# Patient Record
Sex: MALE | Race: WHITE | NOT HISPANIC OR LATINO | Employment: OTHER | URBAN - METROPOLITAN AREA
[De-identification: names, ages, dates, MRNs, and addresses within clinical notes are randomized per-mention and may not be internally consistent; named-entity substitution may affect disease eponyms.]

---

## 2017-01-06 DIAGNOSIS — E11.69 TYPE 2 DIABETES MELLITUS WITH OTHER SPECIFIED COMPLICATION, WITHOUT LONG-TERM CURRENT USE OF INSULIN (H): ICD-10-CM

## 2017-01-06 DIAGNOSIS — E11.69 TYPE 2 DIABETES MELLITUS WITH OTHER SPECIFIED COMPLICATION (H): ICD-10-CM

## 2017-01-06 LAB
ALBUMIN SERPL-MCNC: 4.1 G/DL (ref 3.4–5)
ALP SERPL-CCNC: 68 U/L (ref 40–150)
ALT SERPL W P-5'-P-CCNC: 46 U/L (ref 0–70)
ANION GAP SERPL CALCULATED.3IONS-SCNC: 7 MMOL/L (ref 3–14)
AST SERPL W P-5'-P-CCNC: 25 U/L (ref 0–45)
BILIRUB SERPL-MCNC: 1 MG/DL (ref 0.2–1.3)
BUN SERPL-MCNC: 18 MG/DL (ref 7–30)
CALCIUM SERPL-MCNC: 9.2 MG/DL (ref 8.5–10.1)
CHLORIDE SERPL-SCNC: 106 MMOL/L (ref 94–109)
CHOLEST SERPL-MCNC: 134 MG/DL
CO2 SERPL-SCNC: 28 MMOL/L (ref 20–32)
CREAT SERPL-MCNC: 0.7 MG/DL (ref 0.66–1.25)
CREAT UR-MCNC: 93 MG/DL
GFR SERPL CREATININE-BSD FRML MDRD: ABNORMAL ML/MIN/1.7M2
GLUCOSE SERPL-MCNC: 141 MG/DL (ref 70–99)
HBA1C MFR BLD: 7.3 % (ref 4.3–6)
HDLC SERPL-MCNC: 38 MG/DL
LDLC SERPL CALC-MCNC: 63 MG/DL
MICROALBUMIN UR-MCNC: 7 MG/L
MICROALBUMIN/CREAT UR: 7.58 MG/G CR (ref 0–17)
NONHDLC SERPL-MCNC: 96 MG/DL
POTASSIUM SERPL-SCNC: 4.9 MMOL/L (ref 3.4–5.3)
PROT SERPL-MCNC: 7.5 G/DL (ref 6.8–8.8)
SODIUM SERPL-SCNC: 141 MMOL/L (ref 133–144)
TRIGL SERPL-MCNC: 165 MG/DL

## 2017-01-06 PROCEDURE — 80061 LIPID PANEL: CPT | Performed by: FAMILY MEDICINE

## 2017-01-06 PROCEDURE — 83036 HEMOGLOBIN GLYCOSYLATED A1C: CPT | Performed by: FAMILY MEDICINE

## 2017-01-06 PROCEDURE — 99000 SPECIMEN HANDLING OFFICE-LAB: CPT | Performed by: FAMILY MEDICINE

## 2017-01-06 PROCEDURE — 36415 COLL VENOUS BLD VENIPUNCTURE: CPT | Performed by: FAMILY MEDICINE

## 2017-01-06 PROCEDURE — 82043 UR ALBUMIN QUANTITATIVE: CPT | Performed by: FAMILY MEDICINE

## 2017-01-06 PROCEDURE — 87522 HEPATITIS C REVRS TRNSCRPJ: CPT | Mod: 90 | Performed by: FAMILY MEDICINE

## 2017-01-06 PROCEDURE — 80053 COMPREHEN METABOLIC PANEL: CPT | Performed by: FAMILY MEDICINE

## 2017-01-09 LAB
HCV RNA SERPL NAA+PROBE-ACNC: NORMAL [IU]/ML
HCV RNA SERPL NAA+PROBE-LOG IU: NORMAL LOG IU/ML

## 2017-01-10 DIAGNOSIS — I10 HYPERTENSION, BENIGN ESSENTIAL, GOAL BELOW 140/90: Primary | ICD-10-CM

## 2017-01-10 NOTE — TELEPHONE ENCOUNTER
Lisinopril 10MG Tabs      Last Written Prescription Date: 10/13/2016  Last Fill Quantity: 90, # refills: 0  Last Office Visit with [facility], [facility] or [facility] prescribing provider: 12/27/2016       POTASSIUM   Date Value Ref Range Status   01/06/2017 4.9 3.4 - 5.3 mmol/L Final     CREATININE   Date Value Ref Range Status   01/06/2017 0.70 0.66 - 1.25 mg/dL Final     BP Readings from Last 3 Encounters:   12/27/16 109/70   09/19/16 90/60   11/18/15 130/84

## 2017-01-11 RX ORDER — LISINOPRIL 10 MG/1
10 TABLET ORAL DAILY
Qty: 30 TABLET | Refills: 0 | Status: SHIPPED | OUTPATIENT
Start: 2017-01-11 | End: 2017-01-11

## 2017-01-11 RX ORDER — LISINOPRIL 10 MG/1
10 TABLET ORAL DAILY
Qty: 90 TABLET | Refills: 0 | Status: SHIPPED | OUTPATIENT
Start: 2017-01-11 | End: 2017-04-11

## 2017-01-11 NOTE — TELEPHONE ENCOUNTER
Routing to provider Jared Lowe - please review and advise as appropriate  1. Patient was called and office visit appointment scheduled 1/20/2016  2. Per RN protocol can only provide 30 day refill for patient - however patient orders through mail order - financially beneficial for patient to receive 90 day supply  3. Are you will to provide 90 day supply at this time?    Thank you,  Que Silverman RN

## 2017-01-11 NOTE — PROGRESS NOTES
Quick Note:    Elvin Geller, your recent results are back and are at goal...good work! Please schedule an annual physical; see you then.   Nabeel  ______

## 2017-01-11 NOTE — TELEPHONE ENCOUNTER
Routing refill request to provider for review/approval because:  Patient needs to be seen because:  Due to annual HTN visit  Provider to approve 90 day supply - per patient has mail order pharmacy    Scheduled patient 1/20/2016 - HTN F/U    Thank you  Que Silverman RN

## 2017-02-21 ENCOUNTER — OFFICE VISIT (OUTPATIENT)
Dept: PSYCHOLOGY | Facility: CLINIC | Age: 62
End: 2017-02-21
Payer: COMMERCIAL

## 2017-02-21 DIAGNOSIS — F43.23 ADJUSTMENT DISORDER WITH MIXED ANXIETY AND DEPRESSED MOOD: Primary | ICD-10-CM

## 2017-02-21 PROCEDURE — 90847 FAMILY PSYTX W/PT 50 MIN: CPT | Performed by: MARRIAGE & FAMILY THERAPIST

## 2017-02-21 NOTE — MR AVS SNAPSHOT
MRN:[id number]                      After Visit Summary   2/21/2017    Yimi Mcqueen    MRN: [id number]           Visit Information        Provider Department      2/21/2017 9:30 AM Rhoda Cao [facility] Generic      MyChart Information     MyChart gives you secure access to your electronic health record. If you see a primary care provider, you can also send messages to your care team and make appointments. If you have questions, please call your primary care clinic.  If you do not have a primary care provider, please call [phone] and they will assist you.        Care EveryWhere ID     This is your Care EveryWhere ID. This could be used by other organizations to access your [facility] medical records  [id number]

## 2017-02-21 NOTE — PROGRESS NOTES
Progress Note    Client Name: Yimi Mcqueen  Date: 2/21/2017         Service Type: Family with client present      Session Start Time: 12pm  Session End Time: 12:45pm      Session Length: 45     Session #: 16     Attendees: Client and Spouse / Significant Other    Treatment Plan Last Reviewed: 2/21/2017  PHQ-9 / HENRI-7 : Each session     DATA      Progress Since Last Session (Related to Symptoms / Goals / Homework):   Symptoms: Stable    Homework: Partially completed      Episode of Care Goals: Minimal progress - ACTION (Actively working towards change); Intervened by reinforcing change plan / affirming steps taken     Current / Ongoing Stressors and Concerns:  Agustin was in the hospital for 1 week and still gaining back her strength. She is on meds that make her tired. Nabil has been in and out of hospital. Yimi has been very discouraged by the state of the [employer] with politics. Agustin says she isn't paying as much attention but both are troubled. Talked balance of session about Laisha who has now lost her job (and has since gotten another one) and stole from Agustin. They have given her June 1 as move out date. Couple cannot get on same page re: Laisha. Yimi gets angry and Agustin defends her. Talked about Mommy syndrome. Urged Agustin to work on validation so Yimi doesn't have to increase his anger and stress to convince her there is a serious problem. Yimi to also try validating Agustin more due to her genetic Mommy mode.      Treatment Objective(s) Addressed in This Session:   Identify ways to help his wife manage her stress.  Boundaries with family members  ID ways for Yimi to work on his stress     Intervention:   Solution Focused: encouraged couple to have expectations about the things that matter most: Moving out June 1, holding job, payment contract, family dinner once a week and keeping her room clean.    Relationship coaching around anger management  and communication.        ASSESSMENT: Current Emotional / Mental Status (status of significant symptoms):   Risk status (Self / Other harm or suicidal ideation)   Client denies current fears or concerns for personal safety.   Client denies current or recent suicidal ideation or behaviors.   Client denies current or recent homicidal ideation or behaviors.   Client denies current or recent self injurious behavior or ideation.   Client denies other safety concerns.   A safety and risk management plan has not been developed at this time, however client was given the after-hours number / 911 should there be a change in any of these risk factors.     Appearance:   Appropriate    Eye Contact:   Good    Psychomotor Behavior: Normal    Attitude:   Cooperative    Orientation:   All   Speech    Rate / Production: Normal     Volume:  Normal    Mood:    Normal   Affect:    Appropriate    Thought Content:  Clear    Thought Form:  Coherent  Logical    Insight:    Good      Medication Review:   No current psychiatric medications prescribed     Medication Compliance:   NA     Changes in Health Issues:   None reported     Chemical Use Review:   Substance Use: Chemical use reviewed, no active concerns identified      Tobacco Use: No current tobacco use.       Collateral Reports Completed:   Not Applicable    PLAN: (Client Tasks / Therapist Tasks / Other)  Manage tension with Laisha using clear expectations that she will continue working, move out June 1. Agustin to validate Yimi more often. Reduce defensiveness on both parts.    Rhoda Cao                                                         ________________________________________________________________________                                                   Treatment Plan    Client's Name: Yimi Mcqueen  YOB: 1955    Date: 2/21/2017    DSM-V Diagnoses: 309.28 - Adjustment Disorder with Mixed Anxiety and Depressed Mood By History; V71.09 - No  Diagnosis  Psychosocial / Contextual Factors: Parenting and wife with MS  WHODAS: 12    Referral / Collaboration:  Referral to another professional/service is not indicated at this time..    Anticipated number of session or this episode of care: 10      MeasurableTreatment Goal(s) related to diagnosis / functional impairment(s)  Goal 1: Client will lower HENRI 7 and PHQ 9 scores to 3 or below.    I will know I've met my goal when I'm less anxious and depressed due to our circumstances.      Objective #A (Client Action)    Client will learn healthy boundaries with adult children..  Status: Continued - Date(s):2/21/2017     Intervention(s)  Therapist will teach about healthy boundaries. with adult children.    Objective #B  Client will work to be on the same page with wife..  Status: Continued - Date(s):2/21/2017    Intervention(s)  Therapist will teach strategies to align goals..    Objective #C  Client will give daughter Laisha expectations with bottom lines..  Status: Restarted - Date: 2/21/2017     Intervention(s)  Therapist will help determine bottom line..        Client has reviewed and agreed to the above plan.      Rhoda Cao  February 21, 2017

## 2017-02-22 ASSESSMENT — PATIENT HEALTH QUESTIONNAIRE - PHQ9: SUM OF ALL RESPONSES TO PHQ QUESTIONS 1-9: 1

## 2017-04-11 DIAGNOSIS — I10 HYPERTENSION, BENIGN ESSENTIAL, GOAL BELOW 140/90: ICD-10-CM

## 2017-04-11 RX ORDER — LISINOPRIL 10 MG/1
TABLET ORAL
Qty: 90 TABLET | Refills: 2 | Status: SHIPPED | OUTPATIENT
Start: 2017-04-11 | End: 2018-01-02

## 2017-04-11 NOTE — TELEPHONE ENCOUNTER
Prescription approved per [facility] [place] Refill Protocol.    Pamella Cavazos RN

## 2017-04-11 NOTE — TELEPHONE ENCOUNTER
LISINOPRIL 10MG TABS        Last Written Prescription Date: 1/11/17  Last Fill Quantity: 90, # refills: 0  Last Office Visit with [facility] or [facility] prescribing provider: 12/27/16  Next 5 appointments (look out 90 days)     Apr 12, 2017  1:00 PM CDT   Return Visit with Rhoda Neumann [facility] ([facility])    [address] [facility] [address]  [facility] [address]   [phone]                   Potassium   Date Value Ref Range Status   01/06/2017 4.9 3.4 - 5.3 mmol/L Final     Creatinine   Date Value Ref Range Status   01/06/2017 0.70 0.66 - 1.25 mg/dL Final     BP Readings from Last 3 Encounters:   12/27/16 109/70   09/19/16 90/60   11/18/15 130/84

## 2017-04-12 ENCOUNTER — OFFICE VISIT (OUTPATIENT)
Dept: PSYCHOLOGY | Facility: CLINIC | Age: 62
End: 2017-04-12
Payer: COMMERCIAL

## 2017-04-12 DIAGNOSIS — F41.1 GAD (GENERALIZED ANXIETY DISORDER): Primary | ICD-10-CM

## 2017-04-12 PROCEDURE — 90847 FAMILY PSYTX W/PT 50 MIN: CPT | Performed by: MARRIAGE & FAMILY THERAPIST

## 2017-04-12 NOTE — PROGRESS NOTES
Progress Note    Client Name: Yimi Mcqueen  Date: 4/12/2017         Service Type: Family with client present      Session Start Time: 12pm  Session End Time: 12:45pm      Session Length: 45     Session #: 17     Attendees: Client and Spouse / Significant Other    Treatment Plan Last Reviewed: 2/21/2017  PHQ-9 / HENRI-7 : Each session     DATA      Progress Since Last Session (Related to Symptoms / Goals / Homework):   Symptoms: Stable    Homework: Partially completed      Episode of Care Goals: Minimal progress - ACTION (Actively working towards change); Intervened by reinforcing change plan / affirming steps taken     Current / Ongoing Stressors and Concerns:  Agustin ended up in a coma in a [place] hospital and is still recovering from her seizures and health complications. She had trauma from these events as well as the dreams she had while in a coma. Since coming home, they see that Laisha has violated their contract for living with them. They have given her 7 days to move out. Both are on the same page but Yimi thinks Agustin is going to be manipulated by Laisha and will back down. He blames her for the situation Laisha is in. Processed this and encouraged him to not be so black and white in his opinion. He back downed some. He struggles with significant unresolved anger and hurt from past and current treatment by Laisha.      Treatment Objective(s) Addressed in This Session:   Identify ways to help his wife manage her stress.  Boundaries with family members  ID ways for Yimi to work on his stress     Intervention:   Solution Focused: encouraged couple to have expectations about the things that matter most:    Relationship coaching around anger management and communication.        ASSESSMENT: Current Emotional / Mental Status (status of significant symptoms):   Risk status (Self / Other harm or suicidal ideation)   Client denies current fears or concerns for  personal safety.   Client denies current or recent suicidal ideation or behaviors.   Client denies current or recent homicidal ideation or behaviors.   Client denies current or recent self injurious behavior or ideation.   Client denies other safety concerns.   A safety and risk management plan has not been developed at this time, however client was given the after-hours number / 911 should there be a change in any of these risk factors.     Appearance:   Appropriate    Eye Contact:   Good    Psychomotor Behavior: Normal    Attitude:   Cooperative    Orientation:   All   Speech    Rate / Production: Normal     Volume:  Normal    Mood:    Angry  Elevated  Irritable    Affect:    Appropriate    Thought Content:  Clear    Thought Form:  Coherent  Logical    Insight:    Good      Medication Review:   No current psychiatric medications prescribed     Medication Compliance:   NA     Changes in Health Issues:   None reported     Chemical Use Review:   Substance Use: Chemical use reviewed, no active concerns identified      Tobacco Use: No current tobacco use.       Collateral Reports Completed:   Not Applicable    PLAN: (Client Tasks / Therapist Tasks / Other)  Stay on the same page that Laisha must move out next week. Resist caving when she tries to manipulate. Yimi to have compassion for Agustin who has been and still is in the middle of Yimi and Laisha.     Rhoda Cao                                                         ________________________________________________________________________                                                   Treatment Plan    Client's Name: Yimi Mcqueen  YOB: 1955    Date: 2/21/2017    DSM-V Diagnoses: 309.28 - Adjustment Disorder with Mixed Anxiety and Depressed Mood By History; V71.09 - No Diagnosis  Psychosocial / Contextual Factors: Parenting and wife with MS  WHODAS: 12    Referral / Collaboration:  Referral to another professional/service is  not indicated at this time..    Anticipated number of session or this episode of care: 10      MeasurableTreatment Goal(s) related to diagnosis / functional impairment(s)  Goal 1: Client will lower HENRI 7 and PHQ 9 scores to 3 or below.    I will know I've met my goal when I'm less anxious and depressed due to our circumstances.      Objective #A (Client Action)    Client will learn healthy boundaries with adult children..  Status: Continued - Date(s):2/21/2017     Intervention(s)  Therapist will teach about healthy boundaries. with adult children.    Objective #B  Client will work to be on the same page with wife..  Status: Continued - Date(s):2/21/2017    Intervention(s)  Therapist will teach strategies to align goals..    Objective #C  Client will give daughter Laisha expectations with bottom lines..  Status: Restarted - Date: 2/21/2017     Intervention(s)  Therapist will help determine bottom line..        Client has reviewed and agreed to the above plan.      Rhoda Cao  February 21, 2017

## 2017-04-12 NOTE — MR AVS SNAPSHOT
MRN:[id number]                      After Visit Summary   4/12/2017    Yimi Mcqueen    MRN: [id number]           Visit Information        Provider Department      4/12/2017 1:00 PM Rhoda Cao [facility] Generic      Your next 10 appointments already scheduled     Apr 27, 2017 12:30 PM CDT   Return Visit with Rhoda Thien Mindihowie   [facility] ([facility])    [address]  [facility] [address]   [phone]              MyChart Information     [employer] gives you secure access to your electronic health record. If you see a primary care provider, you can also send messages to your care team and make appointments. If you have questions, please call your primary care clinic.  If you do not have a primary care provider, please call [phone] and they will assist you.        Care EveryWhere ID     This is your Care EveryWhere ID. This could be used by other organizations to access your [facility] medical records  [id number]

## 2017-04-13 ASSESSMENT — PATIENT HEALTH QUESTIONNAIRE - PHQ9: SUM OF ALL RESPONSES TO PHQ QUESTIONS 1-9: 3

## 2017-04-24 ENCOUNTER — ALLIED HEALTH/NURSE VISIT (OUTPATIENT)
Dept: PHARMACY | Facility: CLINIC | Age: 62
End: 2017-04-24
Payer: COMMERCIAL

## 2017-04-24 DIAGNOSIS — E78.5 HYPERLIPIDEMIA WITH TARGET LDL LESS THAN 100: ICD-10-CM

## 2017-04-24 DIAGNOSIS — E11.69 TYPE 2 DIABETES MELLITUS WITH OTHER SPECIFIED COMPLICATION (H): Primary | ICD-10-CM

## 2017-04-24 DIAGNOSIS — I10 HYPERTENSION, BENIGN ESSENTIAL, GOAL BELOW 140/90: ICD-10-CM

## 2017-04-24 DIAGNOSIS — I24.9 ACS (ACUTE CORONARY SYNDROME) (H): ICD-10-CM

## 2017-04-24 PROCEDURE — 99607 MTMS BY PHARM ADDL 15 MIN: CPT | Mod: U4 | Performed by: PHARMACIST

## 2017-04-24 PROCEDURE — 99605 MTMS BY PHARM NP 15 MIN: CPT | Mod: U4 | Performed by: PHARMACIST

## 2017-04-24 RX ORDER — NITROGLYCERIN 0.4 MG/1
0.4 TABLET SUBLINGUAL EVERY 5 MIN PRN
COMMUNITY
End: 2018-06-27

## 2017-04-24 NOTE — MR AVS SNAPSHOT
After Visit Summary   4/24/2017    Yimi Mcqueen    MRN: [id number]           Patient Information     Date Of Birth          1955        Visit Information        Provider Department      4/24/2017 12:30 PM [facility]        Today's Diagnoses     Type 2 diabetes mellitus with other specified complication (H)    -  1    Hyperlipidemia with target LDL less than 100        Hypertension, benign essential, goal below 140/90        ACS (acute coronary syndrome) (H)          Care Instructions    Dear Yimi,     It was a pleasure talking with you today regarding your medications and medical concerns. The following is a summary of what we discussed.     1. Please stay on all your current medications as is .  2. Keep working on diet and exercise changes , lose a few pounds if you can , have A1c lab recheck this summer.   3. Please call [facility] -let them know that your enrolled in their MTM (medication therapy management) program and have them explain what rx copay savings your receiving for being in this program .     Follow-up per phone on Monday October 23rd at 1pm.     Thank you for your time and please feel free to call ([phone] voicemail/pager or [phone] clinic main line) or e-mail ([email or web address]) with any questions.     Anand Jenkins Rph.  Medication Therapy Management Provider  [phone]            Follow-ups after your visit        Your next 10 appointments already scheduled     Apr 27, 2017 12:30 PM CDT   Return Visit with Rhoda Bush ([facility])    [address]  [address]  [facility] [address]   [phone]            Oct 23, 2017  1:00 PM CDT   TELEMEDICINE with Anand Jenkins RPH   [facility] ([facility])    [address]  [facility] [address] [phone]           Note: this is not an onsite visit; there is no need to  come to the facility.              Who to contact     If you have questions or need follow up information about today's clinic visit or your schedule please contact [facility] directly at [phone].  Normal or non-critical lab and imaging results will be communicated to you by MyChart, letter or phone within 4 business days after the clinic has received the results. If you do not hear from us within 7 days, please contact the clinic through MyChart or phone. If you have a critical or abnormal lab result, we will notify you by phone as soon as possible.  Submit refill requests through [employer] or call your pharmacy and they will forward the refill request to us. Please allow 3 business days for your refill to be completed.          Additional Information About Your Visit        Rewarding Returnhart Information     [employer] gives you secure access to your electronic health record. If you see a primary care provider, you can also send messages to your care team and make appointments. If you have questions, please call your primary care clinic.  If you do not have a primary care provider, please call [phone] and they will assist you.        Care EveryWhere ID     This is your Care EveryWhere ID. This could be used by other organizations to access your [facility] medical records  [id number]         Blood Pressure from Last 3 Encounters:   12/27/16 109/70   09/19/16 90/60   11/18/15 130/84    Weight from Last 3 Encounters:   12/27/16 257 lb (116.6 kg)   09/19/16 253 lb 1.6 oz (114.8 kg)   09/09/16 252 lb 9.6 oz (114.6 kg)              Today, you had the following     No orders found for display       Primary Care Provider Office Phone # Fax #    Nabeel Lowe [phone]       [facility] [address] [facility] [address]  [facility] [address]        Thank you!     Thank you for choosing [facility]  for your care. Our goal is always to provide you with  excellent care. Hearing back from our patients is one way we can continue to improve our services. Please take a few minutes to complete the written survey that you may receive in the mail after your visit with us. Thank you!             Your Updated Medication List - Protect others around you: Learn how to safely use, store and throw away your medicines at www.disposemymeds.org.          This list is accurate as of: 4/24/17  4:56 PM.  Always use your most recent med list.                   Brand Name Dispense Instructions for use    aspirin 81 MG tablet      Take  by mouth daily.       atorvastatin 80 MG tablet    LIPITOR     Take 80 mg by mouth daily       blood glucose monitoring test strip    ACCU-CHEK TIERNEY    100 strip    Use to test blood sugars 2 times daily or as directed.       BRILINTA 90 MG tablet   Generic drug:  ticagrelor      Take 90 mg by mouth 2 times daily       calcium-vitamin D 600-400 MG-UNIT per tablet    CALTRATE     Take 1 tablet by mouth daily       lisinopril 10 MG tablet    PRINIVIL/ZESTRIL    90 tablet    TAKE ONE TABLET BY MOUTH EVERY DAY       metFORMIN 500 MG 24 hr tablet    GLUCOPHAGE-XR     Take 500 mg by mouth 3 times daily (with meals)       metoprolol 50 MG 24 hr tablet    TOPROL-XL     Take 50 mg by mouth daily       MULTIVITAMINS PO      Take  by mouth daily.       NITROSTAT 0.4 MG sublingual tablet   Generic drug:  nitroglycerin      Place 0.4 mg under the tongue every 5 minutes as needed for chest pain For chest pain place 1 tablet under the tongue every 5 minutes for 3 doses. If symptoms persist 5 minutes after 1st dose call 911.       sildenafil 50 MG cap/tab    VIAGRA    6 tablet    Take 1 tablet (50 mg) by mouth daily as needed for erectile dysfunction

## 2017-04-24 NOTE — PATIENT INSTRUCTIONS
Dear Yimi,     It was a pleasure talking with you today regarding your medications and medical concerns. The following is a summary of what we discussed.     1. Please stay on all your current medications as is .  2. Keep working on diet and exercise changes , lose a few pounds if you can , have A1c lab recheck this summer.   3. Please call [facility] -let them know that your enrolled in their MTM (medication therapy management) program and have them explain what rx copay savings your receiving for being in this program .     Follow-up per phone on Monday October 23rd at 1pm.     Thank you for your time and please feel free to call ([phone] voicemail/pager or [phone] clinic main line) or e-mail ([email or web address]) with any questions.     Anand Jenkins Rph.  Medication Therapy Management Provider  [phone]

## 2017-04-24 NOTE — PROGRESS NOTES
SUBJECTIVE/OBJECTIVE:                                                    Yimi Mcqueen is a 61 year old male called for an initial visit for Medication Therapy Management.  He was referred to me from [facility]-MT[facility] program. Heart, diabetes and asthma meds have reduced copays on this MTM-[facility] program per patient.     Chief Complaint: He wants general med review.       Personal Healthcare Goals: lose another 10 lbs -20lbs.  Plan - cards wants him on meditarranean diet   He's a gourmet cook, organic garden , frustrated with nutrtion classes --waste of time --what other options do we have for him ?  He's retired now as well.     Allergies/ADRs: Reviewed in Epic  Tobacco: History of tobacco dependence - quit 16 years ago.  Alcohol: Less than 1 beverage / month  Caffeine: 1 cups/day of coffee  Activity: bike -100 miles /week x 3-4 years.   PMH: Reviewed in Epic; DM x 2 years now;  Recent MI sept. 2016, 2 stents 10-10-16    Medication Adherence: issues found and discussed below and sets up own med boxes    Type -2 DM: metformin- er 500mg tid now  - checking bs's -- prn - 110-135 fasting in am .  115-120 after a bike ride.   Last a1c is 7.3% --he 'd like to lose weight and work on a different plan yet he eats non processed foods and shops outer aisles at [employer] and grows organic veggies at home.  He has changed diet now last 6 months more healthy -now weighs at home 249lbs.     His cards md wants him to try meditearrean diet --he is on a veggies/ fruits diet , avoiding more red meat -just monthly now. Chicken qod, fish weekly .     He does see endo md Dr. Blank -patient states he was told a1c ok does not need any glp-1 med.       ACS:  Recent MI (2016)-- 2 coronary artery drug eleuting stents -80mg atorva + brilinta 90mg bid , also metoprolol er 50mg qday and sl-prn nitro --carries with him on his key chain.        Hyperlipidemia: Current therapy includes Atorvastatin 80mg. once daily and exercise.  Pt reports no  significant myalgias or other side effects.         Hypertension: Current medications include Lisinopril 10mg qam and metoprolol 50mg er tab hs .  Patient does self-monitor BP. Home BP monitoring in range of 120's systolic over 70's diastolic.pulse range 65-68.  Patient reports no current medication side effects.          Current labs include:  BP Readings from Last 3 Encounters:   12/27/16 109/70   09/19/16 90/60   11/18/15 130/84     Lab Results   Component Value Date    A1C 7.3 01/06/2017    A1C 7.1 09/19/2016    A1C 7.3 09/25/2015    A1C 6.5 09/26/2014    A1C 8.4 04/10/2014       Cholesterol   Date Value Ref Range Status   01/06/2017 134 <200 mg/dL Final   09/25/2015 237 (H) <200 mg/dL Final     Comment:     LDL Cholesterol is the primary guide to therapy.   The NCEP recommends further evaluation of: patients with cholesterol greater   than 200 mg/dL if additional risk factors are present, cholesterol greater   than   240 mg/dL, triglycerides greater than 150 mg/dL, or HDL less than 40 mg/dL.       HDL Cholesterol   Date Value Ref Range Status   01/06/2017 38 (L) >39 mg/dL Final   09/25/2015 39 (L) >40 mg/dL Final     LDL Cholesterol Calculated   Date Value Ref Range Status   01/06/2017 63 <100 mg/dL Final     Comment:     Desirable:       <100 mg/dl   09/25/2015 145 (H) 0 - 129 mg/dL Final     Comment:     LDL Cholesterol is the primary guide to therapy: LDL-cholesterol goal in high   risk patients is <100 mg/dL and in very high risk patients is <70 mg/dL.       Triglycerides   Date Value Ref Range Status   01/06/2017 165 (H) <150 mg/dL Final     Comment:     Borderline high:  150-199 mg/dl   High:             200-499 mg/dl   Very high:       >499 mg/dl   Fasting specimen     09/25/2015 265 (H) 0 - 150 mg/dL Final     Comment:     Fasting specimen     Cholesterol/HDL Ratio   Date Value Ref Range Status   09/25/2015 6.1 (H) 0.0 - 5.0 Final   09/26/2014 5.9 (H) 0.0 - 5.0 Final         Liver Function Studies -    Recent Labs   Lab Test  09/25/15   1118   PROTTOTAL  7.5   ALBUMIN  4.4   BILITOTAL  0.8   ALKPHOS  58   AST  25   ALT  40       MICROL       69   9/25/2015  MICROALBUMIN    38.93   9/25/2015    Last Basic Metabolic Panel:  Lab Results   Component Value Date     01/06/2017      Lab Results   Component Value Date    POTASSIUM 4.9 01/06/2017     Lab Results   Component Value Date    CHLORIDE 106 01/06/2017     Lab Results   Component Value Date    ALBARO 9.2 01/06/2017     Lab Results   Component Value Date    CO2 28 01/06/2017     Lab Results   Component Value Date    BUN 18 01/06/2017     Lab Results   Component Value Date    CR 0.70 01/06/2017     Lab Results   Component Value Date     01/06/2017       GFR Estimate   Date Value Ref Range Status   01/06/2017 >90  Non  GFR Calc   >60 mL/min/1.7m2 Final   09/25/2015 85 >60 mL/min/1.7m2 Final     Comment:     Non  GFR Calc   06/24/2015 88 >60 mL/min/1.7m2 Final     Comment:     Non  GFR Calc     GFR Estimate If Black   Date Value Ref Range Status   01/06/2017 >90   GFR Calc   >60 mL/min/1.7m2 Final   09/25/2015 >90   GFR Calc   >60 mL/min/1.7m2 Final   06/24/2015 >90   GFR Calc   >60 mL/min/1.7m2 Final     TSH   Date Value Ref Range Status   09/19/2016 0.50 0.40 - 4.00 mU/L Final   ]      Most Recent Immunizations   Administered Date(s) Administered     Influenza Vaccine IM 3yrs+ 4 Valent IIV4 09/19/2016     Pneumococcal (PCV 13) 09/25/2015     Pneumococcal 23 valent 09/19/2016     TDAP Vaccine (Adacel) 04/10/2014     ASSESSMENT:                                                       Current medications were reviewed with him today.     Medication Adherence: no  Issues now.       Diabetes: Needs Improvement. Patient is not meeting A1c goal of < 7%. Self monitoring of blood glucose is not at goal of fasting  mg/dL and post prandial < 150 mg/dL. Pt would  benefit from minimum SMBG: Check blood sugars fasting, and occasionally 2 hours after starting a meal.  Metformin :  stay on the same dose.  GLP-1 agonist (victoza/trulicity) :  Consider glp--1 med in future if a1c worsens?  Dr. Lowe previously ok'd mtm to add this drug in if patient wan ts it --he declines today but may in future?  Increased exercise--stay active .   Weight loss recommended--keep improving thru low carb diet , daily exercise and weight loss.       ACS: stable     Hyperlipidemia: stable. Pt is on high intensity statin which is indicated based on 2013 ACC/AHA guidelines for lipid management.      Hypertension: Stable. Patient is meeting BP goal of < 140/90mmHg.        PLAN:                                                      1. Please stay on all your current medications as is .  2. Keep working on diet and exercise changes , lose a few pounds if you can , have A1c lab recheck this summer.   3. Please call [facility] -let them know that your enrolled in their MTM (medication therapy management) program and have them explain what rx copay savings your receiving for being in this program .     Follow-up per phone on Monday October 23rd at 1pm.     I spent 40 minutes with this patient today.  All changes were made via collaborative practice agreement with Nabeel Lowe. A copy of the visit note was provided to the patient's primary care provider.        The patient was sent via [employer] a summary of these recommendations as an after visit summary.     Anand Jenkins Rph.  Medication Therapy Management Provider  [phone]

## 2017-04-24 NOTE — Clinical Note
Dr. Lowe--jovan-- I had a nice mtm phone visit with rochelle today --he declined to add a glp-1 drug to improved his a1c and reduce his weight -he wants 6 months to continue new diet then recheck a1c and go from their .  Thank you , Anand Jenkins, [facility]. Medication Therapy Management Provider [phone]

## 2017-04-27 ENCOUNTER — OFFICE VISIT (OUTPATIENT)
Dept: PSYCHOLOGY | Facility: CLINIC | Age: 62
End: 2017-04-27
Payer: COMMERCIAL

## 2017-04-27 DIAGNOSIS — F43.23 ADJUSTMENT DISORDER WITH MIXED ANXIETY AND DEPRESSED MOOD: Primary | ICD-10-CM

## 2017-04-27 PROCEDURE — 90847 FAMILY PSYTX W/PT 50 MIN: CPT | Performed by: MARRIAGE & FAMILY THERAPIST

## 2017-04-27 NOTE — MR AVS SNAPSHOT
MRN:[id number]                      After Visit Summary   4/27/2017    Yimi Mcqueen    MRN: [id number]           Visit Information        Provider Department      4/27/2017 12:30 PM Rhoda Cao [facility] Generic      Your next 10 appointments already scheduled     May 11, 2017  9:30 AM CDT   Return Visit with Rhoda Thien [place]   [facility] ([facility])    [address]  [facility] [address]   [phone]            Oct 23, 2017  1:00 PM CDT   TELEMEDICINE with Anand Jenkins B[place]   [facility] ([facility])    [address] [facility] [address] [phone]           Note: this is not an onsite visit; there is no need to come to the facility.              [employer] Information     [facility] gives you secure access to your electronic health record. If you see a primary care provider, you can also send messages to your care team and make appointments. If you have questions, please call your primary care clinic.  If you do not have a primary care provider, please call [phone] and they will assist you.        Care EveryWhere ID     This is your Care EveryWhere ID. This could be used by other organizations to access your [place] medical records  [id number]

## 2017-04-27 NOTE — PROGRESS NOTES
Progress Note    Client Name: Yimi Mcqueen  Date: 4/27/2017         Service Type: Family with client present      Session Start Time: 12pm  Session End Time: 12:45pm      Session Length: 45     Session #: 18     Attendees: Client and Spouse / Significant Other    Treatment Plan Last Reviewed: 2/21/2017  PHQ-9 / HENRI-7 : Each session     DATA      Progress Since Last Session (Related to Symptoms / Goals / Homework):   Symptoms: Stable    Homework: Partially completed      Episode of Care Goals: Satisfactory progress - ACTION (Actively working towards change); Intervened by reinforcing change plan / affirming steps taken     Current / Ongoing Stressors and Concerns:  Couple were able to move their daughter Laisha out of the house. Agustin is missing her somewhat but enjoying the peace and quiet. Yimi is very happy.      Treatment Objective(s) Addressed in This Session:   Identify ways to help his wife manage her stress.  Boundaries with family members  ID ways for Yimi to work on his stress  Cautioned couple to not triangulate with the girls.     Intervention:   Solution Focused: encouraged couple to have expectations about the things that matter most:    Relationship coaching around anger management and communication.        ASSESSMENT: Current Emotional / Mental Status (status of significant symptoms):   Risk status (Self / Other harm or suicidal ideation)   Client denies current fears or concerns for personal safety.   Client denies current or recent suicidal ideation or behaviors.   Client denies current or recent homicidal ideation or behaviors.   Client denies current or recent self injurious behavior or ideation.   Client denies other safety concerns.   A safety and risk management plan has not been developed at this time, however client was given the after-hours number / 911 should there be a change in any of these risk  factors.     Appearance:   Appropriate    Eye Contact:   Good    Psychomotor Behavior: Normal    Attitude:   Cooperative    Orientation:   All   Speech    Rate / Production: Normal     Volume:  Normal    Mood:    Angry  Elevated  Irritable    Affect:    Appropriate    Thought Content:  Clear    Thought Form:  Coherent  Logical    Insight:    Good      Medication Review:   No current psychiatric medications prescribed     Medication Compliance:   NA     Changes in Health Issues:   None reported     Chemical Use Review:   Substance Use: Chemical use reviewed, no active concerns identified      Tobacco Use: No current tobacco use.       Collateral Reports Completed:   Not Applicable    PLAN: (Client Tasks / Therapist Tasks / Other)  Stay on course with healthy boundaries with both girls. Do not triangulate now with Nabil in need. Focus on health and wellness.        Rhoda Neumann [facility]                                                         ________________________________________________________________________                                                   Treatment Plan    Client's Name: Yimi Mcqueen  YOB: 1955    Date: 2/21/2017    DSM-V Diagnoses: 309.28 - Adjustment Disorder with Mixed Anxiety and Depressed Mood By History; V71.09 - No Diagnosis  Psychosocial / Contextual Factors: Parenting and wife with MS  WHODAS: 12    Referral / Collaboration:  Referral to another professional/service is not indicated at this time..    Anticipated number of session or this episode of care: 10      MeasurableTreatment Goal(s) related to diagnosis / functional impairment(s)  Goal 1: Client will lower HENRI 7 and PHQ 9 scores to 3 or below.    I will know I've met my goal when I'm less anxious and depressed due to our circumstances.      Objective #A (Client Action)    Client will learn healthy boundaries with adult children..  Status: Continued - Date(s):2/21/2017     Intervention(s)  Therapist will teach  about healthy boundaries. with adult children.    Objective #B  Client will work to be on the same page with wife..  Status: Continued - Date(s):2/21/2017    Intervention(s)  Therapist will teach strategies to align goals..    Objective #C  Client will give daughter Laisha expectations with bottom lines..  Status: Restarted - Date: 2/21/2017     Intervention(s)  Therapist will help determine bottom line..        Client has reviewed and agreed to the above plan.      Rhoda Cao  February 21, 2017

## 2017-04-28 ASSESSMENT — PATIENT HEALTH QUESTIONNAIRE - PHQ9: SUM OF ALL RESPONSES TO PHQ QUESTIONS 1-9: 0

## 2017-05-11 ENCOUNTER — OFFICE VISIT (OUTPATIENT)
Dept: PSYCHOLOGY | Facility: CLINIC | Age: 62
End: 2017-05-11
Payer: COMMERCIAL

## 2017-05-11 DIAGNOSIS — F43.23 ADJUSTMENT DISORDER WITH MIXED ANXIETY AND DEPRESSED MOOD: Primary | ICD-10-CM

## 2017-05-11 PROCEDURE — 90847 FAMILY PSYTX W/PT 50 MIN: CPT | Performed by: MARRIAGE & FAMILY THERAPIST

## 2017-05-11 NOTE — MR AVS SNAPSHOT
MRN:[id number]                      After Visit Summary   5/11/2017    Yimi Mcqueen    MRN: [id number]           Visit Information        Provider Department      5/11/2017 9:30 AM Rhoda Cao [facility] [place] [facility] Generic      Your next 10 appointments already scheduled     Jun 08, 2017  9:00 AM CDT   Return Visit with Rhoda Cao   [facility] [place] ([facility] [place])    [address]  [facility] [address]   [phone]            Oct 23, 2017  1:00 PM CDT   TELEMEDICINE with Anand Jenkins [facility] ([facility])    [address] [address] [phone]           Note: this is not an onsite visit; there is no need to come to the facility.              MyChart Information     [facility] gives you secure access to your electronic health record. If you see a primary care provider, you can also send messages to your care team and make appointments. If you have questions, please call your primary care clinic.  If you do not have a primary care provider, please call [phone] and they will assist you.        Care EveryWhere ID     This is your Care EveryWhere ID. This could be used by other organizations to access your [place] medical records  [id number]

## 2017-05-11 NOTE — PROGRESS NOTES
Progress Note    Client Name: Yimi Mcqueen  Date: 5/11/2017         Service Type: Family with client present      Session Start Time: 9:30am  Session End Time: 10:15am      Session Length: 45     Session #: 20     Attendees: Client and Spouse / Significant Other    Treatment Plan Last Reviewed: 5/11/2017  PHQ-9 / HENRI-7 : Each session     DATA      Progress Since Last Session (Related to Symptoms / Goals / Homework):   Symptoms: Stable    Homework: Partially completed      Episode of Care Goals: Satisfactory progress - ACTION (Actively working towards change); Intervened by reinforcing change plan / affirming steps taken     Current / Ongoing Stressors and Concerns:  Couple are still adjusting to having Laisha out of the house. Agustin is worrying about her. trying to move forward. Wanting to set boundaries with cell phone. Set expectations.     Treatment Objective(s) Addressed in This Session:   Identify ways to help his wife manage her stress.  Boundaries with family members       Intervention:   Solution Focused: encouraged couple to have expectations about the things that matter most:    Relationship coaching around anger management and communication.        ASSESSMENT: Current Emotional / Mental Status (status of significant symptoms):   Risk status (Self / Other harm or suicidal ideation)   Client denies current fears or concerns for personal safety.   Client denies current or recent suicidal ideation or behaviors.   Client denies current or recent homicidal ideation or behaviors.   Client denies current or recent self injurious behavior or ideation.   Client denies other safety concerns.   A safety and risk management plan has not been developed at this time, however client was given the after-hours number / 911 should there be a change in any of these risk factors.     Appearance:   Appropriate    Eye Contact:   Good    Psychomotor Behavior: Normal     Attitude:   Cooperative    Orientation:   All   Speech    Rate / Production: Normal     Volume:  Normal    Mood:    Normal   Affect:    Appropriate    Thought Content:  Clear    Thought Form:  Coherent  Logical    Insight:    Good      Medication Review:   No current psychiatric medications prescribed     Medication Compliance:   NA     Changes in Health Issues:   None reported     Chemical Use Review:   Substance Use: Chemical use reviewed, no active concerns identified      Tobacco Use: No current tobacco use.       Collateral Reports Completed:   Not Applicable    PLAN: (Client Tasks / Therapist Tasks / Other)  Stay on course with healthy boundaries with both girls. Expectations regarding cell phone. Do not triangulate now with Nabil in need. Focus on health and wellness.        Rhodamarleen Neumann [facility]                                                         ________________________________________________________________________                                                   Treatment Plan    Client's Name: Yimi Mcqueen  YOB: 1955    Date: 5/11/2017    DSM-V Diagnoses: 309.28 - Adjustment Disorder with Mixed Anxiety and Depressed Mood By History; V71.09 - No Diagnosis  Psychosocial / Contextual Factors: Parenting with wife their adult daughter with addiction and felony  WHODAS: 12    Referral / Collaboration:  Referral to another professional/service is not indicated at this time..    Anticipated number of session or this episode of care: 10      MeasurableTreatment Goal(s) related to diagnosis / functional impairment(s)  Goal 1: Client will lower HENRI 7 and PHQ 9 scores to 3 or below.    I will know I've met my goal when I'm less anxious and depressed due to our circumstances.      Objective #A (Client Action)    Client will learn healthy boundaries with adult children..  Status: Continued - Date(s): 5/11/2017     Intervention(s)  Therapist will teach about healthy boundaries. with adult  children.    Objective #B  Client will work to be on the same page with wife..  Status: Continued - Date(s): 5/11/2017    Intervention(s)  Therapist will teach strategies to align goals..    Objective #C  Client will give daughter Laisha expectations with bottom lines..  Status: Completed - Date: 5/11/2017     Intervention(s)  Therapist will help determine bottom line..        Client has reviewed and agreed to the above plan.      Rhoda Cao  May 11, 2017

## 2017-05-12 ASSESSMENT — PATIENT HEALTH QUESTIONNAIRE - PHQ9: SUM OF ALL RESPONSES TO PHQ QUESTIONS 1-9: 1

## 2017-06-15 ENCOUNTER — OFFICE VISIT (OUTPATIENT)
Dept: PSYCHOLOGY | Facility: CLINIC | Age: 62
End: 2017-06-15
Payer: COMMERCIAL

## 2017-06-15 DIAGNOSIS — F43.23 ADJUSTMENT DISORDER WITH MIXED ANXIETY AND DEPRESSED MOOD: Primary | ICD-10-CM

## 2017-06-15 PROCEDURE — 90847 FAMILY PSYTX W/PT 50 MIN: CPT | Performed by: MARRIAGE & FAMILY THERAPIST

## 2017-06-15 NOTE — MR AVS SNAPSHOT
MRN:[id number]                      After Visit Summary   6/15/2017    Yimi Mcqueen    MRN: [id number]           Visit Information        Provider Department      6/15/2017 11:00 AM Nash Rhoda Thien [facility] Generic      Your next 10 appointments already scheduled     Jul 10, 2017  1:00 PM CDT   PHYSICAL with Nabeel Lowe MD   [facility] ([facility]    [address]  [facility] [address]   [phone]            Jul 13, 2017 10:00 AM CDT   Return Visit with Rhoda Cao   [facility] ([facility] [place])    [address]  [facility] [address] [phone]            Oct 23, 2017  1:00 PM CDT   TELEMEDICINE with Anand Jenkins RPH   [facility] ([facility])    [address] [facility] [id number] [phone]           Note: this is not an onsite visit; there is no need to come to the facility.              [employer] Information     [email or web address] gives you secure access to your electronic health record. If you see a primary care provider, you can also send messages to your care team and make appointments. If you have questions, please call your primary care clinic.  If you do not have a primary care provider, please call [phone] and they will assist you.        Care EveryWhere ID     This is your Care EveryWhere ID. This could be used by other organizations to access your [place] medical records  [id number]

## 2017-06-15 NOTE — PROGRESS NOTES
Progress Note    Client Name: Yimi Mcqueen  Date: 6/15/2017         Service Type: Family with client present      Session Start Time: 9:30am  Session End Time: 10:15am      Session Length: 45     Session #: 21     Attendees: Client and Spouse / Significant Other    Treatment Plan Last Reviewed: 5/11/2017  PHQ-9 / HENRI-7 : Each session     DATA      Progress Since Last Session (Related to Symptoms / Goals / Homework):   Symptoms: Stable    Homework: Partially completed      Episode of Care Goals: Satisfactory progress - ACTION (Actively working towards change); Intervened by reinforcing change plan / affirming steps taken     Current / Ongoing Stressors and Concerns:  Couple are struggling with Agustin's sleep issues. Encouraged her to see a sleep doctor. Also talked about setting limits with helping Nabil and getting her connected with the state resources.       Treatment Objective(s) Addressed in This Session:   Identify ways to help his wife manage her stress.  Boundaries with family members       Intervention:   Solution Focused: encouraged couple to have expectations about the things that matter most:    Relationship coaching around anger management and communication.        ASSESSMENT: Current Emotional / Mental Status (status of significant symptoms):   Risk status (Self / Other harm or suicidal ideation)   Client denies current fears or concerns for personal safety.   Client denies current or recent suicidal ideation or behaviors.   Client denies current or recent homicidal ideation or behaviors.   Client denies current or recent self injurious behavior or ideation.   Client denies other safety concerns.   A safety and risk management plan has not been developed at this time, however client was given the after-hours number / 911 should there be a change in any of these risk factors.     Appearance:   Appropriate    Eye Contact:   Good    Psychomotor  Behavior: Normal    Attitude:   Cooperative    Orientation:   All   Speech    Rate / Production: Normal     Volume:  Normal    Mood:    Normal   Affect:    Appropriate    Thought Content:  Clear    Thought Form:  Coherent  Logical    Insight:    Good      Medication Review:   No current psychiatric medications prescribed     Medication Compliance:   NA     Changes in Health Issues:   None reported     Chemical Use Review:   Substance Use: Chemical use reviewed, no active concerns identified      Tobacco Use: No current tobacco use.       Collateral Reports Completed:   Not Applicable    PLAN: (Client Tasks / Therapist Tasks / Other)  Focus on health and wellness.        Rhoda Cao                                                         ________________________________________________________________________                                                   Treatment Plan    Client's Name: Yimi Mcqueen  YOB: 1955    Date: 5/11/2017    DSM-V Diagnoses: 309.28 - Adjustment Disorder with Mixed Anxiety and Depressed Mood By History; V71.09 - No Diagnosis  Psychosocial / Contextual Factors: Parenting with wife their adult daughter with addiction and felony  WHODAS: 12    Referral / Collaboration:  Referral to another professional/service is not indicated at this time..    Anticipated number of session or this episode of care: 10      MeasurableTreatment Goal(s) related to diagnosis / functional impairment(s)  Goal 1: Client will lower HENRI 7 and PHQ 9 scores to 3 or below.    I will know I've met my goal when I'm less anxious and depressed due to our circumstances.      Objective #A (Client Action)    Client will learn healthy boundaries with adult children..  Status: Continued - Date(s): 5/11/2017     Intervention(s)  Therapist will teach about healthy boundaries. with adult children.    Objective #B  Client will work to be on the same page with wife..  Status: Continued - Date(s):  5/11/2017    Intervention(s)  Therapist will teach strategies to align goals..    Objective #C  Client will give daughter Laisha expectations with bottom lines..  Status: Completed - Date: 5/11/2017     Intervention(s)  Therapist will help determine bottom line..        Client has reviewed and agreed to the above plan.      Rhoda Cao  May 11, 2017

## 2017-07-10 ENCOUNTER — OFFICE VISIT (OUTPATIENT)
Dept: FAMILY MEDICINE | Facility: CLINIC | Age: 62
End: 2017-07-10
Payer: COMMERCIAL

## 2017-07-10 VITALS
RESPIRATION RATE: 12 BRPM | TEMPERATURE: 97.4 F | BODY MASS INDEX: 36.09 KG/M2 | DIASTOLIC BLOOD PRESSURE: 69 MMHG | HEIGHT: 71 IN | SYSTOLIC BLOOD PRESSURE: 112 MMHG | WEIGHT: 257.8 LBS | HEART RATE: 67 BPM | OXYGEN SATURATION: 97 %

## 2017-07-10 DIAGNOSIS — E11.9 TYPE 2 DIABETES MELLITUS WITHOUT COMPLICATION, WITHOUT LONG-TERM CURRENT USE OF INSULIN (H): ICD-10-CM

## 2017-07-10 DIAGNOSIS — L91.8 SKIN TAG: ICD-10-CM

## 2017-07-10 DIAGNOSIS — Z00.00 ENCOUNTER FOR ROUTINE ADULT HEALTH EXAMINATION WITHOUT ABNORMAL FINDINGS: Primary | ICD-10-CM

## 2017-07-10 DIAGNOSIS — I25.2 OLD MYOCARDIAL INFARCTION: ICD-10-CM

## 2017-07-10 PROCEDURE — 99396 PREV VISIT EST AGE 40-64: CPT | Mod: 25 | Performed by: FAMILY MEDICINE

## 2017-07-10 PROCEDURE — 17000 DESTRUCT PREMALG LESION: CPT | Performed by: FAMILY MEDICINE

## 2017-07-10 NOTE — NURSING NOTE
"Chief Complaint   Patient presents with     Physical       Initial /69 (BP Location: Left arm)  Pulse 67  Temp 97.4  F (36.3  C) (Oral)  Resp 12  Ht 5' 10.8\" (1.798 m)  Wt 257 lb 12.8 oz (116.9 kg)  SpO2 97%  BMI 36.16 kg/m2 Estimated body mass index is 36.16 kg/(m^2) as calculated from the following:    Height as of this encounter: 5' 10.8\" (1.798 m).    Weight as of this encounter: 257 lb 12.8 oz (116.9 kg).  Medication Reconciliation: complete     Kyung Lama CMA      "

## 2017-07-10 NOTE — PATIENT INSTRUCTIONS
-Please try to increase your fluid intake in the morning so that you may stay well hydrated. One possible plan could be by starting to drink iced coffee in the morning in place of your coffee.  -Try to avoid scratching the area of your skin tag so that you do not have any problems with bleeding.

## 2017-07-10 NOTE — PROGRESS NOTES
"SUBJECTIVE:   CC: Yimi Mcqueen is an 61 year old male who presents for preventative health visit.     Patient says that he has been exercising more regularly and has been taking frequent long bike rides. He is planning on going on a 500 mile bike tour soon and has been training for that. He believes that he is in good shape, but notes that he has gained 5 pounds in the last several months. His goal is to lose 20 pounds within the next several years. He is frustrated that he has gained weight despite exercising more frequently, and he thinks that he has been eating more than normal. Patient has a history of a myocardial infarction in September of 2016 with 2 blocked arteries, and he says that he has recovered well. He has been taking brilinta and seeing cardiology for management. While on the medication he has slight dizziness when he stands up, and he has been trying to stay well hydrated by drinking a lot of fluids. He has had some problems with chest pain and slight shortness of breath when he bikes up very steep hills, but says that he thinks his heart is in improved health. Patient says that he has been gardening lately and has been eating more fruits and vegetables than normal. He enjoys gardening and believes it has helped him to be healthier.    Calf Cramps:  Patient reports that he has been having problems with frequent cramping while he is biking. He says that he has been trying to increase his fluid intake, and says that he does feel better when he is hydrated. He has also tried using pickle juice for cramps, which seems to provide some relief. Patient reports that he is a \"Salty sweater\" and has had problems with losing sodium from exercising, though he worries about increasing his salt intake due to his history of hypertension and heart disease. He has been trying to decrease his meat intake and eating more vegetables.    Skin Tag      Onset: unsure    Description (location/number): 1 " back    Accompanying signs and symptoms: Painful: no     History: prior skin tags: YES- located on neck, removed several years ago    Therapies tried and outcome: None    Patient says that he has a skin tag on his back that he would like checked. He has a history of a similar spot on his neck several years ago.     Physical   Annual:     Getting at least 3 servings of Calcium per day::  Yes    Bi-annual eye exam::  Yes    Dental care twice a year::  Yes    Sleep apnea or symptoms of sleep apnea::  None    Diet::  Low fat/cholesterol, Diabetic and Carbohydrate counting    Frequency of exercise::  2-3 days/week    Taking medications regularly::  Yes    Medication side effects::  Lightheadedness    Today's PHQ-2 Score:   PHQ-2 ( 1999 [employer]) 7/8/2017   Q1: Little interest or pleasure in doing things 0   Q2: Feeling down, depressed or hopeless 0   PHQ-2 Score 0   Q1: Little interest or pleasure in doing things Not at all   Q2: Feeling down, depressed or hopeless Not at all   PHQ-2 Score 0       Abuse: Current or Past(Physical, Sexual or Emotional)- No  Do you feel safe in your environment - Yes    Social History   Substance Use Topics     Smoking status: Former Smoker     Packs/day: 1.50     Years: 20.00     Quit date: 1/1/2006     Smokeless tobacco: Never Used     Alcohol use Yes      Comment: Occ.      The patient does not drink >3 drinks per day nor >7 drinks per week.    Last PSA:   PSA   Date Value Ref Range Status   08/03/2009 0.7 ng/mL        Reviewed orders with patient. Reviewed health maintenance and updated orders accordingly - Yes  BP Readings from Last 3 Encounters:   07/10/17 112/69   12/27/16 109/70   09/19/16 90/60    Wt Readings from Last 3 Encounters:   07/10/17 116.9 kg (257 lb 12.8 oz)   12/27/16 116.6 kg (257 lb)   09/19/16 114.8 kg (253 lb 1.6 oz)        Patient Active Problem List   Diagnosis     Advance Care Planning     Numerous moles     BMI 38.0-38.9,adult     Impacted cerumen      "Hyperlipidemia with target LDL less than 100     Venous (peripheral) insufficiency     ED (erectile dysfunction)     CKD (chronic kidney disease) stage 2, GFR 60-89 ml/min     Hypertension, benign essential, goal below 140/90     Old myocardial infarction     Type 2 diabetes mellitus without complication, without long-term current use of insulin (H)     Past Surgical History:   Procedure Laterality Date     C MUSCLE-SKIN FLAP,LEG  \"       \"     C OPEN RX ANKLE DISLOCATN+FIXATN  ~'05    infected     COLONOSCOPY  05/12    repeat 5 yrs       Social History   Substance Use Topics     Smoking status: Former Smoker     Packs/day: 1.50     Years: 20.00     Quit date: 1/1/2006     Smokeless tobacco: Never Used     Alcohol use Yes      Comment: Occ.      Family History   Problem Relation Age of Onset     Cancer - colorectal Mother      Colon Cancer Mother      HEART DISEASE Father          Current Outpatient Prescriptions   Medication Sig Dispense Refill     nitroglycerin (NITROSTAT) 0.4 MG sublingual tablet Place 0.4 mg under the tongue every 5 minutes as needed for chest pain For chest pain place 1 tablet under the tongue every 5 minutes for 3 doses. If symptoms persist 5 minutes after 1st dose call 911.       lisinopril (PRINIVIL/ZESTRIL) 10 MG tablet TAKE ONE TABLET BY MOUTH EVERY DAY 90 tablet 2     ticagrelor (BRILINTA) 90 MG tablet Take 90 mg by mouth 2 times daily       atorvastatin (LIPITOR) 80 MG tablet Take 80 mg by mouth daily       metFORMIN (GLUCOPHAGE-XR) 500 MG 24 hr tablet Take 500 mg by mouth 3 times daily (with meals)       metoprolol (TOPROL-XL) 50 MG 24 hr tablet Take 50 mg by mouth daily       calcium-vitamin D (CALTRATE) 600-400 MG-UNIT per tablet Take 1 tablet by mouth daily        sildenafil (VIAGRA) 50 MG tablet Take 1 tablet (50 mg) by mouth daily as needed for erectile dysfunction 6 tablet 2     glucose blood VI test strips (ACCU-CHEK TIERNEY) strip Use to test blood sugars 2 times daily or as " "directed. 100 strip 12     Multiple Vitamin (MULTIVITAMINS PO) Take  by mouth daily.       aspirin 81 MG tablet Take  by mouth daily.       No Known Allergies  Recent Labs   Lab Test  01/06/17   1007  09/19/16   1445  09/25/15   1118   09/26/14   1145  04/10/14   1453   A1C  7.3*  7.1*  7.3*   --   6.5*  8.4*   LDL  63   --   145*   --   138*  161*   HDL  38*   --   39*   --   34*  32*   TRIG  165*   --   265*   --   149  161*   ALT  46   --   40   --    --   48   CR  0.70   --   0.92   < >   --   0.86   GFRESTIMATED  >90  Non  GFR Calc     --   85   < >   --   >90   GFRESTBLACK  >90   GFR Calc     --   >90   GFR Calc     < >   --   >90   POTASSIUM  4.9   --   4.8   < >   --   4.1   TSH   --   0.50   --    --    --   0.52    < > = values in this interval not displayed.        Reviewed and updated as needed this visit by clinical staff  Tobacco  Allergies  Meds         Reviewed and updated as needed this visit by Provider            ROS:  Positive for some dizziness on current medication. Positive for skin tag on back. Positive for calf cramps.    Denies headache, insomnia, chest pain, shortness of breath, cough, heartburn, bowel issues, bladder issues, neck pain, back pain, hip pain, knee pain, ankle pain, or foot pain. Remainder of ROS is negative unless otherwise noted above or in HPI.    This document serves as a record of the services and decisions personally performed and made by Nabeel Lowe MD. It was created on his behalf by Wild Forbes, a trained medical scribe. The creation of this document is based the provider's statements to the medical scribe.  Wild Forbes 1:14 PM July 10, 2017    OBJECTIVE:   /69 (BP Location: Left arm)  Pulse 67  Temp 97.4  F (36.3  C) (Oral)  Resp 12  Ht 1.798 m (5' 10.8\")  Wt 116.9 kg (257 lb 12.8 oz)  SpO2 97%  BMI 36.16 kg/m2    EXAM:  GENERAL: healthy, alert and no distress  EYES: Eyes grossly " normal to inspection, PERRL and conjunctivae and sclerae normal. EOMI.  HENT: ear canals and TM's normal, nose and mouth without ulcers or lesions  NECK: no adenopathy, no asymmetry, masses, or scars and thyroid normal to palpation. TMJ normal. No bruits.  RESP: lungs clear to auscultation - no rales, rhonchi or wheezes  CV: regular rate and rhythm, normal S1 S2, no S3 or S4, no murmur, click or rub, no peripheral edema and peripheral pulses strong  ABDOMEN: soft, nontender, no hepatosplenomegaly, no masses and bowel sounds normal   (male): normal male genitalia without lesions or urethral discharge, no hernia  RECTAL: normal sphincter tone, no rectal masses, prostate normal size, smooth, nontender without nodules or masses  MS: no gross musculoskeletal defects noted, no edema. Calves nontender.  SKIN: 4 mm broad based pedunculated unpigmented mole with a small scab at the top, slightly inflamed with no bleeding and nontender.  NEURO: Normal strength and tone, mentation intact and speech normal. Knee reflexes normal. No tremors.  PSYCH: mentation appears normal, affect normal/bright  LYMPH: no cervical, supraclavicular, axillary, or inguinal adenopathy    Procedure: Cryotherapy on Skin Tag of back  The affected area was frozen with liquid nitrogen for 10 seconds x3 without any complications.  Patient was instructed to wash daily with soap and water, cover with bandaid if blistered or weeping, and contact if any increase in temperature, bowel drainage, or redness.  If wart persists longer than 3 weeks, return for re-treatment.     ASSESSMENT/PLAN:   (Z00.00) Encounter for routine adult health examination without abnormal findings  (primary encounter diagnosis)  Comment: Routine physical completed today.  Plan: Follow up in 1 year for annual exam. Will recheck prostate at that time.    (E11.9) Type 2 diabetes mellitus without complication, without long-term current use of insulin (H)  Comment: Stable and unchanged  "since last visit. Controlled on metformin.  Plan: Continue on current medication. Follow up as needed.    (I25.2) Old myocardial infarction  Comment: Stable and unchanged since last visit. Controlled on ticagrelor. Patient continues to see cardiology for management.  Plan: Continue on current medication. Continue to see cardiology. Follow up as needed.    (L91.8) Skin tag  Comment: Cryotherapy completed today.  Plan: DESTRUCT PREMALIGNANT LESION, FIRST        Follow up as needed.    Patient Instructions   -Please try to increase your fluid intake in the morning so that you may stay well hydrated. One possible plan could be by starting to drink iced coffee in the morning in place of your coffee.  -Try to avoid scratching the area of your skin tag so that you do not have any problems with bleeding.      COUNSELING:   Reviewed preventive health counseling, as reflected in patient instructions     reports that he quit smoking about 11 years ago. He has a 30.00 pack-year smoking history. He has never used smokeless tobacco.  Estimated body mass index is 36.16 kg/(m^2) as calculated from the following:    Height as of this encounter: 1.798 m (5' 10.8\").    Weight as of this encounter: 116.9 kg (257 lb 12.8 oz).   Weight management plan: Encouraged regular exercise and eating a healthy diet    The information in this document, created by the medical scribe for me, accurately reflects the services I personally performed and the decisions made by me. I have reviewed and approved this document for accuracy prior to leaving the patient care area.   Nabeel Lowe MD 1:14 PM July 10, 2017  [facility] for HPI/ROS submitted by the patient on 7/8/2017   PHQ-2 Score: 0    "

## 2017-07-10 NOTE — MR AVS SNAPSHOT
After Visit Summary   7/10/2017    Yimi Mcqueen    MRN: [id number]           Patient Information     Date Of Birth          1955        Visit Information        Provider Department      7/10/2017 1:00 PM Nabeel Lowe MD [facility]        Today's Diagnoses     Encounter for routine adult health examination without abnormal findings    -  1    Type 2 diabetes mellitus without complication, without long-term current use of insulin (H)        Old myocardial infarction        Skin tag          Care Instructions    -Please try to increase your fluid intake in the morning so that you may stay well hydrated. One possible plan could be by starting to drink iced coffee in the morning in place of your coffee.  -Try to avoid scratching the area of your skin tag so that you do not have any problems with bleeding.          Follow-ups after your visit        Your next 10 appointments already scheduled     Jul 13, 2017 10:00 AM CDT   Return Visit with Rhoda Cao   [facility] ([facility]    [address]  [address]  [place] [address] [phone]            Oct 23, 2017  1:00 PM CDT   TELEMEDICINE with Anand Jenkins RPH   [facility] ([facility])    [address] [facility] [address] [phone]           Note: this is not an onsite visit; there is no need to come to the facility.              Who to contact     If you have questions or need follow up information about today's clinic visit or your schedule please contact [facility] directly at [phone].  Normal or non-critical lab and imaging results will be communicated to you by MyChart, letter or phone within 4 business days after the clinic has received the results. If you do not hear from us within 7 days, please contact the clinic through MyChart or phone. If you have a critical or abnormal lab result, we will notify  "you by phone as soon as possible.  Submit refill requests through [employer] or call your pharmacy and they will forward the refill request to us. Please allow 3 business days for your refill to be completed.          Additional Information About Your Visit        elenihart Information     [employer] gives you secure access to your electronic health record. If you see a primary care provider, you can also send messages to your care team and make appointments. If you have questions, please call your primary care clinic.  If you do not have a primary care provider, please call [phone] and they will assist you.        Care EveryWhere ID     This is your Care EveryWhere ID. This could be used by other organizations to access your [place] medical records  [id number]        Your Vitals Were     Pulse Temperature Respirations Height Pulse Oximetry BMI (Body Mass Index)    67 97.4  F (36.3  C) (Oral) 12 5' 10.8\" (1.798 m) 97% 36.16 kg/m2       Blood Pressure from Last 3 Encounters:   07/10/17 112/69   12/27/16 109/70   09/19/16 90/60    Weight from Last 3 Encounters:   07/10/17 257 lb 12.8 oz (116.9 kg)   12/27/16 257 lb (116.6 kg)   09/19/16 253 lb 1.6 oz (114.8 kg)              We Performed the Following     DESTRUCT PREMALIGNANT LESION, FIRST        Primary Care Provider Office Phone # Fax #    Nabeel Lowe [phone]       [facility] [address] [address]        Equal Access to Services     KAYLIN ENRIQUEZ : Hadii ruben ku hadasho Soanaali, waaxda luqadaha, qaybta kaalmada adeegyakaley, avtar mcintyre. So [facility] [phone].    ATENCIÓN: Si habla español, tiene a layne disposición servicios gratuitos de asistencia lingüística. Llame al [phone].    We comply with applicable federal civil rights laws and [place] laws. We do not discriminate on the basis of race, color, national origin, age, disability sex, sexual orientation or gender " identity.            Thank you!     Thank you for choosing [facility]  for your care. Our goal is always to provide you with excellent care. Hearing back from our patients is one way we can continue to improve our services. Please take a few minutes to complete the written survey that you may receive in the mail after your visit with us. Thank you!             Your Updated Medication List - Protect others around you: Learn how to safely use, store and throw away your medicines at www.disposemymeds.org.          This list is accurate as of: 7/10/17 11:59 PM.  Always use your most recent med list.                   Brand Name Dispense Instructions for use Diagnosis    aspirin 81 MG tablet      Take  by mouth daily.        atorvastatin 80 MG tablet    LIPITOR     Take 80 mg by mouth daily        blood glucose monitoring test strip    ACCU-CHEK TIERNEY    100 strip    Use to test blood sugars 2 times daily or as directed.    Type 2 diabetes, HbA1C goal < 8% (H)       BRILINTA 90 MG tablet   Generic drug:  ticagrelor      Take 90 mg by mouth 2 times daily        calcium-vitamin D 600-400 MG-UNIT per tablet    CALTRATE     Take 1 tablet by mouth daily        lisinopril 10 MG tablet    PRINIVIL/ZESTRIL    90 tablet    TAKE ONE TABLET BY MOUTH EVERY DAY    Hypertension, benign essential, goal below 140/90       metFORMIN 500 MG 24 hr tablet    GLUCOPHAGE-XR     Take 500 mg by mouth 3 times daily (with meals)        metoprolol 50 MG 24 hr tablet    TOPROL-XL     Take 50 mg by mouth daily        MULTIVITAMINS PO      Take  by mouth daily.        NITROSTAT 0.4 MG sublingual tablet   Generic drug:  nitroGLYcerin      Place 0.4 mg under the tongue every 5 minutes as needed for chest pain For chest pain place 1 tablet under the tongue every 5 minutes for 3 doses. If symptoms persist 5 minutes after 1st dose call 911.        sildenafil 50 MG cap/tab    VIAGRA    6 tablet    Take 1 tablet (50 mg) by mouth daily as  needed for erectile dysfunction    ED (erectile dysfunction)

## 2017-07-13 ENCOUNTER — OFFICE VISIT (OUTPATIENT)
Dept: PSYCHOLOGY | Facility: CLINIC | Age: 62
End: 2017-07-13
Payer: COMMERCIAL

## 2017-07-13 DIAGNOSIS — F43.23 ADJUSTMENT DISORDER WITH MIXED ANXIETY AND DEPRESSED MOOD: Primary | ICD-10-CM

## 2017-07-13 PROCEDURE — 90847 FAMILY PSYTX W/PT 50 MIN: CPT | Performed by: MARRIAGE & FAMILY THERAPIST

## 2017-07-13 NOTE — MR AVS SNAPSHOT
MRN:[id number]                      After Visit Summary   7/13/2017    Yimi Mcqueen    MRN: [id number]           Visit Information        Provider Department      7/13/2017 10:00 AM Rachellemariana Rhoda Neumann [facility] Generic      Your next 10 appointments already scheduled     Aug 03, 2017 10:00 AM CDT   Return Visit with Rhoda Cao   [facility] ([facility])    [address]  [facility] [address]   [phone]            Oct 23, 2017  1:00 PM CDT   TELEMEDICINE with Anand Jenkins [facility] ([facility])    [address] [facility] [address] [phone]           Note: this is not an onsite visit; there is no need to come to the facility.              MyChart Information     [employer] gives you secure access to your electronic health record. If you see a primary care provider, you can also send messages to your care team and make appointments. If you have questions, please call your primary care clinic.  If you do not have a primary care provider, please call [phone] and they will assist you.        Care EveryWhere ID     This is your Care EveryWhere ID. This could be used by other organizations to access your [facility] medical records  [id number]        Equal Access to Services     KAYLIN ENRIQUEZ : Hadii ruben rochao Socarline, waaxda luqadaha, qaybta kaalmada adeneela, avtar mcintyre. So [facility] [phone].    ATENCIÓN: Si habla español, tiene a layne disposición servicios gratuitos de asistencia lingüística. Llame al [phone].    We comply with applicable federal civil rights laws and [place] laws. We do not discriminate on the basis of race, color, national origin, age, disability sex, sexual orientation or gender identity.

## 2017-07-13 NOTE — PROGRESS NOTES
Progress Note    Client Name: Yimi Mcqueen  Date: 7/13/2017         Service Type: Family with client present      Session Start Time: 9:30am  Session End Time: 10:15am      Session Length: 45     Session #: 22     Attendees: Client and Spouse / Significant Other    Treatment Plan Last Reviewed: 5/11/2017  PHQ-9 / HENRI-7 : Each session     DATA      Progress Since Last Session (Related to Symptoms / Goals / Homework):   Symptoms: Worsening    Homework: Partially completed      Episode of Care Goals: No improvement - PREPARATION (Decided to change - considering how); Intervened by negotiating a change plan and determining options / strategies for behavior change, identifying triggers, exploring social supports, and working towards setting a date to begin behavior change     Current / Ongoing Stressors and Concerns:  Couple are struggling with Agustin's moods. Agustin has been declining emotionally since being home from the hospital. She continues to have nightmares that are causing PTSD. Yimi is frustrated and angry and doesn't want her on more medication. Therapist encouraged Agustin to talk to her GP about starting back on lexapro, get on Adsen's schedule and meet with sleep MD.  Also to consider meeting again with Agustin for individual therapy. Yimi to understand Agustin's behavior is a response to PTSD and when she talks about divorce, she is in flight mode. She wants his approval and love. Feels his judgment and anger which scares her and she wants to flee from it.     Treatment Objective(s) Addressed in This Session:   Identify ways to help his wife manage her stress.  Boundaries with family members  Help manage his wife's MI       Intervention:   Solution Focused: encouraged couple to have expectations about the things that matter most:    Relationship coaching around anger management and communication.        ASSESSMENT: Current Emotional / Mental Status (status of  significant symptoms):   Risk status (Self / Other harm or suicidal ideation)   Client denies current fears or concerns for personal safety.   Client denies current or recent suicidal ideation or behaviors.   Client denies current or recent homicidal ideation or behaviors.   Client denies current or recent self injurious behavior or ideation.   Client denies other safety concerns.   A safety and risk management plan has not been developed at this time, however client was given the after-hours number / 911 should there be a change in any of these risk factors.     Appearance:   Appropriate    Eye Contact:   Good    Psychomotor Behavior: Normal    Attitude:   Cooperative    Orientation:   All   Speech    Rate / Production: Normal     Volume:  Normal    Mood:    Angry  Anxious  Irritable    Affect:    Appropriate    Thought Content:  Clear    Thought Form:  Coherent  Logical    Insight:    Good      Medication Review:   No current psychiatric medications prescribed     Medication Compliance:   NA     Changes in Health Issues:   None reported     Chemical Use Review:   Substance Use: Chemical use reviewed, no active concerns identified      Tobacco Use: No current tobacco use.       Collateral Reports Completed:   Not Applicable    PLAN: (Client Tasks / Therapist Tasks / Other)  Focus on helping wife getting back on medication and in to see sleep MD and Psychiatrist.      Rhoda Cao                                                         ________________________________________________________________________                                                   Treatment Plan    Client's Name: Yimi Mcqueen  YOB: 1955    Date: 5/11/2017    DSM-V Diagnoses: 309.28 - Adjustment Disorder with Mixed Anxiety and Depressed Mood By History; V71.09 - No Diagnosis  Psychosocial / Contextual Factors: Parenting with wife their adult daughter with addiction and felony  WHODAS: 12    Referral /  Collaboration:  Referral to another professional/service is not indicated at this time..    Anticipated number of session or this episode of care: 10      MeasurableTreatment Goal(s) related to diagnosis / functional impairment(s)  Goal 1: Client will lower HENRI 7 and PHQ 9 scores to 3 or below.    I will know I've met my goal when I'm less anxious and depressed due to our circumstances.      Objective #A (Client Action)    Client will learn healthy boundaries with adult children..  Status: Continued - Date(s): 5/11/2017     Intervention(s)  Therapist will teach about healthy boundaries. with adult children.    Objective #B  Client will work to be on the same page with wife..  Status: Continued - Date(s): 5/11/2017    Intervention(s)  Therapist will teach strategies to align goals..    Objective #C  Client will give daughter Laisha expectations with bottom lines..  Status: Completed - Date: 5/11/2017     Intervention(s)  Therapist will help determine bottom line..        Client has reviewed and agreed to the above plan.      Rhoda Cao  May 11, 2017

## 2017-07-14 ASSESSMENT — PATIENT HEALTH QUESTIONNAIRE - PHQ9: SUM OF ALL RESPONSES TO PHQ QUESTIONS 1-9: 3

## 2017-09-19 ENCOUNTER — TRANSFERRED RECORDS (OUTPATIENT)
Dept: HEALTH INFORMATION MANAGEMENT | Facility: CLINIC | Age: 62
End: 2017-09-19

## 2017-09-20 ENCOUNTER — OFFICE VISIT (OUTPATIENT)
Dept: FAMILY MEDICINE | Facility: CLINIC | Age: 62
End: 2017-09-20
Payer: COMMERCIAL

## 2017-09-20 VITALS
RESPIRATION RATE: 14 BRPM | TEMPERATURE: 98.6 F | OXYGEN SATURATION: 98 % | HEIGHT: 71 IN | SYSTOLIC BLOOD PRESSURE: 112 MMHG | DIASTOLIC BLOOD PRESSURE: 67 MMHG | HEART RATE: 76 BPM | BODY MASS INDEX: 36.12 KG/M2 | WEIGHT: 258 LBS

## 2017-09-20 DIAGNOSIS — A69.20 ERYTHEMA MIGRANS (LYME DISEASE): Primary | ICD-10-CM

## 2017-09-20 PROCEDURE — 99213 OFFICE O/P EST LOW 20 MIN: CPT | Performed by: PHYSICIAN ASSISTANT

## 2017-09-20 RX ORDER — DOXYCYCLINE 100 MG/1
100 CAPSULE ORAL 2 TIMES DAILY
Qty: 20 CAPSULE | Refills: 0 | Status: SHIPPED | OUTPATIENT
Start: 2017-09-20 | End: 2018-02-19

## 2017-09-20 NOTE — PROGRESS NOTES
"  SUBJECTIVE:   Yimi Mcqueen is a 62 year old male who presents to clinic today for the following health issues:    Tick bite  Onset: 2 weeks ago    Description:   Location: rash right bicep, possible tick bite  Character: round, red  Itching (Pruritis): YES- early on     Progression of Symptoms:  Redness has subsided a little    Accompanying Signs & Symptoms:  Fever: no, slight headache  Body aches or joint pain: no   Sore throat symptoms: no   Recent cold symptoms: no     History:   Previous similar rash: no     Precipitating factors:   Exposure to similar rash: no   New exposures: insect/tick    Recent travel: no     Alleviating factors:  Just gradually going away    Therapies Tried and outcome: none    Social History   Substance Use Topics     Smoking status: Former Smoker     Packs/day: 1.50     Years: 20.00     Quit date: 1/1/2006     Smokeless tobacco: Never Used     Alcohol use Yes      Comment: Occ.           Problem list and histories reviewed & adjusted, as indicated.  Additional history: as documented    Patient Active Problem List   Diagnosis     Advance Care Planning     Numerous moles     BMI 38.0-38.9,adult     Impacted cerumen     Hyperlipidemia with target LDL less than 100     Venous (peripheral) insufficiency     ED (erectile dysfunction)     CKD (chronic kidney disease) stage 2, GFR 60-89 ml/min     Hypertension, benign essential, goal below 140/90     Old myocardial infarction     Type 2 diabetes mellitus without complication, without long-term current use of insulin (H)     Past Surgical History:   Procedure Laterality Date     C MUSCLE-SKIN FLAP,LEG  \"       \"     C OPEN RX ANKLE DISLOCATN+FIXATN  ~'05    infected     COLONOSCOPY  05/12    repeat 5 yrs       Social History   Substance Use Topics     Smoking status: Former Smoker     Packs/day: 1.50     Years: 20.00     Quit date: 1/1/2006     Smokeless tobacco: Never Used     Alcohol use Yes      Comment: Occ.      Family History " "  Problem Relation Age of Onset     Cancer - colorectal Mother      Colon Cancer Mother      HEART DISEASE Father          Current Outpatient Prescriptions   Medication Sig Dispense Refill     Clopidogrel Bisulfate (PLAVIX PO)        doxycycline (VIBRAMYCIN) 100 MG capsule Take 1 capsule (100 mg) by mouth 2 times daily 20 capsule 0     nitroglycerin (NITROSTAT) 0.4 MG sublingual tablet Place 0.4 mg under the tongue every 5 minutes as needed for chest pain For chest pain place 1 tablet under the tongue every 5 minutes for 3 doses. If symptoms persist 5 minutes after 1st dose call 911.       lisinopril (PRINIVIL/ZESTRIL) 10 MG tablet TAKE ONE TABLET BY MOUTH EVERY DAY 90 tablet 2     ticagrelor (BRILINTA) 90 MG tablet Take 90 mg by mouth 2 times daily       atorvastatin (LIPITOR) 80 MG tablet Take 80 mg by mouth daily       metFORMIN (GLUCOPHAGE-XR) 500 MG 24 hr tablet Take 500 mg by mouth 3 times daily (with meals)       metoprolol (TOPROL-XL) 50 MG 24 hr tablet Take 50 mg by mouth daily       calcium-vitamin D (CALTRATE) 600-400 MG-UNIT per tablet Take 1 tablet by mouth daily        sildenafil (VIAGRA) 50 MG tablet Take 1 tablet (50 mg) by mouth daily as needed for erectile dysfunction 6 tablet 2     glucose blood VI test strips (ACCU-CHEK TIERNEY) strip Use to test blood sugars 2 times daily or as directed. 100 strip 12     Multiple Vitamin (MULTIVITAMINS PO) Take  by mouth daily.       aspirin 81 MG tablet Take  by mouth daily.       No Known Allergies      Reviewed and updated as needed this visit by clinical staff     Reviewed and updated as needed this visit by Provider       ROS:  Constitutional, HEENT, cardiovascular, pulmonary, GI, , musculoskeletal, neuro, skin, endocrine and psych systems are negative, except as otherwise noted.      OBJECTIVE:   /67 (BP Location: Left arm)  Pulse 76  Temp 98.6  F (37  C) (Oral)  Resp 14  Ht 5' 10.8\" (1.798 m)  Wt 258 lb (117 kg)  SpO2 98%  BMI 36.19 " kg/m2  Body mass index is 36.19 kg/(m^2).  GENERAL: healthy, alert and no distress  NECK: no adenopathy, no asymmetry, masses, or scars and thyroid normal to palpation  RESP: lungs clear to auscultation - no rales, rhonchi or wheezes  CV: regular rate and rhythm, normal S1 S2, no S3 or S4, no murmur, click or rub, no peripheral edema and peripheral pulses strong  MS: no gross musculoskeletal defects noted, no edema  SKIN: Right bicep shows erythematous red circular rash c/w erythema migrans. No secondary sign of infection.     Diagnostic Test Results:  none     ASSESSMENT/PLAN:   1. Erythema migrans (Lyme disease)  Too early to test for Lymes, rash consistent with EM. Start medication today. Follow up in clinic if systemic symptoms come up. Patient agrees with treatment plan.   - doxycycline (VIBRAMYCIN) 100 MG capsule; Take 1 capsule (100 mg) by mouth 2 times daily  Dispense: 20 capsule; Refill: 0    Jaleesa Youssef PA-C  [facility]

## 2017-09-20 NOTE — NURSING NOTE
"Chief Complaint   Patient presents with     Insect Bites     tick bite       Initial /67 (BP Location: Left arm)  Pulse 76  Temp 98.6  F (37  C) (Oral)  Resp 14  Ht 5' 10.8\" (1.798 m)  Wt 258 lb (117 kg)  SpO2 98%  BMI 36.19 kg/m2 Estimated body mass index is 36.19 kg/(m^2) as calculated from the following:    Height as of this encounter: 5' 10.8\" (1.798 m).    Weight as of this encounter: 258 lb (117 kg).  Medication Reconciliation: complete     Kyung Lama, RAUL      "

## 2017-09-20 NOTE — MR AVS SNAPSHOT
After Visit Summary   9/20/2017    Yimi Mcqueen    MRN: [id number]           Patient Information     Date Of Birth          1955        Visit Information        Provider Department      9/20/2017 9:20 AM Jaleesa Youssef PA-C [facility]        Today's Diagnoses     Erythema migrans (Lyme disease)    -  1       Follow-ups after your visit        Your next 10 appointments already scheduled     Oct 23, 2017  1:00 PM CDT   TELEMEDICINE with Anand Jenkins RPH   [facility] [place] ([facility])    [address] [facility] [id number] [phone]           Note: this is not an onsite visit; there is no need to come to the facility.              Who to contact     If you have questions or need follow up information about today's clinic visit or your schedule please contact [facility] directly at [phone].  Normal or non-critical lab and imaging results will be communicated to you by MyChart, letter or phone within 4 business days after the clinic has received the results. If you do not hear from us within 7 days, please contact the clinic through [employer] or phone. If you have a critical or abnormal lab result, we will notify you by phone as soon as possible.  Submit refill requests through [employer] or call your pharmacy and they will forward the refill request to us. Please allow 3 business days for your refill to be completed.          Additional Information About Your Visit        MyChart Information     [employer] gives you secure access to your electronic health record. If you see a primary care provider, you can also send messages to your care team and make appointments. If you have questions, please call your primary care clinic.  If you do not have a primary care provider, please call [phone] and they will assist you.        Care EveryWhere ID     This is your Care EveryWhere ID. This could be used by other  "organizations to access your [facility] medical records  [id number]        Your Vitals Were     Pulse Temperature Respirations Height Pulse Oximetry BMI (Body Mass Index)    76 98.6  F (37  C) (Oral) 14 5' 10.8\" (1.798 m) 98% 36.19 kg/m2       Blood Pressure from Last 3 Encounters:   09/20/17 112/67   07/10/17 112/69   12/27/16 109/70    Weight from Last 3 Encounters:   09/20/17 258 lb (117 kg)   07/10/17 257 lb 12.8 oz (116.9 kg)   12/27/16 257 lb (116.6 kg)              Today, you had the following     No orders found for display         Today's Medication Changes          These changes are accurate as of: 9/20/17 12:04 PM.  If you have any questions, ask your nurse or doctor.               Start taking these medicines.        Dose/Directions    doxycycline 100 MG capsule   Commonly known as:  VIBRAMYCIN   Used for:  Erythema migrans (Lyme disease)   Started by:  Jaleesa Youssef PA-C        Dose:  100 mg   Take 1 capsule (100 mg) by mouth 2 times daily   Quantity:  20 capsule   Refills:  0            Where to get your medicines      These medications were sent to [facility] - [place], [place] - [address] [place] [address]  [address] [place] [address], [facility] [address]     Phone:  [phone]     doxycycline 100 MG capsule                Primary Care Provider Office Phone # Fax #    Nabeel Lowe [phone]       [address] [address]  [facility] [address]        Equal Access to Services     [facility]: Hadii ruben joya hadasho Socarline, waaxda luqadaha, qaybta kaalmasarina gonzalezay idiin hayaan adeeg kharash la'aan . So [facility] [phone].    ATENCIÓN: Si habla español, tiene a layne disposición servicios gratuitos de asistencia lingüística. Llame al [phone].    We comply with applicable federal civil rights laws and [place] laws. We do not discriminate on the basis of race, color, national origin, age, disability sex, sexual orientation or gender " identity.            Thank you!     Thank you for choosing [facility]  for your care. Our goal is always to provide you with excellent care. Hearing back from our patients is one way we can continue to improve our services. Please take a few minutes to complete the written survey that you may receive in the mail after your visit with us. Thank you!             Your Updated Medication List - Protect others around you: Learn how to safely use, store and throw away your medicines at www.disposemymeds.org.          This list is accurate as of: 9/20/17 12:04 PM.  Always use your most recent med list.                   Brand Name Dispense Instructions for use Diagnosis    aspirin 81 MG tablet      Take  by mouth daily.        atorvastatin 80 MG tablet    LIPITOR     Take 80 mg by mouth daily        blood glucose monitoring test strip    ACCU-CHEK TIERNEY    100 strip    Use to test blood sugars 2 times daily or as directed.    Type 2 diabetes, HbA1C goal < 8% (H)       BRILINTA 90 MG tablet   Generic drug:  ticagrelor      Take 90 mg by mouth 2 times daily        calcium-vitamin D 600-400 MG-UNIT per tablet    CALTRATE     Take 1 tablet by mouth daily        doxycycline 100 MG capsule    VIBRAMYCIN    20 capsule    Take 1 capsule (100 mg) by mouth 2 times daily    Erythema migrans (Lyme disease)       lisinopril 10 MG tablet    PRINIVIL/ZESTRIL    90 tablet    TAKE ONE TABLET BY MOUTH EVERY DAY    Hypertension, benign essential, goal below 140/90       metFORMIN 500 MG 24 hr tablet    GLUCOPHAGE-XR     Take 500 mg by mouth 3 times daily (with meals)        metoprolol 50 MG 24 hr tablet    TOPROL-XL     Take 50 mg by mouth daily        MULTIVITAMINS PO      Take  by mouth daily.        NITROSTAT 0.4 MG sublingual tablet   Generic drug:  nitroGLYcerin      Place 0.4 mg under the tongue every 5 minutes as needed for chest pain For chest pain place 1 tablet under the tongue every 5 minutes for 3 doses. If  symptoms persist 5 minutes after 1st dose call 911.        PLAVIX PO           sildenafil 50 MG tablet    VIAGRA    6 tablet    Take 1 tablet (50 mg) by mouth daily as needed for erectile dysfunction    ED (erectile dysfunction)

## 2017-09-22 ENCOUNTER — TELEPHONE (OUTPATIENT)
Dept: FAMILY MEDICINE | Facility: CLINIC | Age: 62
End: 2017-09-22

## 2017-10-05 ENCOUNTER — TELEPHONE (OUTPATIENT)
Dept: FAMILY MEDICINE | Facility: CLINIC | Age: 62
End: 2017-10-05

## 2017-10-05 DIAGNOSIS — Z91.89 RISK OF EXPOSURE TO LYME DISEASE: Primary | ICD-10-CM

## 2017-10-05 DIAGNOSIS — Z91.89 RISK OF EXPOSURE TO LYME DISEASE: ICD-10-CM

## 2017-10-05 PROCEDURE — 36415 COLL VENOUS BLD VENIPUNCTURE: CPT | Performed by: FAMILY MEDICINE

## 2017-10-05 PROCEDURE — 86618 LYME DISEASE ANTIBODY: CPT | Performed by: FAMILY MEDICINE

## 2017-10-05 NOTE — TELEPHONE ENCOUNTER
The rash that the provider described is diagnostic for Lymes.  There is no need or recommendation to delay antibiotics.  He should take this for Lyme infection.  ASA Jimenez

## 2017-10-05 NOTE — TELEPHONE ENCOUNTER
Huddle with Ya - encouraged patient to come in and be tested for Lyme's now that time has passed - verbal order given with read back -    Return call to patient and wife - writer spent significant amount of time explaining that reasoning for testing - they were frustrated with the change to recommendations - explained if patient can get an objective answer or not be treated with antibiotics this would be best practice option - patient verbalized understanding and scheduled lab only 10/5/2017    He verbalized understanding and agrees with plan    Closing encounter - no further actions needed at this time    Que Silverman RN

## 2017-10-05 NOTE — TELEPHONE ENCOUNTER
I do not understand what jersonour question is   they were given the doxy due to possible lymes exposure as it was too soon to testr for lymes  If they have not take it then they could now do the lymes test to see if they need it  Come talk to me

## 2017-10-05 NOTE — TELEPHONE ENCOUNTER
Routing to provider - Chrissy - please review and advise as appropriate  1. Consent to communicate 2012 for Jacqueline - return call - patient has not started antibiotic at this time - states he reviewed this with provider and agreed to start now when back from vacation - medication is at [facility] mail order - may be another week until he gets rx - States in last week has had URI symptoms - headache, cough, sore throat - but otherwise denies other symptoms - discussed OK to start if this was plan from provider - reviewed to complete antibiotic and return call from any new symptoms of Lymes after 48 hours of starting antibiotic  2. Last office visit 9/20/2017 - no notes regarding delaying treatment  3. Are you OK with this plan?    Thank you,  Que Silverman RN

## 2017-10-05 NOTE — TELEPHONE ENCOUNTER
Reason for call:  Other   Patient called regarding (reason for call): prescription  Additional comments: Pt's wife called with a question about a prescription given to her  by Katja on 9/20/17 for doxycycline (VIBRAMYCIN) 100 MG capsule.  She stated that they don't know what the medication course was supposed to be and would like to be informed of that.      Phone number to reach patient:  Home number on file [phone] (home)    Best Time:  Any    Can we leave a detailed message on this number?  YES

## 2017-10-06 LAB — B BURGDOR IGG+IGM SER QL: 0.02 (ref 0–0.89)

## 2017-10-16 ENCOUNTER — ALLIED HEALTH/NURSE VISIT (OUTPATIENT)
Dept: NURSING | Facility: CLINIC | Age: 62
End: 2017-10-16
Payer: COMMERCIAL

## 2017-10-16 DIAGNOSIS — Z23 NEED FOR PROPHYLACTIC VACCINATION AND INOCULATION AGAINST INFLUENZA: Primary | ICD-10-CM

## 2017-10-16 PROCEDURE — 90471 IMMUNIZATION ADMIN: CPT

## 2017-10-16 PROCEDURE — 90686 IIV4 VACC NO PRSV 0.5 ML IM: CPT

## 2017-10-16 PROCEDURE — 99207 ZZC NO CHARGE NURSE ONLY: CPT

## 2017-10-16 NOTE — PROGRESS NOTES
Injectable Influenza Immunization Documentation    1.  Is the person to be vaccinated sick today?   No    2. Does the person to be vaccinated have an allergy to a component   of the vaccine?   No    3. Has the person to be vaccinated ever had a serious reaction   to influenza vaccine in the past?   No    4. Has the person to be vaccinated ever had Guillain-Barré syndrome?   No    Form completed by Darryl Crespo MA

## 2017-10-16 NOTE — MR AVS SNAPSHOT
After Visit Summary   10/16/2017    Yimi Mcqueen    MRN: [id number]           Patient Information     Date Of Birth          1955        Visit Information        Provider Department      10/16/2017 1:45 PM RD FP MA/LPN [facility]        Today's Diagnoses     Need for prophylactic vaccination and inoculation against influenza    -  1       Follow-ups after your visit        Your next 10 appointments already scheduled     Oct 16, 2017  1:45 PM CDT   Nurse Only with KEAGAN SMITH MA/LPN   [facility] ([facility])    [address] [place] [address] [phone]            Oct 23, 2017  1:00 PM CDT   TELEMEDICINE with Anand Jenkins RPH   [facility] ([facility])    [address] [facility] [address] [phone]           Note: this is not an onsite visit; there is no need to come to the facility.              Who to contact     If you have questions or need follow up information about today's clinic visit or your schedule please contact [facility] directly at [phone].  Normal or non-critical lab and imaging results will be communicated to you by PlexPresshart, letter or phone within 4 business days after the clinic has received the results. If you do not hear from us within 7 days, please contact the clinic through PlexPresshart or phone. If you have a critical or abnormal lab result, we will notify you by phone as soon as possible.  Submit refill requests through Prolify or call your pharmacy and they will forward the refill request to us. Please allow 3 business days for your refill to be completed.          Additional Information About Your Visit        MyChart Information     Prolify gives you secure access to your electronic health record. If you see a primary care provider, you can also send messages to your care team and make appointments. If you have  questions, please call your primary care clinic.  If you do not have a primary care provider, please call [phone] and they will assist you.        Care EveryWhere ID     This is your Care EveryWhere ID. This could be used by other organizations to access your [place] medical records  [id number]         Blood Pressure from Last 3 Encounters:   09/20/17 112/67   07/10/17 112/69   12/27/16 109/70    Weight from Last 3 Encounters:   09/20/17 258 lb (117 kg)   07/10/17 257 lb 12.8 oz (116.9 kg)   12/27/16 257 lb (116.6 kg)              We Performed the Following     FLU VAC, SPLIT VIRUS IM > 3 YO (QUADRIVALENT) [92488]     Vaccine Administration, Initial [97240]        Primary Care Provider Office Phone # Fax #    Nabeel Lowe [phone]       [address] [facility] [address]  [facility] [address]        Equal Access to Services     KAYLIN ENRIQUEZ : Hadii aad ku hadasho Soomaali, waaxda luqadaha, qaybta kaalmada adeegyada, waxay idiin hayaan adeeg kharash abelardo . So [facility] [phone].    ATENCIÓN: Si habla español, tiene a layne disposición servicios gratuitos de asistencia lingüística. Les al [phone].    We comply with applicable federal civil rights laws and [place] laws. We do not discriminate on the basis of race, color, national origin, age, disability, sex, sexual orientation, or gender identity.            Thank you!     Thank you for choosing [facility]  for your care. Our goal is always to provide you with excellent care. Hearing back from our patients is one way we can continue to improve our services. Please take a few minutes to complete the written survey that you may receive in the mail after your visit with us. Thank you!             Your Updated Medication List - Protect others around you: Learn how to safely use, store and throw away your medicines at www.disposemymeds.org.          This list is accurate as of: 10/16/17 12:06 PM.  Always use your most  recent med list.                   Brand Name Dispense Instructions for use Diagnosis    aspirin 81 MG tablet      Take  by mouth daily.        atorvastatin 80 MG tablet    LIPITOR     Take 80 mg by mouth daily        blood glucose monitoring test strip    ACCU-CHEK TIERNEY    100 strip    Use to test blood sugars 2 times daily or as directed.    Type 2 diabetes, HbA1C goal < 8% (H)       BRILINTA 90 MG tablet   Generic drug:  ticagrelor      Take 90 mg by mouth 2 times daily        calcium-vitamin D 600-400 MG-UNIT per tablet    CALTRATE     Take 1 tablet by mouth daily        doxycycline 100 MG capsule    VIBRAMYCIN    20 capsule    Take 1 capsule (100 mg) by mouth 2 times daily    Erythema migrans (Lyme disease)       lisinopril 10 MG tablet    PRINIVIL/ZESTRIL    90 tablet    TAKE ONE TABLET BY MOUTH EVERY DAY    Hypertension, benign essential, goal below 140/90       metFORMIN 500 MG 24 hr tablet    GLUCOPHAGE-XR     Take 500 mg by mouth 3 times daily (with meals)        metoprolol 50 MG 24 hr tablet    TOPROL-XL     Take 50 mg by mouth daily        MULTIVITAMINS PO      Take  by mouth daily.        NITROSTAT 0.4 MG sublingual tablet   Generic drug:  nitroGLYcerin      Place 0.4 mg under the tongue every 5 minutes as needed for chest pain For chest pain place 1 tablet under the tongue every 5 minutes for 3 doses. If symptoms persist 5 minutes after 1st dose call 911.        PLAVIX PO           sildenafil 50 MG tablet    VIAGRA    6 tablet    Take 1 tablet (50 mg) by mouth daily as needed for erectile dysfunction    ED (erectile dysfunction)

## 2017-12-07 ENCOUNTER — MYC MEDICAL ADVICE (OUTPATIENT)
Dept: FAMILY MEDICINE | Facility: CLINIC | Age: 62
End: 2017-12-07

## 2017-12-20 ENCOUNTER — TRANSFERRED RECORDS (OUTPATIENT)
Dept: HEALTH INFORMATION MANAGEMENT | Facility: CLINIC | Age: 62
End: 2017-12-20

## 2018-01-02 DIAGNOSIS — I10 HYPERTENSION, BENIGN ESSENTIAL, GOAL BELOW 140/90: ICD-10-CM

## 2018-01-02 RX ORDER — LISINOPRIL 10 MG/1
TABLET ORAL
Qty: 90 TABLET | Refills: 0 | Status: SHIPPED | OUTPATIENT
Start: 2018-01-02 | End: 2018-03-28

## 2018-01-02 NOTE — TELEPHONE ENCOUNTER
Requested Prescriptions   Pending Prescriptions Disp Refills     lisinopril (PRINIVIL/ZESTRIL) 10 MG tablet [Pharmacy Med Name: LISINOPRIL 10MG TABS] 90 tablet 2    Last Written Prescription Date: 4/11/17  Last Fill Quantity: 90,  # refills: 2   Last Office Visit with [facility], [facility] or [facility] prescribing provider:  9/20/17   Future Office Visit:        Sig: TAKE ONE TABLET BY MOUTH EVERY DAY    ACE Inhibitors (Including Combos) Protocol Passed    1/2/2018 12:58 PM       Passed - Blood pressure under 140/90    BP Readings from Last 3 Encounters:   09/20/17 112/67   07/10/17 112/69   12/27/16 109/70                Passed - Recent or future visit with authorizing provider's specialty    Patient had office visit in the last year or has a visit in the next 30 days with authorizing provider.  See chart review.              Passed - Patient is age 18 or older       Passed - Normal serum creatinine on file in past 12 months    Recent Labs   Lab Test  01/06/17   1007   CR  0.70            Passed - Normal serum potassium on file in past 12 months    Recent Labs   Lab Test  01/06/17   1007   POTASSIUM  4.9

## 2018-01-02 NOTE — TELEPHONE ENCOUNTER
Prescription approved per [facility] [place] Refill Protocol.    Pamella Cavazos RN   [facility]

## 2018-01-11 ENCOUNTER — TRANSFERRED RECORDS (OUTPATIENT)
Dept: HEALTH INFORMATION MANAGEMENT | Facility: CLINIC | Age: 63
End: 2018-01-11

## 2018-02-08 ENCOUNTER — TRANSFERRED RECORDS (OUTPATIENT)
Dept: HEALTH INFORMATION MANAGEMENT | Facility: CLINIC | Age: 63
End: 2018-02-08

## 2018-02-08 LAB
CREAT SERPL-MCNC: 0.79 MG/DL (ref 0.7–1.25)
GFR SERPL CREATININE-BSD FRML MDRD: 97 ML/MIN/1.73M2
GLUCOSE SERPL-MCNC: 153 MG/DL (ref 65–99)
HBA1C MFR BLD: 7.9 % (ref 4–6)
POTASSIUM SERPL-SCNC: 4.7 MMOL/L (ref 3.5–5.3)

## 2018-02-19 ENCOUNTER — ALLIED HEALTH/NURSE VISIT (OUTPATIENT)
Dept: PHARMACY | Facility: CLINIC | Age: 63
End: 2018-02-19
Payer: COMMERCIAL

## 2018-02-19 DIAGNOSIS — E78.5 HYPERLIPIDEMIA WITH TARGET LDL LESS THAN 100: ICD-10-CM

## 2018-02-19 DIAGNOSIS — I10 HYPERTENSION, BENIGN ESSENTIAL, GOAL BELOW 140/90: ICD-10-CM

## 2018-02-19 DIAGNOSIS — I25.2 OLD MYOCARDIAL INFARCTION: ICD-10-CM

## 2018-02-19 DIAGNOSIS — E11.9 TYPE 2 DIABETES MELLITUS WITHOUT COMPLICATION, WITHOUT LONG-TERM CURRENT USE OF INSULIN (H): Primary | ICD-10-CM

## 2018-02-19 PROCEDURE — 99607 MTMS BY PHARM ADDL 15 MIN: CPT | Mod: U4 | Performed by: PHARMACIST

## 2018-02-19 PROCEDURE — 99605 MTMS BY PHARM NP 15 MIN: CPT | Mod: U4 | Performed by: PHARMACIST

## 2018-02-19 RX ORDER — BLOOD-GLUCOSE METER
1 KIT MISCELLANEOUS
Qty: 1 KIT | Refills: 0 | Status: SHIPPED | OUTPATIENT
Start: 2018-02-19 | End: 2018-06-27

## 2018-02-19 RX ORDER — BISMUTH SUBSALICYLATE 262MG/15ML
SUSPENSION, ORAL (FINAL DOSE FORM) ORAL
Qty: 100 EACH | Refills: 11 | Status: SHIPPED | OUTPATIENT
Start: 2018-02-19 | End: 2018-06-27

## 2018-02-19 NOTE — PATIENT INSTRUCTIONS
Dear Yimi,     It was a pleasure talking with you today regarding your medications and medical concerns. The following is a summary of what we discussed.     1. FYI--please discuss with Dr. Blank your endocrinologist about adding another Diabetes drug called Trulicity . This is a once weekly injection to lower blood sugars and curb appetite. Please sign up for rx savings card at [employer].  Also-check [employer]- for any savings card -- you can switch meters and I will order you a FREE Glucocard-vital meter --100 test strips at [place] is about 16.99$. Much more affordable. I sent rx for meter , strips, lancets to the [facility] pharmacy today for you.       2.  FYI-- please avoid all nsaids --these are advil, aleve , ibuprofen, naproxen type products due to past heart attack. Use Arthritis strength tylenol and otc rubs/patches like icy-hot.       Follow-up via phone call -Monday July 16th  At 10:00 am .     Thank you for your time and please feel free to call ([phone] voicemail/pager or [phone] clinic main line) or e-mail ([email or web address]) with any questions.     Anand Jenkins Rph.  Medication Therapy Management Provider  [phone]

## 2018-02-19 NOTE — Clinical Note
jovan--I spoke with rochelle today --he is agreeable to start trulicity but wants dr. epstein to order it for him. Lets see how it goes with next a1c/weight.   Anand Jenkins [facility]. Medication Therapy Management Provider [phone]

## 2018-02-19 NOTE — MR AVS SNAPSHOT
After Visit Summary   2/19/2018    Yimi Mcqueen    MRN: [id number]           Patient Information     Date Of Birth          1955        Visit Information        Provider Department      2/19/2018 1:00 PM Anand Jenkins RPH [facility]        Today's Diagnoses     Type 2 diabetes mellitus without complication, without long-term current use of insulin (H)    -  1    Hyperlipidemia with target LDL less than 100        Hypertension, benign essential, goal below 140/90        Old myocardial infarction          Care Instructions    Dear Yimi,     It was a pleasure talking with you today regarding your medications and medical concerns. The following is a summary of what we discussed.     1. FYI--please discuss with Dr. Blank your endocrinologist about adding another Diabetes drug called Trulicity . This is a once weekly injection to lower blood sugars and curb appetite. Please sign up for rx savings card at [employer].  Also-check [employer]- for any savings card -- you can switch meters and I will order you a FREE Glucocard-vital meter --100 test strips at [place] is about 16.99$. Much more affordable. I sent rx for meter , strips, lancets to the [facility] pharmacy today for you.       2.  FYI-- please avoid all nsaids --these are advil, aleve , ibuprofen, naproxen type products due to past heart attack. Use Arthritis strength tylenol and otc rubs/patches like icy-hot.       Follow-up via phone call -Monday July 16th  At 10:00 am .     Thank you for your time and please feel free to call ([phone] voicemail/pager or [phone] clinic main line) or e-mail ([email or web address]) with any questions.     Anand Jenkins Rph.  Medication Therapy Management Provider  [phone]              Follow-ups after your visit        Your next 10 appointments already scheduled     Jul 16, 2018 10:00 AM CDT   TELEMEDICINE with Anand Jenkins RPH   [place]  [facility] ([facility])    [address]  [facility] [address] [phone]           Note: this is not an onsite visit; there is no need to come to the facility.              Who to contact     If you have questions or need follow up information about today's clinic visit or your schedule please contact [facility] directly at [phone].  Normal or non-critical lab and imaging results will be communicated to you by Clean Energy Systemshart, letter or phone within 4 business days after the clinic has received the results. If you do not hear from us within 7 days, please contact the clinic through [employer] or phone. If you have a critical or abnormal lab result, we will notify you by phone as soon as possible.  Submit refill requests through [employer] or call your pharmacy and they will forward the refill request to us. Please allow 3 business days for your refill to be completed.          Additional Information About Your Visit        [employer] Information     [employer] gives you secure access to your electronic health record. If you see a primary care provider, you can also send messages to your care team and make appointments. If you have questions, please call your primary care clinic.  If you do not have a primary care provider, please call [phone] and they will assist you.        Care EveryWhere ID     This is your Care EveryWhere ID. This could be used by other organizations to access your [place] medical records  [id number]         Blood Pressure from Last 3 Encounters:   09/20/17 112/67   07/10/17 112/69   12/27/16 109/70    Weight from Last 3 Encounters:   09/20/17 258 lb (117 kg)   07/10/17 257 lb 12.8 oz (116.9 kg)   12/27/16 257 lb (116.6 kg)              Today, you had the following     No orders found for display         Today's Medication Changes          These changes are accurate as of 2/19/18  4:50 PM.  If you have any questions, ask your nurse or  doctor.               Start taking these medicines.        Dose/Directions    blood glucose monitoring lancets   Used for:  Type 2 diabetes mellitus without complication, without long-term current use of insulin (H)   Started by:  Anand Jenkins RPH        Use to test blood sugar 2-3 times daily or as directed.   Quantity:  100 each   Refills:  11       GLUCOCARD VITAL MONITOR W/DEVICE Kit   Used for:  Type 2 diabetes mellitus without complication, without long-term current use of insulin (H)   Started by:  Anand Jenkins RPH        Dose:  1 Device   1 Device 3 times daily (before meals)   Quantity:  1 kit   Refills:  0         These medicines have changed or have updated prescriptions.        Dose/Directions    * blood glucose monitoring test strip   Commonly known as:  ACCU-CHEK TIERNEY   This may have changed:  Another medication with the same name was added. Make sure you understand how and when to take each.   Used for:  Type 2 diabetes, HbA1C goal < 8% (H)   Changed by:  Anand Jenkins RPH        Use to test blood sugars 2 times daily or as directed.   Quantity:  100 strip   Refills:  12       * blood glucose monitoring test strip   Commonly known as:  GLUCOCARD VITAL TEST   This may have changed:  You were already taking a medication with the same name, and this prescription was added. Make sure you understand how and when to take each.   Used for:  Type 2 diabetes mellitus without complication, without long-term current use of insulin (H)   Changed by:  Anand Jenkins RPH        Use to test blood sugar 2-3 times daily or as directed.   Quantity:  100 each   Refills:  11       * Notice:  This list has 2 medication(s) that are the same as other medications prescribed for you. Read the directions carefully, and ask your doctor or other care provider to review them with you.      Stop taking these medicines if you haven't already. Please contact your care team if you have questions.     BRILINTA 90 MG  tablet   Generic drug:  ticagrelor   Stopped by:  Anand Jenkins [facility]                Where to get your medicines      These medications were sent to [facility] - [place], [place] - [address]  [address], [place] [address]     Phone:  [phone]     blood glucose monitoring lancets    blood glucose monitoring test strip    GLUCOCARD VITAL MONITOR W/DEVICE Kit                Primary Care Provider Office Phone # Fax #    Nabeel Lowe [phone]       [address] [facility] [address]  [facility] [address]        Equal Access to Services     [facility]: Hadii aad ku hadasho Soomaali, waaxda luqadaha, qaybta kaalmada adeegyada, waxay idiin hayaan adeeg kharash lakarissa . So [facility] [phone].    ATENCIÓN: Si habla español, tiene a layne disposición servicios gratuitos de asistencia lingüística. Llame al [phone].    We comply with applicable federal civil rights laws and [place] laws. We do not discriminate on the basis of race, color, national origin, age, disability, sex, sexual orientation, or gender identity.            Thank you!     Thank you for choosing [facility]  for your care. Our goal is always to provide you with excellent care. Hearing back from our patients is one way we can continue to improve our services. Please take a few minutes to complete the written survey that you may receive in the mail after your visit with us. Thank you!             Your Updated Medication List - Protect others around you: Learn how to safely use, store and throw away your medicines at www.disposemymeds.org.          This list is accurate as of 2/19/18  4:50 PM.  Always use your most recent med list.                   Brand Name Dispense Instructions for use Diagnosis    aspirin 81 MG tablet      Take  by mouth daily.        atorvastatin 80 MG tablet    LIPITOR     Take 80 mg by mouth daily        blood glucose monitoring lancets     100 each    Use  to test blood sugar 2-3 times daily or as directed.    Type 2 diabetes mellitus without complication, without long-term current use of insulin (H)       * blood glucose monitoring test strip    ACCU-CHEK TIERNEY    100 strip    Use to test blood sugars 2 times daily or as directed.    Type 2 diabetes, HbA1C goal < 8% (H)       * blood glucose monitoring test strip    GLUCOCARD VITAL TEST    100 each    Use to test blood sugar 2-3 times daily or as directed.    Type 2 diabetes mellitus without complication, without long-term current use of insulin (H)       calcium-vitamin D 600-400 MG-UNIT per tablet    CALTRATE     Take 1 tablet by mouth daily        GLUCOCARD VITAL MONITOR W/DEVICE Kit     1 kit    1 Device 3 times daily (before meals)    Type 2 diabetes mellitus without complication, without long-term current use of insulin (H)       lisinopril 10 MG tablet    PRINIVIL/ZESTRIL    90 tablet    TAKE ONE TABLET BY MOUTH EVERY DAY    Hypertension, benign essential, goal below 140/90       metFORMIN 500 MG 24 hr tablet    GLUCOPHAGE-XR     Take 500 mg by mouth 3 times daily (with meals)        metoprolol succinate 50 MG 24 hr tablet    TOPROL-XL     Take 50 mg by mouth daily        MULTIVITAMINS PO      Take  by mouth daily.        NITROSTAT 0.4 MG sublingual tablet   Generic drug:  nitroGLYcerin      Place 0.4 mg under the tongue every 5 minutes as needed for chest pain For chest pain place 1 tablet under the tongue every 5 minutes for 3 doses. If symptoms persist 5 minutes after 1st dose call 911.        PLAVIX PO      Take 75 mg by mouth daily        sildenafil 50 MG tablet    VIAGRA    6 tablet    Take 1 tablet (50 mg) by mouth daily as needed for erectile dysfunction    ED (erectile dysfunction)       * Notice:  This list has 2 medication(s) that are the same as other medications prescribed for you. Read the directions carefully, and ask your doctor or other care provider to review them with you.

## 2018-02-19 NOTE — PROGRESS NOTES
SUBJECTIVE/OBJECTIVE:                                                    Yimi Mcqueen is a 62 year old male called for an initial visit for Medication Therapy Management.  He was referred to me from -MT program.     Chief Complaint:  Struggles to lose weight =wants a1c at goal. Thinks high 7's. Weight 249.     Cost of bs test strips too high --what to do ?      Personal Healthcare Goals: lose another 10 lbs -20lbs.(230 is his Goal.)   Plan - cards wants him on meditarranean diet   He's a gourmet cook, organic garden , frustrated with nutrtion classes --waste of time --what other options do we have for him ?  He's retired now as well.     Allergies/ADRs: Reviewed in Epic  Tobacco: History of tobacco dependence - quit 16 years ago.  Alcohol: Less than 1 beverage / month  Caffeine: 1 cups/day of coffee  Activity: bike -100 miles /week x 3-5 years. 4,000  Miles/year.  PMH: Reviewed in Epic; DM x 3 years now;  Recent MI sept. 2016, 2 stents 10-10-16    Medication Adherence: issues found and discussed below and sets up own med boxes    Type -2 DM: metformin- ER 500mg tid now(2 in am and 1 pm )  - checking bs's -- prn - 125-135 fasting in am if he behaves himself .rare spot check -2 hrs . Post - 135-145.  High is 200. That is rare.     Last a1c is 7.9%? --he 'd like to lose weight and work on a different plan yet he eats non processed foods and shops outer [place] at [employer] and grows organic veggies at home.  He has changed diet now last 6 months more healthy -now weighs at home 249lbs.     His cards md wants him to try meditearrean diet --he is on a veggies/ fruits diet , avoiding more red meat -just monthly now. Chicken qod, fish weekly .     He does see endo md Dr. Blank -patient states he was told a1c high again MTM previously had ok per pcp milton to start glp-1 med and pt. Thinks dr. blank also discussed that recently.          ACS:  MI (2016)-- 2 coronary artery drug eleuting stents -now takes  clopidogrel 75mg./day (off brillinta due to cost) , also metoprolol er 50mg qday and sl-prn nitro --carries with him on his key chain.        Hyperlipidemia: Current therapy includes Atorvastatin 80mg. once daily and exercise.  Pt reports no significant myalgias or other side effects.         Hypertension: Current medications include Lisinopril 10mg qam and metoprolol 50mg er tab hs .  Patient does self-monitor BP. Home BP monitoring in range of 120's systolic over 70's diastolic.pulse range 65-68.  Patient reports no current medication side effects.    OA:  He takes some ibuprofen , bikes a lot 800-1,600 mg /day --now 400mg ./day . He asks if its safe for him to take nsaids post mi ?       Current labs include:  BP Readings from Last 3 Encounters:   09/20/17 112/67   07/10/17 112/69   12/27/16 109/70     Today's Vitals:   Lab Results   Component Value Date    A1C 7.3 01/06/2017   .  Lab Results   Component Value Date    CHOL 134 01/06/2017     Lab Results   Component Value Date    TRIG 165 01/06/2017     Lab Results   Component Value Date    HDL 38 01/06/2017     Lab Results   Component Value Date    LDL 63 01/06/2017       Liver Function Studies -   Recent Labs   Lab Test  01/06/17   1007   PROTTOTAL  7.5   ALBUMIN  4.1   BILITOTAL  1.0   ALKPHOS  68   AST  25   ALT  46       Lab Results   Component Value Date    UCRR 93 01/06/2017    MICROL 7 01/06/2017    UMALCR 7.58 01/06/2017       Last Basic Metabolic Panel:  Lab Results   Component Value Date     01/06/2017      Lab Results   Component Value Date    POTASSIUM 4.9 01/06/2017     Lab Results   Component Value Date    CHLORIDE 106 01/06/2017     Lab Results   Component Value Date    BUN 18 01/06/2017     Lab Results   Component Value Date    CR 0.70 01/06/2017     GFR Estimate   Date Value Ref Range Status   01/06/2017 >90  Non  GFR Calc   >60 mL/min/1.7m2 Final   09/25/2015 85 >60 mL/min/1.7m2 Final     Comment:     Non   GFR Calc   06/24/2015 88 >60 mL/min/1.7m2 Final     Comment:     Non  GFR Calc     GFR Estimate If Black   Date Value Ref Range Status   01/06/2017 >90   GFR Calc   >60 mL/min/1.7m2 Final   09/25/2015 >90   GFR Calc   >60 mL/min/1.7m2 Final   06/24/2015 >90   GFR Calc   >60 mL/min/1.7m2 Final     TSH   Date Value Ref Range Status   09/19/2016 0.50 0.40 - 4.00 mU/L Final   ]    Most Recent Immunizations   Administered Date(s) Administered     Influenza Vaccine IM 3yrs+ 4 Valent IIV4 10/16/2017     Pneumo Conj 13-V (2010&after) 09/25/2015     Pneumococcal 23 valent 09/19/2016     TDAP Vaccine (Adacel) 04/10/2014       ASSESSMENT:                                                       Current medications were reviewed with him today.     Medication Adherence: no  Issues now.       Diabetes: Needs Improvement. Patient is not meeting A1c goal of < 7%. Self monitoring of blood glucose is not at goal of fasting  mg/dL and post prandial < 150 mg/dL. Pt would benefit from minimum SMBG: Check blood sugars fasting, and occasionally 2 hours after starting a meal. FYI-- pt.. Wants bs meter with less $$ test strips --mtm will order him a free glucocard vital + affordable supplies --see plan for details.   Metformin :  stay on the same dose.  GLP-1 agonist (Trulcity) :  Pt. To obtain rx form [facility] --we discussed start with low dose first month , then increase dose in month -2 if well tolerated . MTm educated patient about potential se's.   Increased exercise--keep riding bike !  Updated Hemoglobin A1c-high 7's --unsure of exact result.   Weight loss recommended--thru low carb diet and added glp-1 med.      ACS: stable ; fyi-- long discussion about nsaids (other than daily asa) and MI risk . He has high cv risk so mtm feels avoid , use tylenol /icy hot instead.     Hyperlipidemia: stable. Pt is on high intensity statin which is indicated based on 2013  ACC/AHA guidelines for lipid management.      Hypertension: Stable. Patient is meeting BP goal of < 140/90mmHg.        PLAN:                                                        1. FYI--please discuss with Dr. Blank your endocrinologist about adding another Diabetes drug called Trulicity . This is a once weekly injection to lower blood sugars and curb appetite. Please sign up for rx savings card at [employer].  Also-check [employer]- for any savings card -- you can switch meters and I will order you a FREE Glucocard-vital meter --100 test strips at [place] is about 16.99$. Much more affordable. I sent rx for meter , strips, lancets to the [facility] pharmacy today for you.       2.  FYI-- please avoid all nsaids --these are advil, aleve , ibuprofen, naproxen type products due to past heart attack. Use Arthritis strength tylenol and otc rubs/patches like icy-hot.       Follow-up via phone call -Monday July 16th  At 10:00 am .     I spent 50 minutes with this patient today.  All changes were made via collaborative practice agreement with Nabeel Loew. A copy of the visit note was provided to the patient's primary care provider.        The patient was sent via [employer] a summary of these recommendations as an after visit summary.     Anand Jenkins Rph.  Medication Therapy Management Provider  [phone]

## 2018-03-28 DIAGNOSIS — I10 HYPERTENSION, BENIGN ESSENTIAL, GOAL BELOW 140/90: ICD-10-CM

## 2018-03-29 RX ORDER — LISINOPRIL 10 MG/1
TABLET ORAL
Qty: 90 TABLET | Refills: 0 | Status: SHIPPED | OUTPATIENT
Start: 2018-03-29 | End: 2018-06-27

## 2018-03-29 NOTE — TELEPHONE ENCOUNTER
Prescription approved per [facility] Refill Protocol.     Pattie Ferraro RN  [facility]

## 2018-03-29 NOTE — TELEPHONE ENCOUNTER
"Requested Prescriptions   Pending Prescriptions Disp Refills     lisinopril (PRINIVIL/ZESTRIL) 10 MG tablet [Pharmacy Med Name: LISINOPRIL 10MG TABS] 90 tablet 0    Last Written Prescription Date:  1/2/18  Last Fill Quantity: 90,  # refills: 0   Last office visit: 9/20/2017 with prescribing provider:  9/20/17   Future Office Visit:     Sig: TAKE ONE TABLET BY MOUTH EVERY DAY    ACE Inhibitors (Including Combos) Protocol Passed    3/28/2018 10:10 AM       Passed - Blood pressure under 140/90 in past 12 months    BP Readings from Last 3 Encounters:   09/20/17 112/67   07/10/17 112/69   12/27/16 109/70                Passed - Recent (12 mo) or future (30 days) visit within the authorizing provider's specialty    Patient had office visit in the last 12 months or has a visit in the next 30 days with authorizing provider or within the authorizing provider's specialty.  See \"Patient Info\" tab in inbasket, or \"Choose Columns\" in Meds & Orders section of the refill encounter.           Passed - Patient is age 18 or older       Passed - Normal serum creatinine on file in past 12 months    Recent Labs   Lab Test 02/08/18   CR  0.79            Passed - Normal serum potassium on file in past 12 months    Recent Labs   Lab Test 02/08/18   POTASSIUM  4.7               "

## 2018-05-29 ENCOUNTER — TELEPHONE (OUTPATIENT)
Dept: PHARMACY | Facility: CLINIC | Age: 63
End: 2018-05-29

## 2018-05-29 NOTE — TELEPHONE ENCOUNTER
5/29/2018    Anand:   After my consultation with you February 19th I stopped taking any NSAIDS for pain and joint discomfort.   In the last 3 months I have had trigger finger surgery in my left hand, carpal tunnel surgery in my right wrist and constant struggle to resolve a lower hamstring strain in my left leg. I also have been experienced an overall decline in my upper body strength. As I told you during our consult I had been taking Advil on a daily basis to relieve minor pain and joint discomfort but have discontinued its use.   My symptoms dramatically increased after I stopped using and did not get any relief from Tylenol. During a single physical therapy session with a PT Therapist at [facility] she told me the using Advil can mask underlying issues. Her solution with my hamstring- was to do daily pt with stretching exercises to resolve and heal. I have tried to do these exercises 3 times per week - but have had little relief. In fact my overall joint pain and hamstring strain pain has prevented me from getting more than 3 hour of continuous deep rem sleep. Less than 5 hours total per night.   After speaking with many of my friends I came across numerous folks that were having major side effects after taking statins. I began to research statins effectiveness and side effects and have come to find out that statins may:   1. Cause significant joint and muscle pain and weakness  2. Increase blood sugar numbers   3. Cause leg cramping.   It appears that satins effectiveness has been overstated by the Pharma's. Preventing only 1-2 heart attacks per 100 in studies   https://articles.[employer].Lifesum/sites/articles/archive/2016/02/10/1-vzvvlzi-blk-you-should-not-take-statins.aspx  My question to you is:   How do I sort this all out? Can you do this? My GP or my cardiologist?   It seems that I'm at greater heart attack risk with not being able to sleep 7+ hours a night than having higher cholesterol numbers that are lowered  with Satins. My quality of life is reduced as I am very stiff and sore that makes it hard to get out of bed and continue my cycle training for RAGBRAI. At this point I'm a little worried that I should go on RAGBRAI this year due the above issues.   Please advise,   Yimi Mcqueen   [phone]      DARSHAN called patient at 6:25 PM today he was not available left message to have him call me back tomorrow Wednesday, May 30 so we can discuss potential options for his aches and pains.    Anand Jenkins [facility].  Medication Therapy Management Provider  [phone]

## 2018-05-30 ENCOUNTER — TELEPHONE (OUTPATIENT)
Dept: PHARMACY | Facility: CLINIC | Age: 63
End: 2018-05-30

## 2018-05-30 ENCOUNTER — FCC EXTENDED DOCUMENTATION (OUTPATIENT)
Dept: PSYCHOLOGY | Facility: CLINIC | Age: 63
End: 2018-05-30

## 2018-05-30 DIAGNOSIS — E11.9 TYPE 2 DIABETES MELLITUS WITHOUT COMPLICATION, WITHOUT LONG-TERM CURRENT USE OF INSULIN (H): Primary | ICD-10-CM

## 2018-05-30 DIAGNOSIS — E55.9 VITAMIN D DEFICIENCY: ICD-10-CM

## 2018-05-30 DIAGNOSIS — E78.5 HYPERLIPIDEMIA WITH TARGET LDL LESS THAN 100: ICD-10-CM

## 2018-05-30 RX ORDER — ROSUVASTATIN CALCIUM 40 MG/1
40 TABLET, COATED ORAL DAILY
Qty: 90 TABLET | Refills: 1 | Status: SHIPPED | OUTPATIENT
Start: 2018-05-30 | End: 2018-08-10 | Stop reason: DRUGHIGH

## 2018-05-30 NOTE — PROGRESS NOTES
Discharge Summary  Multiple Sessions    Client Name: Yimi Mcqueen MRN#: [id number] YOB: 1955      Intake / Discharge Date: 5/30/2018      DSM5 Diagnoses: (Sustained by DSM5 Criteria Listed Above)  Diagnoses: Adjustment Disorders  309.9 (F43.20) Unspecified  Psychosocial & Contextual Factors: Family concerns  WHODAS 2.0 (12 item) Score: 12          Presenting Concern:  Client and wife struggling to parent their adult daughter with addiction hx and criminal background living at their home.      Reason for Discharge:  Goals completed      Disposition at Time of Last Encounter:   Comments:   N/A     Risk Management:   Client denies a history of suicidal ideation, suicide attempts, self-injurious behavior, homicidal ideation, homicidal behavior and and other safety concerns  A safety and risk management plan has not been developed at this time, however client was given the after-hours number / 911 should there be a change in any of these risk factors.      Referred To:  N/A        Rhoda Cao   5/30/2018

## 2018-05-30 NOTE — TELEPHONE ENCOUNTER
5-30-18      5/29/2018     Anand:   After my consultation with you February 19th I stopped taking any NSAIDS for pain and joint discomfort.   In the last 3 months I have had trigger finger surgery in my left hand, carpal tunnel surgery in my right wrist and constant struggle to resolve a lower hamstring strain in my left leg. I also have been experienced an overall decline in my upper body strength. As I told you during our consult I had been taking Advil on a daily basis to relieve minor pain and joint discomfort but have discontinued its use.   My symptoms dramatically increased after I stopped using and did not get any relief from Tylenol. During a single physical therapy session with a PT Therapist at [facility] she told me the using Advil can mask underlying issues. Her solution with my hamstring- was to do daily pt with stretching exercises to resolve and heal. I have tried to do these exercises 3 times per week - but have had little relief. In fact my overall joint pain and hamstring strain pain has prevented me from getting more than 3 hour of continuous deep rem sleep. Less than 5 hours total per night.   After speaking with many of my friends I came across numerous folks that were having major side effects after taking statins. I began to research statins effectiveness and side effects and have come to find out that statins may:   1. Cause significant joint and muscle pain and weakness  2. Increase blood sugar numbers   3. Cause leg cramping.   It appears that satins effectiveness has been overstated by the Pharma's. Preventing only 1-2 heart attacks per 100 in studies   https://articles.M[employer].NextPotential/sites/articles/archive/2016/02/10/1-mgpcalx-mvo-you-should-not-take-statins.aspx  My question to you is:   How do I sort this all out? Can you do this? My GP or my cardiologist?   It seems that I'm at greater heart attack risk with not being able to sleep 7+ hours a night than having higher cholesterol numbers that  are lowered with Satins. My quality of life is reduced as I am very stiff and sore that makes it hard to get out of bed and continue my cycle training for RAGBRAI. At this point I'm a little worried that I should go on RAGBRAI this year due the above issues.   Please advise,   Yimi Mcqueen   [phone]      Plan:      MTM/PhaRM-d4 Called yimi --he feels the statin is causing him leg cramps, muscle aches/pains and was taking 2-3 advil /day to counteract that but he did have past MI 2 years ago and that is why mtm had him stop all nsaids 2-8-18.  He see's dr. Lu at [facility] is his cards md.    He is an avid bike rider -rode 25 miles the other day . He states not dehydrated -takes 1/3-1/2 pickle juice and several bananas /day --all help.     1. FYI--#1 choice for pain relief is tylenol arthritis 650mg - he has 500mg tabs at home--try [id number] tabs at b-fast , lunch , dinner and bedtime -try to get ahead of the pain . Reserve otc aleve 1 bid for severe pain only.     2. FYI--needs updated a1c,lipids, cnp, albumin  and baseline vitamin -D lab--will add those to epic.    3. FYI-- mtm will change his 80mg lipitor to 40mg crestor . Recheck labs in 12 weeks. Also add in daily otc co-q-10 -200mg./day to blunt statin SE's.     Also--patient will see me in 1 month - with his bs meter -- June- 27th at 10;00am .       Anand Jenkins, [facility].  Medication Therapy Management Provider  [phone]  Judy Escobar, Pharm-D4

## 2018-05-31 DIAGNOSIS — E11.9 TYPE 2 DIABETES MELLITUS WITHOUT COMPLICATION, WITHOUT LONG-TERM CURRENT USE OF INSULIN (H): ICD-10-CM

## 2018-05-31 DIAGNOSIS — E78.5 HYPERLIPIDEMIA WITH TARGET LDL LESS THAN 100: ICD-10-CM

## 2018-05-31 DIAGNOSIS — E55.9 VITAMIN D DEFICIENCY: ICD-10-CM

## 2018-05-31 LAB
ALBUMIN SERPL-MCNC: 4.2 G/DL (ref 3.4–5)
ALP SERPL-CCNC: 68 U/L (ref 40–150)
ALT SERPL W P-5'-P-CCNC: 31 U/L (ref 0–70)
ANION GAP SERPL CALCULATED.3IONS-SCNC: 7 MMOL/L (ref 3–14)
AST SERPL W P-5'-P-CCNC: 22 U/L (ref 0–45)
BILIRUB SERPL-MCNC: 1 MG/DL (ref 0.2–1.3)
BUN SERPL-MCNC: 22 MG/DL (ref 7–30)
CALCIUM SERPL-MCNC: 9.5 MG/DL (ref 8.5–10.1)
CHLORIDE SERPL-SCNC: 108 MMOL/L (ref 94–109)
CHOLEST SERPL-MCNC: 103 MG/DL
CO2 SERPL-SCNC: 26 MMOL/L (ref 20–32)
CREAT SERPL-MCNC: 0.81 MG/DL (ref 0.66–1.25)
CREAT UR-MCNC: 73 MG/DL
DEPRECATED CALCIDIOL+CALCIFEROL SERPL-MC: 39 UG/L (ref 20–75)
GFR SERPL CREATININE-BSD FRML MDRD: >90 ML/MIN/1.7M2
GLUCOSE SERPL-MCNC: 113 MG/DL (ref 70–99)
HBA1C MFR BLD: 6.2 % (ref 0–5.6)
HDLC SERPL-MCNC: 36 MG/DL
LDLC SERPL CALC-MCNC: 53 MG/DL
MICROALBUMIN UR-MCNC: 6 MG/L
MICROALBUMIN/CREAT UR: 8.5 MG/G CR (ref 0–17)
NONHDLC SERPL-MCNC: 67 MG/DL
POTASSIUM SERPL-SCNC: 5.1 MMOL/L (ref 3.4–5.3)
PROT SERPL-MCNC: 7.8 G/DL (ref 6.8–8.8)
SODIUM SERPL-SCNC: 141 MMOL/L (ref 133–144)
TRIGL SERPL-MCNC: 71 MG/DL

## 2018-05-31 PROCEDURE — 82306 VITAMIN D 25 HYDROXY: CPT | Performed by: FAMILY MEDICINE

## 2018-05-31 PROCEDURE — 83036 HEMOGLOBIN GLYCOSYLATED A1C: CPT | Performed by: FAMILY MEDICINE

## 2018-05-31 PROCEDURE — 80053 COMPREHEN METABOLIC PANEL: CPT | Performed by: FAMILY MEDICINE

## 2018-05-31 PROCEDURE — 36415 COLL VENOUS BLD VENIPUNCTURE: CPT | Performed by: FAMILY MEDICINE

## 2018-05-31 PROCEDURE — 80061 LIPID PANEL: CPT | Performed by: FAMILY MEDICINE

## 2018-05-31 PROCEDURE — 82043 UR ALBUMIN QUANTITATIVE: CPT | Performed by: FAMILY MEDICINE

## 2018-06-27 ENCOUNTER — OFFICE VISIT (OUTPATIENT)
Dept: PHARMACY | Facility: CLINIC | Age: 63
End: 2018-06-27
Payer: COMMERCIAL

## 2018-06-27 VITALS
SYSTOLIC BLOOD PRESSURE: 122 MMHG | DIASTOLIC BLOOD PRESSURE: 76 MMHG | HEART RATE: 76 BPM | WEIGHT: 240.8 LBS | OXYGEN SATURATION: 98 % | BODY MASS INDEX: 33.77 KG/M2

## 2018-06-27 DIAGNOSIS — I24.9 ACS (ACUTE CORONARY SYNDROME) (H): ICD-10-CM

## 2018-06-27 DIAGNOSIS — E11.9 TYPE 2 DIABETES MELLITUS WITHOUT COMPLICATION, WITHOUT LONG-TERM CURRENT USE OF INSULIN (H): Primary | ICD-10-CM

## 2018-06-27 DIAGNOSIS — I25.728 CORONARY ARTERY DISEASE OF AUTOLOGOUS BYPASS GRAFT WITH STABLE ANGINA PECTORIS (H): ICD-10-CM

## 2018-06-27 DIAGNOSIS — I10 HYPERTENSION, BENIGN ESSENTIAL, GOAL BELOW 140/90: ICD-10-CM

## 2018-06-27 DIAGNOSIS — E78.5 HYPERLIPIDEMIA WITH TARGET LDL LESS THAN 100: ICD-10-CM

## 2018-06-27 PROCEDURE — 99606 MTMS BY PHARM EST 15 MIN: CPT | Performed by: PHARMACIST

## 2018-06-27 PROCEDURE — 99607 MTMS BY PHARM ADDL 15 MIN: CPT | Performed by: PHARMACIST

## 2018-06-27 RX ORDER — NITROGLYCERIN 0.4 MG/1
0.4 TABLET SUBLINGUAL EVERY 5 MIN PRN
Qty: 25 TABLET | Refills: 5 | Status: SHIPPED | OUTPATIENT
Start: 2018-06-27 | End: 2021-06-11

## 2018-06-27 RX ORDER — LISINOPRIL 10 MG/1
10 TABLET ORAL DAILY
Qty: 90 TABLET | Refills: 0 | Status: SHIPPED | OUTPATIENT
Start: 2018-06-27 | End: 2018-09-18

## 2018-06-27 NOTE — TELEPHONE ENCOUNTER
"Requested Prescriptions   Pending Prescriptions Disp Refills     lisinopril (PRINIVIL/ZESTRIL) 10 MG tablet 90 tablet 0    Last Written Prescription Date:  03/29/18  Last Fill Quantity: 90,  # refills: 0   Last office visit: 9/20/2017   Future Office Visit:   Next 5 appointments (look out 90 days)     Jul 02, 2018  4:00 PM CDT   Office Visit with Nabeel Lowe MD   [facility] ([address] [address] [phone]                  Sig: Take 1 tablet (10 mg) by mouth daily    ACE Inhibitors (Including Combos) Protocol Passed    6/27/2018  3:31 PM       Passed - Blood pressure under 140/90 in past 12 months    BP Readings from Last 3 Encounters:   06/27/18 122/76   09/20/17 112/67   07/10/17 112/69            Passed - Recent (12 mo) or future (30 days) visit within the authorizing provider's specialty    Patient had office visit in the last 12 months or has a visit in the next 30 days with authorizing provider or within the authorizing provider's specialty.  See \"Patient Info\" tab in inbasket, or \"Choose Columns\" in Meds & Orders section of the refill encounter.           Passed - Patient is age 18 or older       Passed - Normal serum creatinine on file in past 12 months    Recent Labs   Lab Test  05/31/18   1142   CR  0.81            Passed - Normal serum potassium on file in past 12 months    Recent Labs   Lab Test  05/31/18   1142   POTASSIUM  5.1               "

## 2018-06-27 NOTE — TELEPHONE ENCOUNTER
Prescription approved per [facility] Refill Protocol.    Pattie Ferraro RN  [facility]

## 2018-06-27 NOTE — Clinical Note
Dr. Lowe-saw Yimi today for meds and labs reviewed all looking very good only change today really is ordering him a new fresh bottle of nitro pills and he is to change the pills in his keychain more regularly as ones and there today were crumbly and powdery and most likely ineffective. He has had quite a remarkable change in his health and it is nice to see.  Let me know if any questions or concerns, thank you Anand

## 2018-06-27 NOTE — PROGRESS NOTES
SUBJECTIVE/OBJECTIVE:                                                    Yimi Mcqueen is a 62 year old male coming in for a follow-up (2-19-18) visit for Medication Therapy Management.  He was referred to me from -MT program.     Chief Complaint:  Here for meds/labs review.  Patient states he feels better on a new med regimen plus diet changes.        Personal Healthcare Goals: lose another 10 lbs -20lbs.(230 is his Goal.)   Plan - cards wants him on meditarranean diet   He's a gourmet cook, organic garden , frustrated with nutrition classes --waste of time --what other options do we have for him ?  He's retired now as well.   5:52 PM   Allergies/ADRs: Reviewed in Epic  Tobacco: History of tobacco dependence - quit 16 years ago.  Alcohol: Less than 1 beverage / month  Caffeine: 1 cups/day of coffee  Activity: bike -100 miles /week x 3-5 years. 4,000  Miles/year.  PMH: Reviewed in Epic; DM x 3 years now;  Recent MI sept. 2016, 2 stents 10-10-16    Medication Adherence: issues found and discussed below and sets up own med boxes    Type -2 DM: metformin- ER 500mg tid now(2 in am and 1 pm ) , trulicity 1.5mg/week.     SMBG:                 His cards md wants him to try meditearrean diet --he is on a veggies/ fruits diet , avoiding more red meat -just monthly now. Chicken qod, fish weekly .     Sugary desserts cause him leg cramps.       He bikes a lot daily for exercise now.         ACS:  MI (2016)-- 2 coronary artery drug eleuting stents -now takes clopidogrel 75mg./day (off brillinta due to cost) , also metoprolol er 50mg qday and sl-prn nitro --carries with him on his key chain.  He hasnt changed his nitro pills in key chain --we reviewed/to open his nitric keychain all his pills were crumbly and powdery discussed why he needs new fresh bottle yearly and change those in the keychain quarterly.      Hyperlipidemia: Current therapy includes: rosuvastatin 40mg./day + co-q-10 - 20mg./day and exercise.  Pt reports  still having myalgias in thighs, knees , shoulders .         Hypertension: Current medications include Lisinopril 10mg qam and metoprolol 50mg er tab hs .  Patient does self-monitor BP. Home BP monitoring in range of 120's systolic over 70's diastolic.pulse range 65-68.  Patient reports no current medication side effects.    OA: FYI he still is affected by some aches and pains shoulder joints knee joints thighs back of leg unsure if it is just arthritis or a statin related side effects, we discussed post MI he should not be using any NSAID therapy which he is avoided 99% of the time only will take an Aleve if in a lot of discomfort, notes Tylenol does not do much for him for pain relief.  He is an avid biker will bike 25 miles a week and feels once he gets going on at that really loosens him up and he feels better, we discussed topical lidocaine he cannot remember if he has used that before see plan for details.    Current labs include:  Today's Vitals: /76  Pulse 76  Wt 240 lb 12.8 oz (109.2 kg)  SpO2 98%  BMI 33.77 kg/m2  BP Readings from Last 3 Encounters:   06/27/18 122/76   09/20/17 112/67   07/10/17 112/69     Lab Results   Component Value Date    A1C 6.2 05/31/2018   .  Lab Results   Component Value Date    CHOL 103 05/31/2018     Lab Results   Component Value Date    TRIG 71 05/31/2018     Lab Results   Component Value Date    HDL 36 05/31/2018     Lab Results   Component Value Date    LDL 53 05/31/2018       Liver Function Studies -   Recent Labs   Lab Test  05/31/18   1142   PROTTOTAL  7.8   ALBUMIN  4.2   BILITOTAL  1.0   ALKPHOS  68   AST  22   ALT  31       Lab Results   Component Value Date    UCRR 73 05/31/2018    MICROL 6 05/31/2018    UMALCR 8.50 05/31/2018       Last Basic Metabolic Panel:  Lab Results   Component Value Date     05/31/2018      Lab Results   Component Value Date    POTASSIUM 5.1 05/31/2018     Lab Results   Component Value Date    CHLORIDE 108 05/31/2018     Lab  Results   Component Value Date    BUN 22 05/31/2018     Lab Results   Component Value Date    CR 0.81 05/31/2018     GFR Estimate   Date Value Ref Range Status   05/31/2018 >90 >60 mL/min/1.7m2 Final     Comment:     Non  GFR Calc   02/08/2018 97 >=60 ml/min/1.73m2 Final   01/06/2017 >90  Non  GFR Calc   >60 mL/min/1.7m2 Final     GFR Estimate If Black   Date Value Ref Range Status   05/31/2018 >90 >60 mL/min/1.7m2 Final     Comment:      GFR Calc   02/08/2018 112 >=60 ml/min/1.73m2 Final   01/06/2017 >90   GFR Calc   >60 mL/min/1.7m2 Final       Most Recent Immunizations   Administered Date(s) Administered     Influenza Vaccine IM 3yrs+ 4 Valent IIV4 10/16/2017     Pneumo Conj 13-V (2010&after) 09/25/2015     Pneumococcal 23 valent 09/19/2016     TDAP Vaccine (Adacel) 04/10/2014         ASSESSMENT:                                                       Current medications were reviewed with him today.     Medication Adherence: no  Issues now.       Diabetes: Needs Improvement. Patient is not meeting A1c goal of < 7%. Self monitoring of blood glucose is not at goal of fasting  mg/dL and post prandial < 150 mg/dL. Pt would benefit from minimum SMBG: Check blood sugars fasting, and occasionally 2 hours after starting a meal. FYI-- pt.. Wants bs meter with less $$ test strips --mtm will order him a free glucocard vital + affordable supplies --see plan for details.   Metformin :  stay on the same dose.  GLP-1 agonist (Trulcity) :  Pt. To obtain rx form [facility] --we discussed start with low dose first month , then increase dose in month -2 if well tolerated . MTm educated patient about potential se's.   Increased exercise--keep riding bike !  Updated Hemoglobin A1c-high 7's --unsure of exact result.   Weight loss recommended--thru low carb diet and added glp-1 med.      ACS: Needs improvement ; fyi-- long discussion about nsaids (other than  daily asa) and MI risk . He has high cv risk so mtm feels avoid .  We discussed he carries sublingual nitro on his key chain but we opened them up and they crumbled he needs new bottle yearly and needs to change those and keychain every 3 months will order him new Rx to pharmacy today.    Hyperlipidemia: stable. Pt is on high intensity statin which is indicated based on 2013 ACC/AHA guidelines for lipid management.  .  FYI still having some myalgias unsure if it is statin arthritis related?  We discussed he should increase and use high-dose co-Q10 per day 200 400 mg a day now for the next 6 months to see if this improves at all, if not he can contact me this fall we will consider a dose reduction of rosuvastatin if possible?    Hypertension: Stable. Patient is meeting BP goal of < 140/90mmHg.    Osteoarthritis: Discussed some of his joint pain may be statin related all others may be arthritis related-he will take more co-Q10 to see if statin related pain improves, he will try over-the-counter Aspercreme brand of 4% lidocaine with rub applicator applied to affected muscle aches as needed-see plan for details.    PLAN:                                                        1. FYI--all labs look great great --stay on same medications - except these two changes-- increase your co-q-10 daily dose to 200-400mg./day . If your still having muscle aches/pains--try some OTC--aspercreme lidocaine 4% rub.     Also- please change and get a fresh bottle of nitro yearly and also change every 3 months --fresh nitro tabs in your key chain.         Next MT visit:  See me  Wednesday January 2nd-2019 at 9:30am.     I spent 50 minutes with this patient today.  All changes were made via collaborative practice agreement with Nabeel Lowe. A copy of the visit note was provided to the patient's primary care provider.        The patient was sent via [employer] a summary of these recommendations as an after visit summary.     Anand  William Jenkins.  Medication Therapy Management Provider  [phone]

## 2018-06-27 NOTE — PATIENT INSTRUCTIONS
Recommendations from today's MTM visit:                                                        1. FYI--all labs look great great --stay on same medications - except these two changes-- increase your co-q-10 daily dose to 200-400mg./day . If your still having muscle aches/pains--try some OTC--aspercreme lidocaine 4% rub.     Also- please change and get a fresh bottle of nitro yearly and also change every 3 months --fresh nitro tabs in your key chain.         Next MTM visit:  See me in Wednesday January 2nd at 9:30am.     To schedule another MTM appointment, please call the clinic directly or you may call the MTM scheduling line at [phone] or toll-free at [phone].     My Clinical Pharmacist's contact information:                                                      It was a pleasure seeing you today!  Please feel free to contact me with any questions or concerns you have.      Anand Jenkins [facility].  Medication Therapy Management Provider  [phone]      You may receive a survey about the MTM services you received.  I would appreciate your feedback to help me serve you better in the future. Please fill it out and return it when you can. Your comments will be anonymous.

## 2018-06-27 NOTE — MR AVS SNAPSHOT
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 FYI--all labs look great great --stay on same medications - except these two changes-- increase your co-q-10 daily dose to 200-400mg./day . If your still having muscle aches/pains--try some OTC--aspercreme lidocaine 4% rub.     Also- please change and get a fresh bottle of nitro yearly and also change every 3 months --fresh nitro tabs in your key chain.         Next MTM visit:  See me in Wednesday January 2nd at 9:30am.     To schedule another MTM appointment, please call the clinic directly or you may call the MTM scheduling line at [phone] or toll-free at [phone].     My Clinical Pharmacist's contact information:                                                      It was a pleasure seeing you today!  Please feel free to contact me with any questions or concerns you have.      Anand Jenkins Rph.  Medication Therapy Management Provider  [phone]      You may receive a survey about the MT services you received.  I would appreciate your feedback to help me serve you better in the future. Please fill it out and return it when you can. Your comments will be anonymous.                    Follow-ups after your visit        Your next 10 appointments already scheduled     Jul 02, 2018  4:00 PM CDT   Office Visit with Nabeel Lowe MD   [facility] ([facility])    [address] [place] [address] [phone]            Bring a current list of meds and any records pertaining to this visit. For Physicals, please bring immunization records and any forms needing to be filled out. Please arrive 10 minutes early to complete paperwork.            Jul 16, 2018 10:00 AM CDT   TELEMEDICINE with Anand Jenkins RPH   [facility] ([facility])    [address]  [facility] [id number] [phone]           Note: this is not an onsite visit; there is no need to come to the facility.            Jan 02, 2019  9:30 AM CST   SHORT with Anand Jenkins RPH   [facility] ([facility])    [address]  [place] [address] [phone]              Who to contact     If you have questions or need follow up information about today's clinic visit or your schedule please contact [facility] directly at [phone].  Normal or non-critical lab and imaging results will be communicated to you by Dextrhart, letter or phone within 4 business days after the clinic has received the results. If you do not hear from us within 7 days, please contact the clinic through Dextrhart or phone. If you have a critical or abnormal lab result, we will notify you by phone as soon as possible.  Submit refill requests through [employer] or call your pharmacy and they will forward the refill request to us. Please allow 3 business days for your refill to be completed.          Additional Information About Your Visit        [employer] Information     [employer] gives you secure access to your electronic health record. If you see a primary care provider, you can also send messages to your care team and make appointments. If you have questions, please call your primary care clinic.  If you do not have a primary care provider, please call [phone] and they will assist you.        Care EveryWhere ID     This is your Care EveryWhere ID. This could be used by  other organizations to access your [facility] medical records  [id number]        Your Vitals Were     Pulse Pulse Oximetry BMI (Body Mass Index)             76 98% 33.77 kg/m2          Blood Pressure from Last 3 Encounters:   06/27/18 122/76   09/20/17 112/67   07/10/17 112/69    Weight from Last 3 Encounters:   06/27/18 240 lb 12.8 oz (109.2 kg)   09/20/17 258 lb (117 kg)   07/10/17 257 lb 12.8 oz (116.9 kg)              Today, you had the following     No orders found for display         Today's Medication Changes          These changes are accurate as of 6/27/18 10:55 AM.  If you have any questions, ask your nurse or doctor.               These medicines have changed or have updated prescriptions.        Dose/Directions    ACCU-CHEK TIERNEY PLUS test strip   This may have changed:  Another medication with the same name was removed. Continue taking this medication, and follow the directions you see here.   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)        Dose:  0.4 mg   Place 1 tablet (0.4 mg) under the tongue every 5 minutes as needed for chest pain   Quantity:  25 tablet   Refills:  5            Where to get your medicines      These medications were sent to [facility] - [place], [place] - [address]  [address], [place] [address]     Phone:  [phone]     nitroGLYcerin 0.4 MG sublingual tablet                Primary Care Provider Office Phone # Fax #    Nabeel Lowe [phone]       [address] [facility] [address]  [facility] [address]        Equal Access to Services     KAYLIN ENRIQUEZ AH: Fabian Romo, camilo francisco, avtar roque  molly bradleyedson tomlin'aan ah. So [facility] [phone].    ATENCIÓN: Si chrisla jimbo, tiene a layne disposición servicios gratuitos de asistencia lingüística. Les luke [phone].    We comply with applicable federal civil rights laws and [place] laws. We do not discriminate on the basis of race, color, national origin, age, disability, sex, sexual orientation, or gender identity.            Thank you!     Thank you for choosing [facility]  for your care. Our goal is always to provide you with excellent care. Hearing back from our patients is one way we can continue to improve our services. Please take a few minutes to complete the written survey that you may receive in the mail after your visit with us. Thank you!             Your Updated Medication List - Protect others around you: Learn how to safely use, store and throw away your medicines at www.disposemymeds.org.          This list is accurate as of 6/27/18 10:55 AM.  Always use your most recent med list.                   Brand Name Dispense Instructions for use Diagnosis    ACCU-CHEK TIERNEY PLUS test strip   Generic drug:  blood glucose monitoring      1 strip by In Vitro route 2 times daily        aspirin 81 MG tablet      Take  by mouth daily.        calcium-vitamin D 600-400 MG-UNIT per tablet    CALTRATE     Take 1 tablet by mouth daily        CO Q 10 PO      Take 40 mg by mouth daily        lisinopril 10 MG tablet    PRINIVIL/ZESTRIL    90 tablet    TAKE ONE TABLET BY MOUTH EVERY DAY    Hypertension, benign essential, goal below 140/90       metFORMIN 500 MG 24 hr tablet    GLUCOPHAGE-XR     Take 500 mg by mouth 3 times daily (with meals)        metoprolol succinate 50 MG 24 hr tablet    TOPROL-XL     Take 50 mg by mouth daily        MULTIVITAMINS PO      Take  by mouth daily.        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

## 2018-07-02 ENCOUNTER — OFFICE VISIT (OUTPATIENT)
Dept: FAMILY MEDICINE | Facility: CLINIC | Age: 63
End: 2018-07-02
Payer: COMMERCIAL

## 2018-07-02 VITALS
RESPIRATION RATE: 16 BRPM | OXYGEN SATURATION: 96 % | BODY MASS INDEX: 33.8 KG/M2 | HEART RATE: 72 BPM | WEIGHT: 241 LBS | TEMPERATURE: 98.5 F | SYSTOLIC BLOOD PRESSURE: 110 MMHG | DIASTOLIC BLOOD PRESSURE: 76 MMHG

## 2018-07-02 DIAGNOSIS — I25.2 OLD MYOCARDIAL INFARCTION: Primary | ICD-10-CM

## 2018-07-02 DIAGNOSIS — E78.5 HYPERLIPIDEMIA WITH TARGET LDL LESS THAN 100: ICD-10-CM

## 2018-07-02 PROCEDURE — 93000 ELECTROCARDIOGRAM COMPLETE: CPT | Performed by: FAMILY MEDICINE

## 2018-07-02 PROCEDURE — 99214 OFFICE O/P EST MOD 30 MIN: CPT | Performed by: FAMILY MEDICINE

## 2018-07-02 NOTE — MR AVS SNAPSHOT
After Visit Summary   7/2/2018    Yimi Mcqueen    MRN: [id number]           Patient Information     Date Of Birth          1955        Visit Information        Provider Department      7/2/2018 4:00 PM Nabeel Lowe MD [facility]        Today's Diagnoses     Old myocardial infarction    -  1    Hyperlipidemia with target LDL less than 100          Care Instructions    Continue to monitor hydration while exercising.  Leave a message for cardiologist asking if it is ok to cease statin while on 500 mile bike ride.           Follow-ups after your visit        Your next 10 appointments already scheduled     Jul 16, 2018 10:00 AM CDT   [facility]    [address] [facility] [address] [phone]           Note: this is not an onsite visit; there is no need to come to the facility.            Jan 02, 2019  9:30 AM CST   SHORT with Anand Jenkins RPH   [facility] ([facility])    [address] [place] [address] [phone]              Who to contact     If you have questions or need follow up information about today's clinic visit or your schedule please contact [facility] directly at [phone].  Normal or non-critical lab and imaging results will be communicated to you by MyChart, letter or phone within 4 business days after the clinic has received the results. If you do not hear from us within 7 days, please contact the clinic through MyChart or phone. If you have a critical or abnormal lab result, we will notify you by phone as soon as possible.  Submit refill requests through [employer] or call your pharmacy and they will forward the refill request to us. Please allow 3 business days for your refill to be completed.          Additional Information About Your Visit        [facility]  Information     [email or web address] gives you secure access to your electronic health record. If you see a primary care provider, you can also send messages to your care team and make appointments. If you have questions, please call your primary care clinic.  If you do not have a primary care provider, please call [phone] and they will assist you.        Care EveryWhere ID     This is your Care EveryWhere ID. This could be used by other organizations to access your [facility] medical records  [id number]        Your Vitals Were     Pulse Temperature Respirations Pulse Oximetry BMI (Body Mass Index)       72 98.5  F (36.9  C) (Oral) 16 96% 33.8 kg/m2        Blood Pressure from Last 3 Encounters:   07/02/18 110/76   06/27/18 122/76   09/20/17 112/67    Weight from Last 3 Encounters:   07/02/18 241 lb (109.3 kg)   06/27/18 240 lb 12.8 oz (109.2 kg)   09/20/17 258 lb (117 kg)              We Performed the Following     EKG 12-lead complete w/read - Clinics        Primary Care Provider Office Phone # Fax #    Nabeel Lowe [phone]       [address] [facility] [address]  [facility] [address]        Equal Access to Services     KAYLIN ENRIQUEZ : Hadii aad ku hadasho Soomaali, waaxda luqadaha, qaybta kaalmada adeegyada, waxay molly haygavinn анна zhou . So [facility] [phone].    ATENCIÓN: Si habla español, tiene a layne disposición servicios gratuitos de asistencia lingüística. Llame al [phone].    We comply with applicable federal civil rights laws and [place] laws. We do not discriminate on the basis of race, color, national origin, age, disability, sex, sexual orientation, or gender identity.            Thank you!     Thank you for choosing [facility]  for your care. Our goal is always to provide you with excellent care. Hearing back from our patients is one way we can continue to improve our services. Please take a few minutes to complete the written survey that you may receive in  the mail after your visit with us. Thank you!             Your Updated Medication List - Protect others around you: Learn how to safely use, store and throw away your medicines at www.disposemymeds.org.          This list is accurate as of 7/2/18 11:59 PM.  Always use your most recent med list.                   Brand Name Dispense Instructions for use Diagnosis    ACCU-CHEK TIERNEY PLUS test strip   Generic drug:  blood glucose monitoring      1 strip by In Vitro route 2 times daily        aspirin 81 MG tablet      Take  by mouth daily.        calcium-vitamin D 600-400 MG-UNIT per tablet    CALTRATE     Take 1 tablet by mouth daily        CO Q 10 PO      Take 40 mg by mouth daily        lisinopril 10 MG tablet    PRINIVIL/ZESTRIL    90 tablet    Take 1 tablet (10 mg) by mouth daily    Hypertension, benign essential, goal below 140/90       metFORMIN 500 MG 24 hr tablet    GLUCOPHAGE-XR     Take 500 mg by mouth 3 times daily (with meals)        metoprolol succinate 50 MG 24 hr tablet    TOPROL-XL     Take 50 mg by mouth daily        MULTIVITAMINS PO      Take  by mouth daily.        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

## 2018-07-02 NOTE — PATIENT INSTRUCTIONS
Continue to monitor hydration while exercising.  Leave a message for cardiologist asking if it is ok to cease statin while on 500 mile bike ride.

## 2018-07-02 NOTE — PROGRESS NOTES
"  SUBJECTIVE:   Yimi Mcqueen is a 62 year old male who presents to clinic today for the following health issues:      Left leg Pain and pain all over  Thinks that it is caused by his statin. Will not see his endocrinologist any more because he feels that he is not receiving proper treatment. Has sen PT who prescribed him stretching exercises that he felt made his symptoms worse. Also experiencing left shoulder aches and left hamstring cramping. Has heard that some people do not believe in statins and wonders if he needs to continue to take his. Wonders if maybe he is developing arthritis.     Onset: 7    Description:   Location: Joints and muscles all over worse on left leg  Character: Gnawing and Cramping    Intensity: moderate    Progression of Symptoms: same    Accompanying Signs & Symptoms:  Other symptoms: none    History:   Previous similar pain: no       Precipitating factors:   Trauma or overuse: no     Alleviating factors:  Improved by: NSAID- Ibuprofen    Wake up at 2 AM and stays up with pain   Therapies Tried and outcome: Ibuprofen       EKG  Patients wife would like Yimi to receive an EKG      Problem list and histories reviewed & adjusted, as indicated.  Additional history: as documented    Patient Active Problem List   Diagnosis     Advance Care Planning     Numerous moles     BMI 38.0-38.9,adult     Impacted cerumen     Hyperlipidemia with target LDL less than 100     Venous (peripheral) insufficiency     ED (erectile dysfunction)     CKD (chronic kidney disease) stage 2, GFR 60-89 ml/min     Hypertension, benign essential, goal below 140/90     Old myocardial infarction     Type 2 diabetes mellitus without complication, without long-term current use of insulin (H)     Past Surgical History:   Procedure Laterality Date     C MUSCLE-SKIN FLAP,LEG  \"       \"     C OPEN RX ANKLE DISLOCATN+FIXATN  ~'05    infected     COLONOSCOPY  05/12    repeat 5 yrs       Social History   Substance Use Topics "     Smoking status: Former Smoker     Packs/day: 1.50     Years: 20.00     Quit date: 1/1/2006     Smokeless tobacco: Never Used     Alcohol use Yes      Comment: Occ.      Family History   Problem Relation Age of Onset     Cancer - colorectal Mother      Colon Cancer Mother      HEART DISEASE Father          Current Outpatient Prescriptions   Medication Sig Dispense Refill     aspirin 81 MG tablet Take  by mouth daily.       blood glucose monitoring (ACCU-CHEK TIERNEY PLUS) test strip 1 strip by In Vitro route 2 times daily       calcium-vitamin D (CALTRATE) 600-400 MG-UNIT per tablet Take 1 tablet by mouth daily        Clopidogrel Bisulfate (PLAVIX PO) Take 75 mg by mouth daily        Coenzyme Q10 (CO Q 10 PO) Take 40 mg by mouth daily       dulaglutide (TRULICITY) 1.5 MG/0.5ML pen Inject 1.5 mg Subcutaneous every 7 days       lisinopril (PRINIVIL/ZESTRIL) 10 MG tablet Take 1 tablet (10 mg) by mouth daily 90 tablet 0     metFORMIN (GLUCOPHAGE-XR) 500 MG 24 hr tablet Take 500 mg by mouth 3 times daily (with meals)       metoprolol (TOPROL-XL) 50 MG 24 hr tablet Take 50 mg by mouth daily       Multiple Vitamin (MULTIVITAMINS PO) Take  by mouth daily.       rosuvastatin (CRESTOR) 40 MG tablet Take 1 tablet (40 mg) by mouth daily 90 tablet 1     nitroGLYcerin (NITROSTAT) 0.4 MG sublingual tablet Place 1 tablet (0.4 mg) under the tongue every 5 minutes as needed for chest pain (Patient not taking: Reported on 7/2/2018) 25 tablet 5     No Known Allergies  Recent Labs   Lab Test  05/31/18   1142 02/08/18 01/06/17   1007  09/19/16   1445  09/25/15   1118   04/10/14   1453   A1C  6.2*  7.9*  7.3*  7.1*  7.3*   < >  8.4*   LDL  53   --   63   --   145*   < >  161*   HDL  36*   --   38*   --   39*   < >  32*   TRIG  71   --   165*   --   265*   < >  161*   ALT  31   --   46   --   40   --   48   CR  0.81  0.79  0.70   --   0.92   < >  0.86   GFRESTIMATED  >90  97  >90  Non  GFR Calc     --   85   < >  >90    GFRESTBLACK  >90  112  >90  African American GFR Calc     --   >90   GFR Calc     < >  >90   POTASSIUM  5.1  4.7  4.9   --   4.8   < >  4.1   TSH   --    --    --   0.50   --    --   0.52    < > = values in this interval not displayed.      BP Readings from Last 3 Encounters:   07/02/18 110/76   06/27/18 122/76   09/20/17 112/67    Wt Readings from Last 3 Encounters:   07/02/18 109.3 kg (241 lb)   06/27/18 109.2 kg (240 lb 12.8 oz)   09/20/17 117 kg (258 lb)               Labs reviewed in EPIC    Reviewed and updated as needed this visit by clinical staff       Reviewed and updated as needed this visit by Provider         ROS:  CONSTITUTIONAL: POSITIVE for trouble sleeping   INTEGUMENTARY/SKIN: NEGATIVE for worrisome rashes, moles or lesions  EYES: NEGATIVE for vision changes or irritation  ENT/MOUTH: NEGATIVE for ear, mouth and throat problems  RESP: NEGATIVE for significant cough or SOB  CV: NEGATIVE for chest pain, palpitations or peripheral edema  : POSITIVE for night time urination.   MUSCULOSKELETAL: POSITIVE for left quad and left shoulder aches and pains.   PSYCHIATRIC: NEGATIVE for changes in mood or affect    This document serves as a record of the services and decisions personally performed and made by Nabeel Lowe MD. It was created on her behalf by Wild Guallpa, a trained medical scribe. The creation of this document is based on the provider's statements to the medical scribe.   Wild Guallpa, 4:24 PM, July 2, 2018    OBJECTIVE:     /76  Pulse 72  Temp 98.5  F (36.9  C) (Oral)  Resp 16  Wt 109.3 kg (241 lb)  SpO2 96%  BMI 33.8 kg/m2  Body mass index is 33.8 kg/(m^2).  GENERAL: healthy, alert and no distress  EYES: Eyes grossly normal to inspection, PERRL and conjunctivae and sclerae normal  HENT: ear canals and TM's normal, nose and mouth without ulcers or lesions  NECK: no adenopathy, no asymmetry, masses, or scars and thyroid normal to palpation  RESP: lungs clear to  auscultation - no rales, rhonchi or wheezes  CV: regular rate and rhythm, normal S1 S2, no S3 or S4, no murmur, click or rub, no peripheral edema and peripheral pulses strong  MS: large scar noted on left lower leg above ankle, no edema  SKIN: no suspicious lesions or rashes  PSYCH: mentation appears normal, affect normal/bright    ASSESSMENT/PLAN:   (I25.2) Old myocardial infarction  (primary encounter diagnosis)  Comment: Discussed proper response to a cardiovascular event. Discussed the pros and cons to having a stent placed.   Plan: CK total, AST, ALT            (E78.5) Hyperlipidemia with target LDL less than 100  Comment: Follow up with cardiologist to see how to handle statin while on 500 mile bike ride.   LDL Cholesterol Calculated   Date Value Ref Range Status   05/31/2018 53 <100 mg/dL Final     Comment:     Desirable:       <100 mg/dl     Plan: CK total, AST, ALT            Nabeel Lowe MD  [facility]      The information in this document, created by a medical scribe for me, accurately reflects the services I personally performed and the decisions made by me. I have reviewed and approved this document for accuracy.  Dr. Nabeel Lowe, 4:25 PM, July 2, 2018

## 2018-07-25 ENCOUNTER — TELEPHONE (OUTPATIENT)
Dept: FAMILY MEDICINE | Facility: CLINIC | Age: 63
End: 2018-07-25

## 2018-07-25 DIAGNOSIS — H10.30 ACUTE CONJUNCTIVITIS, UNSPECIFIED ACUTE CONJUNCTIVITIS TYPE, UNSPECIFIED LATERALITY: Primary | ICD-10-CM

## 2018-07-25 RX ORDER — POLYMYXIN B SULFATE AND TRIMETHOPRIM 1; 10000 MG/ML; [USP'U]/ML
1 SOLUTION OPHTHALMIC
Qty: 1 BOTTLE | Refills: 0 | Status: SHIPPED | OUTPATIENT
Start: 2018-07-25 | End: 2018-08-01

## 2018-07-25 NOTE — TELEPHONE ENCOUNTER
Reason for call:  Other   Patient called regarding (reason for call): call back  Additional comments: The patients wife called and stated he was on a bike tour of [place] and called her saying he has pink eye. She was wondering if Dr. Lowe could send a prescription for treating pink eye to a pharmacy near where he is right now so he can get started on getting it treated. She would like a call back to discuss this.    Phone number to reach patient:  Home number on file [phone] (home)    Best Time: Any    Can we leave a detailed message on this number?  YES

## 2018-07-25 NOTE — TELEPHONE ENCOUNTER
Called patient. Mailbox full, unable to leave VM. Called wife, Jacqueline. Notified of rx signed.    Pattie Ferraro RN  [facility]

## 2018-07-25 NOTE — TELEPHONE ENCOUNTER
Dr. Lowe,    Received call from wife, Jacqueline, pt is on bike tour has symptoms of green eye drainage, matte vision, no pain, has redness/conjunctivitis symptoms.    Cued for Polytrim eye drops. Pt's requesting to be sent to [facility] [place].     Please review/sign or advise if okay.    Pt waiting to continue tour until able to  prescription. Per wife please call [phone], pt to notify.    Pattie Ferraro RN  [facility]

## 2018-08-03 ENCOUNTER — OFFICE VISIT (OUTPATIENT)
Dept: FAMILY MEDICINE | Facility: CLINIC | Age: 63
End: 2018-08-03
Payer: COMMERCIAL

## 2018-08-03 VITALS
SYSTOLIC BLOOD PRESSURE: 108 MMHG | TEMPERATURE: 97.7 F | DIASTOLIC BLOOD PRESSURE: 66 MMHG | HEART RATE: 74 BPM | OXYGEN SATURATION: 98 % | WEIGHT: 236.6 LBS | BODY MASS INDEX: 33.19 KG/M2

## 2018-08-03 DIAGNOSIS — Z87.891 FORMER HEAVY TOBACCO SMOKER: ICD-10-CM

## 2018-08-03 DIAGNOSIS — J44.1 COPD EXACERBATION (H): Primary | ICD-10-CM

## 2018-08-03 DIAGNOSIS — J06.9 UPPER RESPIRATORY TRACT INFECTION, UNSPECIFIED TYPE: ICD-10-CM

## 2018-08-03 PROCEDURE — 99214 OFFICE O/P EST MOD 30 MIN: CPT | Performed by: INTERNAL MEDICINE

## 2018-08-03 RX ORDER — PREDNISONE 20 MG/1
20 TABLET ORAL 2 TIMES DAILY
Qty: 10 TABLET | Refills: 1 | Status: SHIPPED | OUTPATIENT
Start: 2018-08-03 | End: 2019-07-10

## 2018-08-03 RX ORDER — ALBUTEROL SULFATE 90 UG/1
2 AEROSOL, METERED RESPIRATORY (INHALATION) EVERY 6 HOURS PRN
Qty: 1 INHALER | Refills: 1 | Status: SHIPPED | OUTPATIENT
Start: 2018-08-03 | End: 2020-03-24

## 2018-08-03 RX ORDER — AZITHROMYCIN 250 MG/1
TABLET, FILM COATED ORAL
Qty: 6 TABLET | Refills: 1 | Status: SHIPPED | OUTPATIENT
Start: 2018-08-03 | End: 2019-07-10

## 2018-08-03 NOTE — PROGRESS NOTES
SUBJECTIVE:   Yimi Mcqueen is a 62 year old male who presents to clinic today for the following health issues:      RESPIRATORY SYMPTOMS      Duration: 2 weeks    Description  nasal congestion, rhinorrhea, sore throat, cough, ear pain bilateral, headache and fatigue/malaise    Severity: moderate    Accompanying signs and symptoms: None    History (predisposing factors):  none    Precipitating or alleviating factors: None    Therapies tried and outcome:  Throat lozenges    Current Medications:     Current Outpatient Prescriptions   Medication Sig Dispense Refill     aspirin 81 MG tablet Take  by mouth daily.       blood glucose monitoring (ACCU-CHEK TIERNEY PLUS) test strip 1 strip by In Vitro route 2 times daily       calcium-vitamin D (CALTRATE) 600-400 MG-UNIT per tablet Take 1 tablet by mouth daily        Clopidogrel Bisulfate (PLAVIX PO) Take 75 mg by mouth daily        Coenzyme Q10 (CO Q 10 PO) Take 40 mg by mouth daily       dulaglutide (TRULICITY) 1.5 MG/0.5ML pen Inject 1.5 mg Subcutaneous every 7 days       lisinopril (PRINIVIL/ZESTRIL) 10 MG tablet Take 1 tablet (10 mg) by mouth daily 90 tablet 0     metFORMIN (GLUCOPHAGE-XR) 500 MG 24 hr tablet Take 500 mg by mouth 3 times daily (with meals)       metoprolol (TOPROL-XL) 50 MG 24 hr tablet Take 50 mg by mouth daily       Multiple Vitamin (MULTIVITAMINS PO) Take  by mouth daily.       nitroGLYcerin (NITROSTAT) 0.4 MG sublingual tablet Place 1 tablet (0.4 mg) under the tongue every 5 minutes as needed for chest pain 25 tablet 5     rosuvastatin (CRESTOR) 40 MG tablet Take 1 tablet (40 mg) by mouth daily 90 tablet 1         Allergies:    No Known Allergies         Past Medical History:     Past Medical History:   Diagnosis Date     CKD (chronic kidney disease) stage 2, GFR 60-89 ml/min 10/9/2015     Diabetes (H)      Heart disease      Hypertension      Open left ankle fracture ~'05    infected         Past Surgical History:     Past Surgical History:  "  Procedure Laterality Date     C MUSCLE-SKIN FLAP,LEG  \"       \"     C OPEN RX ANKLE DISLOCATN+FIXATN  ~'05    infected     COLONOSCOPY  05/12    repeat 5 yrs         Family Medical History:     Family History   Problem Relation Age of Onset     Cancer - colorectal Mother      Colon Cancer Mother      HEART DISEASE Father          Social History:     Social History     Social History     Marital status:      Spouse name: N/A     Number of children: N/A     Years of education: N/A     Occupational History     Not on file.     Social History Main Topics     Smoking status: Former Smoker     Packs/day: 1.50     Years: 20.00     Quit date: 1/1/2006     Smokeless tobacco: Never Used     Alcohol use Yes      Comment: Occ.      Drug use: No     Sexual activity: No     Other Topics Concern     Parent/Sibling W/ Cabg, Mi Or Angioplasty Before 65f 55m? No     Social History Narrative           Review of System:     Constitutional: Negative for fever or chills  Skin: Negative for rashes  Ears/Nose/Throat: Positive for nasal congestion, sore throat  Respiratory:Positive for cough  Cardiovascular: Negative for chest pain  Gastrointestinal: Negative for nausea, vomiting  Genitourinary: Negative for dysuria, hematuria  Musculoskeletal: Negative for myalgias  Neurologic: Negative for headaches  Psychiatric: Negative for depression, anxiety  Hematologic/Lymphatic/Immunologic: Negative  Endocrine: Positive for Type 2 Diabetes   Behavioral: Negative for tobacco use currently. Former tobacco smoker, 1 pack of cigarettes per day for over 20 years quit several years ago.      Physical Exam:   /66  Pulse 74  Temp 97.7  F (36.5  C) (Oral)  Wt 236 lb 9.6 oz (107.3 kg)  SpO2 98%  BMI 33.19 kg/m2    GENERAL: alert and no distress, afebrile at 97.7 degrees  EYES: eyes grossly normal to inspection, and conjunctivae and sclerae normal  HENT: Normocephalic atraumatic. Nose and mouth without ulcers or lesions, nasal congestion " present, posterior pharyngeal erythema noted  NECK: supple  RESP: mild wheezing symptoms present bilateral lungs  CV: regular rate and rhythm, normal S1 S2  LYMPH: no peripheral edema   ABDOMEN: nondistended  MS: no gross musculoskeletal defects noted  SKIN: no suspicious lesions or rashes  NEURO: Alert & Oriented x 3.   PSYCH: mentation appears normal, affect normal        Diagnostic Test Results:     Diagnostic Test Results:  Results for orders placed or performed in visit on 05/31/18   Hemoglobin A1c   Result Value Ref Range    Hemoglobin A1C 6.2 (H) 0 - 5.6 %   Vitamin D Deficiency   Result Value Ref Range    Vitamin D Deficiency screening 39 20 - 75 ug/L   Lipid panel reflex to direct LDL Fasting   Result Value Ref Range    Cholesterol 103 <200 mg/dL    Triglycerides 71 <150 mg/dL    HDL Cholesterol 36 (L) >39 mg/dL    LDL Cholesterol Calculated 53 <100 mg/dL    Non HDL Cholesterol 67 <130 mg/dL   Comprehensive metabolic panel   Result Value Ref Range    Sodium 141 133 - 144 mmol/L    Potassium 5.1 3.4 - 5.3 mmol/L    Chloride 108 94 - 109 mmol/L    Carbon Dioxide 26 20 - 32 mmol/L    Anion Gap 7 3 - 14 mmol/L    Glucose 113 (H) 70 - 99 mg/dL    Urea Nitrogen 22 7 - 30 mg/dL    Creatinine 0.81 0.66 - 1.25 mg/dL    GFR Estimate >90 >60 mL/min/1.7m2    GFR Estimate If Black >90 >60 mL/min/1.7m2    Calcium 9.5 8.5 - 10.1 mg/dL    Bilirubin Total 1.0 0.2 - 1.3 mg/dL    Albumin 4.2 3.4 - 5.0 g/dL    Protein Total 7.8 6.8 - 8.8 g/dL    Alkaline Phosphatase 68 40 - 150 U/L    ALT 31 0 - 70 U/L    AST 22 0 - 45 U/L   Albumin Random Urine Quantitative with Creat Ratio   Result Value Ref Range    Creatinine Urine 73 mg/dL    Albumin Urine mg/L 6 mg/L    Albumin Urine mg/g Cr 8.50 0 - 17 mg/g Cr       ASSESSMENT/PLAN:       (J44.1) COPD exacerbation (H)  (primary encounter diagnosis)  (Z87.891) Former heavy tobacco smoker  (J06.9) Upper respiratory tract infection, unspecified type  Comment: recent acute COPD exacerbation  symptoms following recnet URI episode in the setting of former tobacco smoker, 1 pack of cigarettes per day for over 20 years quit several years ago.  Plan: I have prescribed amoxicillin-clavulanate (AUGMENTIN) 875-125 MG per tablet, predniSONE (DELTASONE) 20 MG tablet, albuterol (PROAIR HFA/PROVENTIL HFA/VENTOLIN HFA) 108 (90 Base) MCG/ACT Inhaler for treatment. Patient is also advised that the prednisone therapy will cause a short term elevation in his blood glucose levels with regards to diabetes, although this is going to be only temporary for the duration of the prednisone therapy.        Follow Up Plan:     Patient is instructed to return to Internal Medicine clinic for follow-up visit on Monday 8/6/2018 for further evaluation. In the meantime, the patient is also advised to go to the nearest ER if he develops any new fever, chills or chest pains symptoms over the weekend. The patient endorses understanding of and agreement to the above recommendation.        Kayce Desai MD  Internal Medicine  [facility]

## 2018-08-03 NOTE — MR AVS SNAPSHOT
After Visit Summary   8/3/2018    Yimi Mcqueen    MRN: [id number]           Patient Information     Date Of Birth          1955        Visit Information        Provider Department      8/3/2018 4:00 PM Kayce Desai MD [facility]        Today's Diagnoses     COPD exacerbation (H)    -  1    Former heavy tobacco smoker        Upper respiratory tract infection, unspecified type           Follow-ups after your visit        Your next 10 appointments already scheduled     Aug 10, 2018  3:00 PM CDT   Office Visit with Nabeel Lowe MD   [facility]    [address] [place] [address] [phone]           Bring a current list of meds and any records pertaining to this visit. For Physicals, please bring immunization records and any forms needing to be filled out. Please arrive 10 minutes early to complete paperwork.            Jan 02, 2019  9:30 AM CST   SHORT with Anand Jenkins RPH   [facility] ([facility])    [address] [place] [address] [phone]              Who to contact     If you have questions or need follow up information about today's clinic visit or your schedule please contact [facility] directly at [phone].  Normal or non-critical lab and imaging results will be communicated to you by MyChart, letter or phone within 4 business days after the clinic has received the results. If you do not hear from us within 7 days, please contact the clinic through MyChart or phone. If you have a critical or abnormal lab result, we will notify you by phone as soon as possible.  Submit refill requests through [facility] [employer] [facility] or call your pharmacy and they will forward the refill request to us. Please allow 3 business days for your refill to be completed.          Additional Information About Your Visit        [facility]  Information     [employer] gives you secure access to your electronic health record. If you see a primary care provider, you can also send messages to your care team and make appointments. If you have questions, please call your primary care clinic.  If you do not have a primary care provider, please call [phone] and they will assist you.        Care EveryWhere ID     This is your Care EveryWhere ID. This could be used by other organizations to access your [place] medical records  [id number]        Your Vitals Were     Pulse Temperature Pulse Oximetry BMI (Body Mass Index)          74 97.7  F (36.5  C) (Oral) 98% 33.19 kg/m2         Blood Pressure from Last 3 Encounters:   08/03/18 108/66   07/02/18 110/76   06/27/18 122/76    Weight from Last 3 Encounters:   08/03/18 236 lb 9.6 oz (107.3 kg)   07/02/18 241 lb (109.3 kg)   06/27/18 240 lb 12.8 oz (109.2 kg)              Today, you had the following     No orders found for display         Today's Medication Changes          These changes are accurate as of 8/3/18  5:06 PM.  If you have any questions, ask your nurse or doctor.               Start taking these medicines.        Dose/Directions    albuterol 108 (90 Base) MCG/ACT Inhaler   Commonly known as:  PROAIR HFA/PROVENTIL HFA/VENTOLIN HFA   Used for:  COPD exacerbation (H), Former heavy tobacco smoker, Upper respiratory tract infection, unspecified type   Started by:  Kayce Desai MD        Dose:  2 puff   Inhale 2 puffs into the lungs every 6 hours as needed for shortness of breath / dyspnea or wheezing   Quantity:  1 Inhaler   Refills:  1       amoxicillin-clavulanate 875-125 MG per tablet   Commonly known as:  AUGMENTIN   Used for:  Upper respiratory tract infection, unspecified type   Started by:  Kayce Desai MD        Dose:  1 tablet   Take 1 tablet by mouth 2 times daily for 7 days   Quantity:  14 tablet   Refills:  1       azithromycin 250 MG tablet   Commonly known as:  ZITHROMAX    Used for:  COPD exacerbation (H), Former heavy tobacco smoker, Upper respiratory tract infection, unspecified type   Started by:  Kayce Desai MD        Two tablets first day, then one tablet daily for four days.   Quantity:  6 tablet   Refills:  1       predniSONE 20 MG tablet   Commonly known as:  DELTASONE   Used for:  COPD exacerbation (H), Former heavy tobacco smoker, Upper respiratory tract infection, unspecified type   Started by:  Kayce Desai MD        Dose:  20 mg   Take 1 tablet (20 mg) by mouth 2 times daily   Quantity:  10 tablet   Refills:  1            Where to get your medicines      Some of these will need a paper prescription and others can be bought over the counter.  Ask your nurse if you have questions.     Bring a paper prescription for each of these medications     albuterol 108 (90 Base) MCG/ACT Inhaler    amoxicillin-clavulanate 875-125 MG per tablet    azithromycin 250 MG tablet    predniSONE 20 MG tablet                Primary Care Provider Office Phone # Fax #    Nabeel Lowe [phone]       [address] [address]        Equal Access to Services     [facility]: Hadii ruben joya hadasho Socarline, waaxda luqadaha, qaybta kaalmada hernandez, avtar zhou . [facility] [phone].    ATENCIÓN: Si habla español, tiene a layne disposición servicios gratwangos de asistencia lingüística. [facility] [phone].    We comply with applicable federal civil rights laws and [place] laws. We do not discriminate on the basis of race, color, national origin, age, disability, sex, sexual orientation, or gender identity.            Thank you!     Thank you for choosing [facility]  for your care. Our goal is always to provide you with excellent care. Hearing back from our patients is one way we can continue to improve our services. Please take a few minutes to complete the written survey that you may  receive in the mail after your visit with us. Thank you!             Your Updated Medication List - Protect others around you: Learn how to safely use, store and throw away your medicines at www.disposemymeds.org.          This list is accurate as of 8/3/18  5:06 PM.  Always use your most recent med list.                   Brand Name Dispense Instructions for use Diagnosis    ACCU-CHEK TIERNEY PLUS test strip   Generic drug:  blood glucose monitoring      1 strip by In Vitro route 2 times daily        albuterol 108 (90 Base) MCG/ACT Inhaler    PROAIR HFA/PROVENTIL HFA/VENTOLIN HFA    1 Inhaler    Inhale 2 puffs into the lungs every 6 hours as needed for shortness of breath / dyspnea or wheezing    COPD exacerbation (H), Former heavy tobacco smoker, Upper respiratory tract infection, unspecified type       amoxicillin-clavulanate 875-125 MG per tablet    AUGMENTIN    14 tablet    Take 1 tablet by mouth 2 times daily for 7 days    Upper respiratory tract infection, unspecified type       aspirin 81 MG tablet      Take  by mouth daily.        azithromycin 250 MG tablet    ZITHROMAX    6 tablet    Two tablets first day, then one tablet daily for four days.    COPD exacerbation (H), Former heavy tobacco smoker, Upper respiratory tract infection, unspecified type       calcium-vitamin D 600-400 MG-UNIT per tablet    CALTRATE     Take 1 tablet by mouth daily        CO Q 10 PO      Take 40 mg by mouth daily        lisinopril 10 MG tablet    PRINIVIL/ZESTRIL    90 tablet    Take 1 tablet (10 mg) by mouth daily    Hypertension, benign essential, goal below 140/90       metFORMIN 500 MG 24 hr tablet    GLUCOPHAGE-XR     Take 500 mg by mouth 3 times daily (with meals)        metoprolol succinate 50 MG 24 hr tablet    TOPROL-XL     Take 50 mg by mouth daily        MULTIVITAMINS PO      Take  by mouth daily.        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

## 2018-08-09 NOTE — PROGRESS NOTES
"  SUBJECTIVE:   Yimi Mcqueen is a 62 year old male who presents to clinic today for the following health issues:    Cold  Patient reports for a bad cold for 2 weeks. He also had conjunctivitis that has cleared. Patient went to [facility] provider and he told him he had COPD. His assistant noticed he had a wet cough. They wanted to give him steroids. Patient was given Amoxicillin. He finished it yesterday and feels fine now. He is exercising excessively.     Musculoskeletal  He is experiencing moderate to severe muscle and joint aches. He feels some stiffness, but he is able to sleep. He would like to know if these symptoms are related to his statin. He switched from Atorvastatin to Crestor. He took a 2 week break from statin. He plans to start them again Tomorrow. He feels his wrist pain is a lot better. He has been off of NSAID's for 1 week. He is interested in who should be the head of his cardiac care.    Weight  Patient expresses that he has lost 20lbs.     Social History  Patient quit smoking 15 years ago.     Patient is cycling 4,000 miles a year.     Problem list and histories reviewed & adjusted, as indicated.  Additional history: as documented    Patient Active Problem List   Diagnosis     Advance Care Planning     Numerous moles     BMI 38.0-38.9,adult     Impacted cerumen     Hyperlipidemia with target LDL less than 100     Venous (peripheral) insufficiency     ED (erectile dysfunction)     CKD (chronic kidney disease) stage 2, GFR 60-89 ml/min     Hypertension, benign essential, goal below 140/90     Old myocardial infarction     Type 2 diabetes mellitus without complication, without long-term current use of insulin (H)     Past Surgical History:   Procedure Laterality Date     C MUSCLE-SKIN FLAP,LEG  \"       \"     C OPEN RX ANKLE DISLOCATN+FIXATN  ~'05    infected     COLONOSCOPY  05/12    repeat 5 yrs       Social History   Substance Use Topics     Smoking status: Former Smoker     Packs/day: 1.50     " Years: 20.00     Quit date: 1/1/2006     Smokeless tobacco: Never Used     Alcohol use Yes      Comment: Occ.      Family History   Problem Relation Age of Onset     Cancer - colorectal Mother      Colon Cancer Mother      HEART DISEASE Father          Current Outpatient Prescriptions   Medication Sig Dispense Refill     albuterol (PROAIR HFA/PROVENTIL HFA/VENTOLIN HFA) 108 (90 Base) MCG/ACT Inhaler Inhale 2 puffs into the lungs every 6 hours as needed for shortness of breath / dyspnea or wheezing 1 Inhaler 1     amoxicillin-clavulanate (AUGMENTIN) 875-125 MG per tablet Take 1 tablet by mouth 2 times daily for 7 days 14 tablet 1     aspirin 81 MG tablet Take  by mouth daily.       azithromycin (ZITHROMAX) 250 MG tablet Two tablets first day, then one tablet daily for four days. 6 tablet 1     blood glucose monitoring (ACCU-CHEK TIERNEY PLUS) test strip 1 strip by In Vitro route 2 times daily       calcium-vitamin D (CALTRATE) 600-400 MG-UNIT per tablet Take 1 tablet by mouth daily        Clopidogrel Bisulfate (PLAVIX PO) Take 75 mg by mouth daily        Coenzyme Q10 (CO Q 10 PO) Take 40 mg by mouth daily       dulaglutide (TRULICITY) 1.5 MG/0.5ML pen Inject 1.5 mg Subcutaneous every 7 days       lisinopril (PRINIVIL/ZESTRIL) 10 MG tablet Take 1 tablet (10 mg) by mouth daily 90 tablet 0     metFORMIN (GLUCOPHAGE-XR) 500 MG 24 hr tablet Take 500 mg by mouth 3 times daily (with meals)       metoprolol (TOPROL-XL) 50 MG 24 hr tablet Take 50 mg by mouth daily       Multiple Vitamin (MULTIVITAMINS PO) Take  by mouth daily.       nitroGLYcerin (NITROSTAT) 0.4 MG sublingual tablet Place 1 tablet (0.4 mg) under the tongue every 5 minutes as needed for chest pain 25 tablet 5     predniSONE (DELTASONE) 20 MG tablet Take 1 tablet (20 mg) by mouth 2 times daily 10 tablet 1     rosuvastatin (CRESTOR) 40 MG tablet Take 1 tablet (40 mg) by mouth daily 90 tablet 1     No Known Allergies  Recent Labs   Lab Test  05/31/18   1145  02/08/18 01/06/17   1007  09/19/16   1445  09/25/15   1118   04/10/14   1453   A1C  6.2*  7.9*  7.3*  7.1*  7.3*   < >  8.4*   LDL  53   --   63   --   145*   < >  161*   HDL  36*   --   38*   --   39*   < >  32*   TRIG  71   --   165*   --   265*   < >  161*   ALT  31   --   46   --   40   --   48   CR  0.81  0.79  0.70   --   0.92   < >  0.86   GFRESTIMATED  >90  97  >90  Non  GFR Calc     --   85   < >  >90   GFRESTBLACK  >90  112  >90  African American GFR Calc     --   >90   GFR Calc     < >  >90   POTASSIUM  5.1  4.7  4.9   --   4.8   < >  4.1   TSH   --    --    --   0.50   --    --   0.52    < > = values in this interval not displayed.      BP Readings from Last 3 Encounters:   08/10/18 98/62   08/03/18 108/66   07/02/18 110/76    Wt Readings from Last 3 Encounters:   08/10/18 104.9 kg (231 lb 4.8 oz)   08/03/18 107.3 kg (236 lb 9.6 oz)   07/02/18 109.3 kg (241 lb)                  Labs reviewed in EPIC    Reviewed and updated as needed this visit by clinical staff  Tobacco  Allergies       Reviewed and updated as needed this visit by Provider         ROS:  Remainder of ROS is negative unless otherwise noted above or in HPI.    This document serves as a record of the services and decisions personally performed and made by Nabeel Lowe MD. It was created on his behalf by Jeniffer Woods, a trained medical scribe. The creation of this document is based on the provider's statements to the medical scribe.  Jeniffer Woods 3:27 PM August 10, 2018    OBJECTIVE:     BP 98/62  Pulse 98  Temp 98.2  F (36.8  C) (Oral)  Resp 16  Wt 104.9 kg (231 lb 4.8 oz)  SpO2 98%  BMI 32.44 kg/m2  Body mass index is 32.44 kg/(m^2).  GENERAL APPEARANCE: healthy, alert and no distress  RESP: lungs clear to auscultation - no rales, rhonchi or wheezes  CV: regular rates and rhythm, normal S1 S2, no S3 or S4 and no murmur, click or rub  MS: 1+ edema on left leg, calves are non tender, extremities  normal- no gross deformities noted  SKIN: no suspicious lesions or rashes    Diagnostic Test Results:  none     ASSESSMENT/PLAN:   (M25.50) Arthralgia of multiple sites  (primary encounter diagnosis)  Comment: Reported by Patient.   Plan: RHEUMATOLOGY REFERRAL  Advised to take a half of a tablet daily of current medication.     (E78.5) Hyperlipidemia with target LDL less than 100  Comment: Patient is exercising regularly.   Plan: rosuvastatin (CRESTOR) 20 MG tablet        Patient Instructions   Wait 6-8 weeks, and decide if wanting to see Pulmonology for lab work.     The information in this document, created by the medical scribe for me, accurately reflects the services I personally performed and the decisions made by me. I have reviewed and approved this document for accuracy prior to leaving the patient care area.  August 10, 2018 4:00 PM    Nabeel Lowe MD  [facility]

## 2018-08-10 ENCOUNTER — OFFICE VISIT (OUTPATIENT)
Dept: FAMILY MEDICINE | Facility: CLINIC | Age: 63
End: 2018-08-10
Payer: COMMERCIAL

## 2018-08-10 VITALS
DIASTOLIC BLOOD PRESSURE: 62 MMHG | WEIGHT: 231.3 LBS | OXYGEN SATURATION: 98 % | RESPIRATION RATE: 16 BRPM | SYSTOLIC BLOOD PRESSURE: 98 MMHG | BODY MASS INDEX: 32.44 KG/M2 | TEMPERATURE: 98.2 F | HEART RATE: 98 BPM

## 2018-08-10 DIAGNOSIS — M25.50 ARTHRALGIA OF MULTIPLE SITES: Primary | ICD-10-CM

## 2018-08-10 DIAGNOSIS — E78.5 HYPERLIPIDEMIA WITH TARGET LDL LESS THAN 100: ICD-10-CM

## 2018-08-10 PROCEDURE — 99214 OFFICE O/P EST MOD 30 MIN: CPT | Performed by: FAMILY MEDICINE

## 2018-08-10 RX ORDER — ROSUVASTATIN CALCIUM 20 MG/1
20 TABLET, COATED ORAL DAILY
Qty: 90 TABLET | Refills: 3 | Status: SHIPPED | OUTPATIENT
Start: 2018-08-10 | End: 2021-05-23

## 2018-08-10 NOTE — MR AVS SNAPSHOT
After Visit Summary   8/10/2018    Yimi Mcqueen    MRN: [id number]           Patient Information     Date Of Birth          1955        Visit Information        Provider Department      8/10/2018 3:00 PM Nabeel Lowe MD [facility]        Today's Diagnoses     Arthralgia of multiple sites    -  1    Hyperlipidemia with target LDL less than 100          Care Instructions    Wait 6-8 weeks, and decide if wanting to see Pulmonology for lab work.           Follow-ups after your visit        Additional Services     RHEUMATOLOGY REFERRAL       Your provider has referred you to: [facility] [phone]   [email or web address]    Please be aware that coverage of these services is subject to the terms and limitations of your health insurance plan.  Call member services at your health plan with any benefit or coverage questions.      Please bring the following with you to your appointment:    (1) Any X-Rays, CTs or MRIs which have been performed.  Contact the facility where they were done to arrange for  prior to your scheduled appointment.    (2) List of current medications   (3) This referral request   (4) Any documents/labs given to you for this referral                  Follow-up notes from your care team     Return if symptoms worsen or fail to improve.      Your next 10 appointments already scheduled     Jan 02, 2019  9:30 AM CST   [facility]    [address] [place] [address] [phone]              Who to contact     If you have questions or need follow up information about today's clinic visit or your schedule please contact [facility] directly at [phone].  Normal or non-critical lab and imaging results will be communicated to you by MyChart, letter or phone within 4 business days after  the clinic has received the results. If you do not hear from us within 7 days, please contact the clinic through [email or web address] or phone. If you have a critical or abnormal lab result, we will notify you by phone as soon as possible.  Submit refill requests through [email or web address] or call your pharmacy and they will forward the refill request to us. Please allow 3 business days for your refill to be completed.          Additional Information About Your Visit        [email or web address] Information     [email or web address] gives you secure access to your electronic health record. If you see a primary care provider, you can also send messages to your care team and make appointments. If you have questions, please call your primary care clinic.  If you do not have a primary care provider, please call [phone] and they will assist you.        Care EveryWhere ID     This is your Care EveryWhere ID. This could be used by other organizations to access your [facility] medical records  [id number]        Your Vitals Were     Pulse Temperature Respirations Pulse Oximetry BMI (Body Mass Index)       98 98.2  F (36.8  C) (Oral) 16 98% 32.44 kg/m2        Blood Pressure from Last 3 Encounters:   08/10/18 98/62   08/03/18 108/66   07/02/18 110/76    Weight from Last 3 Encounters:   08/10/18 231 lb 4.8 oz (104.9 kg)   08/03/18 236 lb 9.6 oz (107.3 kg)   07/02/18 241 lb (109.3 kg)              We Performed the Following     RHEUMATOLOGY REFERRAL          Today's Medication Changes          These changes are accurate as of 8/10/18 11:59 PM.  If you have any questions, ask your nurse or doctor.               These medicines have changed or have updated prescriptions.        Dose/Directions    rosuvastatin 20 MG tablet   Commonly known as:  CRESTOR   This may have changed:    - medication strength  - how much to take   Used for:  Hyperlipidemia with target LDL less than 100   Changed by:  Nabeel Lowe MD        Dose:  20 mg   Take 1 tablet (20 mg) by mouth daily    Quantity:  90 tablet   Refills:  3            Where to get your medicines      These medications were sent to [facility] - [place], [place] - [address]  [address], [place] [address]     Phone:  [phone]     rosuvastatin 20 MG tablet                Primary Care Provider Office Phone # Fax #    Nabeel Lowe [phone]       [address]  [facility] [address]        Equal Access to Services     [facility]: Hadii aad ku hadasho Soomaali, waaxda luqadaha, qaybta kaalmada adeegyada, waxay idiin hayaan adeedson kharash abelardo . So [facility] [phone].    ATENCIÓN: Si habla español, tiene a layne disposición servicios gratuitos de asistencia lingüística. [facility] [phone].    We comply with applicable federal civil rights laws and [place] laws. We do not discriminate on the basis of race, color, national origin, age, disability, sex, sexual orientation, or gender identity.            Thank you!     Thank you for choosing [facility]  for your care. Our goal is always to provide you with excellent care. Hearing back from our patients is one way we can continue to improve our services. Please take a few minutes to complete the written survey that you may receive in the mail after your visit with us. Thank you!             Your Updated Medication List - Protect others around you: Learn how to safely use, store and throw away your medicines at www.disposemymeds.org.          This list is accurate as of 8/10/18 11:59 PM.  Always use your most recent med list.                   Brand Name Dispense Instructions for use Diagnosis    ACCU-CHEK TIERNEY PLUS test strip   Generic drug:  blood glucose monitoring      1 strip by In Vitro route 2 times daily        albuterol 108 (90 Base) MCG/ACT inhaler    PROAIR HFA/PROVENTIL HFA/VENTOLIN HFA    1 Inhaler    Inhale 2 puffs into the lungs every 6 hours as needed for  shortness of breath / dyspnea or wheezing    COPD exacerbation (H), Former heavy tobacco smoker, Upper respiratory tract infection, unspecified type       amoxicillin-clavulanate 875-125 MG per tablet    AUGMENTIN    14 tablet    Take 1 tablet by mouth 2 times daily for 7 days    Upper respiratory tract infection, unspecified type       aspirin 81 MG tablet      Take  by mouth daily.        azithromycin 250 MG tablet    ZITHROMAX    6 tablet    Two tablets first day, then one tablet daily for four days.    COPD exacerbation (H), Former heavy tobacco smoker, Upper respiratory tract infection, unspecified type       calcium-vitamin D 600-400 MG-UNIT per tablet    CALTRATE     Take 1 tablet by mouth daily        CO Q 10 PO      Take 40 mg by mouth daily        lisinopril 10 MG tablet    PRINIVIL/ZESTRIL    90 tablet    Take 1 tablet (10 mg) by mouth daily    Hypertension, benign essential, goal below 140/90       metFORMIN 500 MG 24 hr tablet    GLUCOPHAGE-XR     Take 500 mg by mouth 3 times daily (with meals)        metoprolol succinate 50 MG 24 hr tablet    TOPROL-XL     Take 50 mg by mouth daily        MULTIVITAMINS PO      Take  by mouth daily.        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

## 2018-09-10 ENCOUNTER — TELEPHONE (OUTPATIENT)
Dept: FAMILY MEDICINE | Facility: CLINIC | Age: 63
End: 2018-09-10

## 2018-09-10 DIAGNOSIS — E78.5 HYPERLIPIDEMIA WITH TARGET LDL LESS THAN 100: Primary | ICD-10-CM

## 2018-09-10 NOTE — TELEPHONE ENCOUNTER
Dr. Lowe,    Please see phone message below.     Cued lab. Please review/sign or advise.    Pattie Ferraro RN  [facility]

## 2018-09-10 NOTE — TELEPHONE ENCOUNTER
Reason for call:  Order   Order or referral being requested: Cholesterol check-lab orders  Reason for request: NA  Date needed: as soon as possible  Has the patient been seen by the PCP for this problem? YES    Additional comments: The patients wife called and stated that he needs to have his cholesterol checked before his appointment with the cardiologist on 9/18.  He needs Dr. Lowe to put in orders so he can get that checked. His wife also called and stated that she also has questions about how long it will take for the labs to come back. She would like a call back to discuss this.    Phone number to reach patient:  Home number on file [phone] (home)    Best Time: Any    Can we leave a detailed message on this number?  YES

## 2018-09-11 NOTE — TELEPHONE ENCOUNTER
Informed the pt the lab is in, come fasting 10 to 12 hours before    Pt continues with pain and has cut the Crestor 0.5 taking 10mg daily he started this at the end of July    Pamella Cavazos RN   [facility]

## 2018-09-18 ENCOUNTER — TRANSFERRED RECORDS (OUTPATIENT)
Dept: HEALTH INFORMATION MANAGEMENT | Facility: CLINIC | Age: 63
End: 2018-09-18

## 2018-09-18 DIAGNOSIS — I10 HYPERTENSION, BENIGN ESSENTIAL, GOAL BELOW 140/90: ICD-10-CM

## 2018-09-18 RX ORDER — LISINOPRIL 10 MG/1
TABLET ORAL
Qty: 90 TABLET | Refills: 2 | Status: SHIPPED | OUTPATIENT
Start: 2018-09-18 | End: 2019-06-10

## 2018-09-18 NOTE — TELEPHONE ENCOUNTER
"Requested Prescriptions   Pending Prescriptions Disp Refills     lisinopril (PRINIVIL/ZESTRIL) 10 MG tablet [Pharmacy Med Name: LISINOPRIL 10MG TABS] 90 tablet 0    Last Written Prescription Date:  06/27/2018  Last Fill Quantity: 90,  # refills: 0   Last office visit: 8/10/2018 with prescribing provider:  08/10/2018   Future Office Visit:     Sig: TAKE ONE TABLET BY MOUTH EVERY DAY    ACE Inhibitors (Including Combos) Protocol Passed    9/18/2018  9:07 AM       Passed - Blood pressure under 140/90 in past 12 months    BP Readings from Last 3 Encounters:   08/10/18 98/62   08/03/18 108/66   07/02/18 110/76                Passed - Recent (12 mo) or future (30 days) visit within the authorizing provider's specialty    Patient had office visit in the last 12 months or has a visit in the next 30 days with authorizing provider or within the authorizing provider's specialty.  See \"Patient Info\" tab in inbasket, or \"Choose Columns\" in Meds & Orders section of the refill encounter.           Passed - Patient is age 18 or older       Passed - Normal serum creatinine on file in past 12 months    Recent Labs   Lab Test  05/31/18   1142   CR  0.81            Passed - Normal serum potassium on file in past 12 months    Recent Labs   Lab Test  05/31/18   1142   POTASSIUM  5.1               "

## 2018-10-30 ENCOUNTER — TRANSFERRED RECORDS (OUTPATIENT)
Dept: HEALTH INFORMATION MANAGEMENT | Facility: CLINIC | Age: 63
End: 2018-10-30

## 2018-11-02 ENCOUNTER — TRANSFERRED RECORDS (OUTPATIENT)
Dept: HEALTH INFORMATION MANAGEMENT | Facility: CLINIC | Age: 63
End: 2018-11-02

## 2018-11-02 DIAGNOSIS — E78.00 HYPERCHOLESTEREMIA: Primary | ICD-10-CM

## 2018-11-02 PROCEDURE — 36415 COLL VENOUS BLD VENIPUNCTURE: CPT | Performed by: FAMILY MEDICINE

## 2018-11-02 PROCEDURE — 86038 ANTINUCLEAR ANTIBODIES: CPT | Performed by: FAMILY MEDICINE

## 2018-11-06 LAB — ANA SER QL IF: NEGATIVE

## 2018-11-07 ENCOUNTER — HOSPITAL ENCOUNTER (OUTPATIENT)
Dept: GENERAL RADIOLOGY | Facility: CLINIC | Age: 63
Discharge: HOME | End: 2018-11-07
Attending: FAMILY MEDICINE | Admitting: FAMILY MEDICINE
Payer: COMMERCIAL

## 2018-11-07 ENCOUNTER — OFFICE VISIT (OUTPATIENT)
Dept: FAMILY MEDICINE | Facility: CLINIC | Age: 63
End: 2018-11-07
Payer: COMMERCIAL

## 2018-11-07 VITALS
HEART RATE: 64 BPM | OXYGEN SATURATION: 96 % | TEMPERATURE: 97.7 F | RESPIRATION RATE: 18 BRPM | DIASTOLIC BLOOD PRESSURE: 72 MMHG | SYSTOLIC BLOOD PRESSURE: 118 MMHG

## 2018-11-07 DIAGNOSIS — Z23 NEED FOR PROPHYLACTIC VACCINATION AND INOCULATION AGAINST INFLUENZA: ICD-10-CM

## 2018-11-07 DIAGNOSIS — R79.82 ELEVATED C-REACTIVE PROTEIN (CRP): ICD-10-CM

## 2018-11-07 DIAGNOSIS — R05.8 RECURRENT PRODUCTIVE COUGH: Primary | ICD-10-CM

## 2018-11-07 PROCEDURE — 99214 OFFICE O/P EST MOD 30 MIN: CPT | Performed by: FAMILY MEDICINE

## 2018-11-07 PROCEDURE — 86141 C-REACTIVE PROTEIN HS: CPT | Performed by: FAMILY MEDICINE

## 2018-11-07 PROCEDURE — 36415 COLL VENOUS BLD VENIPUNCTURE: CPT | Performed by: FAMILY MEDICINE

## 2018-11-07 PROCEDURE — 71046 X-RAY EXAM CHEST 2 VIEWS: CPT

## 2018-11-07 RX ORDER — ROSUVASTATIN CALCIUM 40 MG/1
40 TABLET, COATED ORAL DAILY
COMMUNITY
Start: 2018-09-19 | End: 2019-07-10

## 2018-11-07 NOTE — PATIENT INSTRUCTIONS
If CRP is elevated, would be a good ideas to see Cardiologist. Will notify with results.     Call [phone] for an x-ray.

## 2018-11-07 NOTE — PROGRESS NOTES
"  SUBJECTIVE:   Yimi Mcqueen is a 63 year old male who presents to clinic today for the following health issues with wife:    Follow Up  Patient reports for a follow up after seeing his Cardiologist Karthikeyan Kelly MD at [facility]. He expresses having a CRP of 32. Patient thought that his result could be related to his Trulicity. He has been doing research online, and he is scared from the results. Patient has been on 40mg of Crestor for 1.5 weeks.     Respiratory  Patient coughs up \"creamish white brown\" pea size thick phlegm each morning. It does not hurt to breath when biking.     GI  Patient has recently dealt with a bout of diarrhea and constipation. Usually he has a daily bowel movement. He started to have a bowel movement every 3 days and felt a strain. He thought he had a hernia. The next day the pain resolved, but his testicle was swollen. He felt a pain that radiated up his groin area. It is non tender nor is there edema now. Patient bought a jug of prunes to try.    Weight/Diet/Exercise  Patient has lost 60lbs.He has gotten away from his diet. Patient bikes 4000 miles each year.     Immunization  Patient would like a Flu shot.     Colon cancer screening  Patient reports there were polyps seen in his previous colon cancer screenings. His last Colonoscopy was on 12-.    Family medical History  Colon cancer runs in his family. Nobody in his family has had heart trouble or Lupus.     Problem list and histories reviewed & adjusted, as indicated.  Additional history: as documented    Patient Active Problem List   Diagnosis     Advance Care Planning     Numerous moles     BMI 38.0-38.9,adult     Impacted cerumen     Hyperlipidemia with target LDL less than 100     Venous (peripheral) insufficiency     ED (erectile dysfunction)     CKD (chronic kidney disease) stage 2, GFR 60-89 ml/min     Hypertension, benign essential, goal below 140/90     " "Old myocardial infarction     Type 2 diabetes mellitus without complication, without long-term current use of insulin (H)     Past Surgical History:   Procedure Laterality Date     C MUSCLE-SKIN FLAP,LEG  \"       \"     C OPEN RX ANKLE DISLOCATN+FIXATN  ~'05    infected     COLONOSCOPY  05/12    repeat 5 yrs       Social History   Substance Use Topics     Smoking status: Former Smoker     Packs/day: 1.50     Years: 20.00     Quit date: 1/1/2006     Smokeless tobacco: Never Used     Alcohol use Yes      Comment: Occ.      Family History   Problem Relation Age of Onset     Cancer - colorectal Mother      Colon Cancer Mother      HEART DISEASE Father          Current Outpatient Prescriptions   Medication Sig Dispense Refill     albuterol (PROAIR HFA/PROVENTIL HFA/VENTOLIN HFA) 108 (90 Base) MCG/ACT Inhaler Inhale 2 puffs into the lungs every 6 hours as needed for shortness of breath / dyspnea or wheezing 1 Inhaler 1     aspirin 81 MG tablet Take  by mouth daily.       azithromycin (ZITHROMAX) 250 MG tablet Two tablets first day, then one tablet daily for four days. 6 tablet 1     blood glucose monitoring (ACCU-CHEK TIERNEY PLUS) test strip 1 strip by In Vitro route 2 times daily       calcium-vitamin D (CALTRATE) 600-400 MG-UNIT per tablet Take 1 tablet by mouth daily        Clopidogrel Bisulfate (PLAVIX PO) Take 75 mg by mouth daily        Coenzyme Q10 (CO Q 10 PO) Take 40 mg by mouth daily       dulaglutide (TRULICITY) 1.5 MG/0.5ML pen Inject 1.5 mg Subcutaneous every 7 days       lisinopril (PRINIVIL/ZESTRIL) 10 MG tablet TAKE ONE TABLET BY MOUTH EVERY DAY 90 tablet 2     metFORMIN (GLUCOPHAGE-XR) 500 MG 24 hr tablet Take 500 mg by mouth 3 times daily (with meals)       metoprolol (TOPROL-XL) 50 MG 24 hr tablet Take 50 mg by mouth daily       Multiple Vitamin (MULTIVITAMINS PO) Take  by mouth daily.       nitroGLYcerin (NITROSTAT) 0.4 MG sublingual tablet Place 1 tablet (0.4 mg) under the tongue every 5 minutes as " needed for chest pain 25 tablet 5     predniSONE (DELTASONE) 20 MG tablet Take 1 tablet (20 mg) by mouth 2 times daily 10 tablet 1     rosuvastatin (CRESTOR) 20 MG tablet Take 1 tablet (20 mg) by mouth daily 90 tablet 3     rosuvastatin (CRESTOR) 40 MG tablet Take 40 mg by mouth daily       No Known Allergies  Recent Labs   Lab Test  05/31/18   1142 02/08/18 01/06/17   1007  09/19/16   1445  09/25/15   1118   04/10/14   1453   A1C  6.2*  7.9*  7.3*  7.1*  7.3*   < >  8.4*   LDL  53   --   63   --   145*   < >  161*   HDL  36*   --   38*   --   39*   < >  32*   TRIG  71   --   165*   --   265*   < >  161*   ALT  31   --   46   --   40   --   48   CR  0.81  0.79  0.70   --   0.92   < >  0.86   GFRESTIMATED  >90  97  >90  Non  GFR Calc     --   85   < >  >90   GFRESTBLACK  >90  112  >90  African American GFR Calc     --   >90   GFR Calc     < >  >90   POTASSIUM  5.1  4.7  4.9   --   4.8   < >  4.1   TSH   --    --    --   0.50   --    --   0.52    < > = values in this interval not displayed.      BP Readings from Last 3 Encounters:   11/07/18 118/72   08/10/18 98/62   08/03/18 108/66    Wt Readings from Last 3 Encounters:   08/10/18 104.9 kg (231 lb 4.8 oz)   08/03/18 107.3 kg (236 lb 9.6 oz)   07/02/18 109.3 kg (241 lb)         Labs reviewed in EPIC    Reviewed and updated as needed this visit by clinical staff       Reviewed and updated as needed this visit by Provider         ROS:  Remainder of ROS is negative unless otherwise noted above or in HPI.     This document serves as a record of the services and decisions personally performed and made by Nabeel Lowe MD. It was created on his behalf by Jeniffer Woods and Jeet Sethi, trained medical scribes. The creation of this document is based on the provider's statements to the medical scribe.  Jeniffer Woods 10:35 AM November 7, 2018    OBJECTIVE:     /72 (BP Location: Right arm, Patient Position: Sitting, Cuff Size: Adult  Regular)  Pulse 64  Temp 97.7  F (36.5  C) (Temporal)  Resp 18  SpO2 96%  There is no height or weight on file to calculate BMI.  GENERAL APPEARANCE: healthy, alert and no distress  RESP: forced expiration is not prolonged, otherwise lungs clear to auscultation - no rales, rhonchi or wheezes  CV: regular rates and rhythm, normal S1 S2, no S3 or S4 and no murmur, click or rub  JOINT/EXTREMITIES: 1+ edema on left leg with no edema on the right leg, otherwise extremities normal- no gross deformities noted, gait normal and normal muscle tone  Psychiatric: mentation appears normal and affect normal/bright    Diagnostic Test Results:  No results found for this or any previous visit (from the past 24 hour(s)).    ASSESSMENT/PLAN:     (R05) Recurrent productive cough  (primary encounter diagnosis)  Comment: Uncontrolled.   Plan: XR Chest 2 Views        Patient is going to complete x-rays. Will notify with results. Follow up as needed.     (Z05.82) Elevated C-reactive protein (CRP)  Comment: Uncontrolled.   Plan: CRP cardiac risk        Will notify with results. If elevated, Patient may see Cardiologist.     (Z23) Need for prophylactic vaccination and inoculation against influenza  Comment: Patient agrees to vaccine.   Plan: FLU VACCINE, SPLIT VIRUS, IM (QUADRIVALENT)         [99491]- >3 YRS, Vaccine Administration,         Initial [14953]            Patient Instructions   If CRP is elevated, would be a good ideas to see Cardiologist. Will notify with results.     Call [phone] for an x-ray.    The information in this document, created by the medical scribes for me, accurately reflects the services I personally performed and the decisions made by me. I have reviewed and approved this document for accuracy prior to leaving the patient care area.  November 7, 2018 3:21 PM    Nabeel Lowe MD  [facility]

## 2018-11-07 NOTE — PROGRESS NOTES
Elvin Geller: Your recent results show no acute lung disease, an old infection scar (granuloma). If cough not improving over next 4-6 weeks recommend Ct chest.    Nabeel

## 2018-11-07 NOTE — MR AVS SNAPSHOT
After Visit Summary   11/7/2018    Yimi Mcqueen    MRN: [id number]           Patient Information     Date Of Birth          1955        Visit Information        Provider Department      11/7/2018 10:15 AM Nabeel Lowe MD [facility]        Today's Diagnoses     Recurrent productive cough    -  1    Elevated C-reactive protein (CRP)        Need for prophylactic vaccination and inoculation against influenza          Care Instructions    If CRP is elevated, would be a good ideas to see Cardiologist. Will notify with results.     Call [phone] for an x-ray.          Follow-ups after your visit        Follow-up notes from your care team     Return in about 1 year (around 11/7/2019) for Physical Exam, Routine Visit.      Your next 10 appointments already scheduled     Jan 02, 2019  9:30 AM CST   SHORT with Anand Jenkins RPH   [facility] ([facility])    [address] [place] [address] [phone]              Who to contact     If you have questions or need follow up information about today's clinic visit or your schedule please contact [facility] directly at [phone].  Normal or non-critical lab and imaging results will be communicated to you by MyChart, letter or phone within 4 business days after the clinic has received the results. If you do not hear from us within 7 days, please contact the clinic through MyChart or phone. If you have a critical or abnormal lab result, we will notify you by phone as soon as possible.  Submit refill requests through [employer] or call your pharmacy and they will forward the refill request to us. Please allow 3 business days for your refill to be completed.          Additional Information About Your Visit        MyChart Information     [employer] gives you secure access to your electronic health record. If you see a primary care provider, you can also  send messages to your care team and make appointments. If you have questions, please call your primary care clinic.  If you do not have a primary care provider, please call [phone] and they will assist you.        Care EveryWhere ID     This is your Care EveryWhere ID. This could be used by other organizations to access your [facility] medical records  [id number]        Your Vitals Were     Pulse Temperature Respirations Pulse Oximetry          64 97.7  F (36.5  C) (Temporal) 18 96%         Blood Pressure from Last 3 Encounters:   11/07/18 118/72   08/10/18 98/62   08/03/18 108/66    Weight from Last 3 Encounters:   08/10/18 231 lb 4.8 oz (104.9 kg)   08/03/18 236 lb 9.6 oz (107.3 kg)   07/02/18 241 lb (109.3 kg)              We Performed the Following     CRP cardiac risk     Vaccine Administration, Initial [12294]     XR Chest 2 Views        Primary Care Provider Office Phone # Fax #    Nabeel Lowe [phone]       [address] [address]        Equal Access to Services     [facility]: Hadii aad ku hadasho Soomaali, waaxda luqadaha, qaybta kaalmada adeegyada, avtar zhou . So [facility] [phone].    ATENCIÓN: Si habla español, tiene a layne disposición servicios gratuitos de asistencia lingüística. [facility] [phone].    We comply with applicable federal civil rights laws and [place] laws. We do not discriminate on the basis of race, color, national origin, age, disability, sex, sexual orientation, or gender identity.            Thank you!     Thank you for choosing [facility]  for your care. Our goal is always to provide you with excellent care. Hearing back from our patients is one way we can continue to improve our services. Please take a few minutes to complete the written survey that you may receive in the mail after your visit with us. Thank you!             Your Updated Medication List - Protect others  around you: Learn how to safely use, store and throw away your medicines at www.disposemymeds.org.          This list is accurate as of 11/7/18 11:59 PM.  Always use your most recent med list.                   Brand Name Dispense Instructions for use Diagnosis    ACCU-CHEK TIERNEY PLUS test strip   Generic drug:  blood glucose monitoring      1 strip by In Vitro route 2 times daily        albuterol 108 (90 Base) MCG/ACT inhaler    PROAIR HFA/PROVENTIL HFA/VENTOLIN HFA    1 Inhaler    Inhale 2 puffs into the lungs every 6 hours as needed for shortness of breath / dyspnea or wheezing    COPD exacerbation (H), Former heavy tobacco smoker, Upper respiratory tract infection, unspecified type       aspirin 81 MG tablet      Take  by mouth daily.        azithromycin 250 MG tablet    ZITHROMAX    6 tablet    Two tablets first day, then one tablet daily for four days.    COPD exacerbation (H), Former heavy tobacco smoker, Upper respiratory tract infection, unspecified type       calcium carbonate 600 mg-vitamin D 400 units 600-400 MG-UNIT per tablet    CALTRATE     Take 1 tablet by mouth daily        CO Q 10 PO      Take 40 mg by mouth daily        lisinopril 10 MG tablet    PRINIVIL/ZESTRIL    90 tablet    TAKE ONE TABLET BY MOUTH EVERY DAY    Hypertension, benign essential, goal below 140/90       metFORMIN 500 MG 24 hr tablet    GLUCOPHAGE-XR     Take 500 mg by mouth 3 times daily (with meals)        metoprolol succinate 50 MG 24 hr tablet    TOPROL-XL     Take 50 mg by mouth daily        MULTIVITAMINS PO      Take  by mouth daily.        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)       PLAVIX PO      Take 75 mg by mouth daily        predniSONE 20 MG tablet    DELTASONE    10 tablet    Take 1 tablet (20 mg) by mouth 2 times daily     COPD exacerbation (H), Former heavy tobacco smoker, Upper respiratory tract infection, unspecified type       * rosuvastatin 20 MG tablet    CRESTOR    90 tablet    Take 1 tablet (20 mg) by mouth daily    Hyperlipidemia with target LDL less than 100       * rosuvastatin 40 MG tablet    CRESTOR     Take 40 mg by mouth daily        TRULICITY 1.5 MG/0.5ML pen   Generic drug:  dulaglutide      Inject 1.5 mg Subcutaneous every 7 days        * Notice:  This list has 2 medication(s) that are the same as other medications prescribed for you. Read the directions carefully, and ask your doctor or other care provider to review them with you.

## 2018-11-08 LAB — CRP SERPL HS-MCNC: 0.3 MG/L

## 2018-11-09 NOTE — PROGRESS NOTES
Elvin Geller, good news! your recent results are back and are all normal. Please contact if any questions.   Nabeel

## 2018-11-20 ENCOUNTER — TELEPHONE (OUTPATIENT)
Dept: FAMILY MEDICINE | Facility: CLINIC | Age: 63
End: 2018-11-20

## 2018-11-20 DIAGNOSIS — R82.90 CLOUDY URINE: Primary | ICD-10-CM

## 2018-11-20 DIAGNOSIS — N30.00 ACUTE CYSTITIS WITHOUT HEMATURIA: Primary | ICD-10-CM

## 2018-11-20 DIAGNOSIS — R82.90 CLOUDY URINE: ICD-10-CM

## 2018-11-20 LAB
ALBUMIN UR-MCNC: NEGATIVE MG/DL
APPEARANCE UR: CLEAR
BACTERIA #/AREA URNS HPF: ABNORMAL /HPF
BILIRUB UR QL STRIP: NEGATIVE
COLOR UR AUTO: YELLOW
GLUCOSE UR STRIP-MCNC: NEGATIVE MG/DL
HGB UR QL STRIP: ABNORMAL
KETONES UR STRIP-MCNC: NEGATIVE MG/DL
LEUKOCYTE ESTERASE UR QL STRIP: NEGATIVE
NITRATE UR QL: NEGATIVE
NON-SQ EPI CELLS #/AREA URNS LPF: ABNORMAL /LPF
PH UR STRIP: 6.5 PH (ref 5–7)
RBC #/AREA URNS AUTO: ABNORMAL /HPF
SOURCE: ABNORMAL
SP GR UR STRIP: 1.01 (ref 1–1.03)
UROBILINOGEN UR STRIP-ACNC: 0.2 EU/DL (ref 0.2–1)
WBC #/AREA URNS AUTO: ABNORMAL /HPF

## 2018-11-20 PROCEDURE — 81001 URINALYSIS AUTO W/SCOPE: CPT | Performed by: FAMILY MEDICINE

## 2018-11-20 PROCEDURE — 87086 URINE CULTURE/COLONY COUNT: CPT | Performed by: FAMILY MEDICINE

## 2018-11-20 RX ORDER — NITROFURANTOIN 25; 75 MG/1; MG/1
100 CAPSULE ORAL 2 TIMES DAILY
Qty: 14 CAPSULE | Refills: 0 | Status: SHIPPED | OUTPATIENT
Start: 2018-11-20 | End: 2019-07-10

## 2018-11-20 NOTE — TELEPHONE ENCOUNTER
Patient returned call to clinic. Notified of provider message below. Patient verbalized understanding.    Patient prefers [facility] in [place]. Script faxed to [phone]    Dyan Freeman RN  Triage Nurse

## 2018-11-20 NOTE — TELEPHONE ENCOUNTER
Route to provider pool    Has some discomfort when urinating, urgency, but wasn't able to void a few nights ago with the urgency  When voiding  Urine cloudy, burns    Advised increase fliuds    Lab cued    Pamella Cavazos RN   [facility]

## 2018-11-20 NOTE — TELEPHONE ENCOUNTER
Pt's wife is requesting an order to be put in for a UA and would like a call back saying it's done so that the pt can come in today to leave sample    Wife can be reached @ [phone] phani

## 2018-11-20 NOTE — TELEPHONE ENCOUNTER
Notified pt, he is planning on going to the [facility] lab today    Pamella Cavazos RN   [facility]

## 2018-11-20 NOTE — TELEPHONE ENCOUNTER
Please ernesto him/ Urine shows some bacteria   start on   Orders Placed This Encounter     nitroFURantoin, macrocrystal-monohydrate, (MACROBID) 100 MG capsule     Sig: Take 1 capsule (100 mg) by mouth 2 times daily     Dispense:  14 capsule     Refill:  0     Pending UC   I do not have a pharmacy that they want so I printed the PRESCRIPTION

## 2018-11-20 NOTE — TELEPHONE ENCOUNTER
Called patient. Left message with wife to return call to clinic    Rx local printed and at my desk, need to determine where patient would like script sent.     Dyan Freeman, RN  Triage Nurse

## 2018-11-21 LAB
BACTERIA SPEC CULT: NORMAL
SPECIMEN SOURCE: NORMAL

## 2018-12-05 ENCOUNTER — MYC MEDICAL ADVICE (OUTPATIENT)
Dept: FAMILY MEDICINE | Facility: CLINIC | Age: 63
End: 2018-12-05
Payer: COMMERCIAL

## 2018-12-05 DIAGNOSIS — R31.0 GROSS HEMATURIA: ICD-10-CM

## 2018-12-05 DIAGNOSIS — R79.82 ELEVATED C-REACTIVE PROTEIN: ICD-10-CM

## 2018-12-05 NOTE — TELEPHONE ENCOUNTER
Dr. Lowe,    Please see mychart message from patient. CRP on 11/07/18 was 0.3. MAXIMO on 11/02/18 was negative    Cued new CRP if in agreement with patient repeating this lab. Not sure if you would like the cardiac risk or inflammation. Both cued.    Cued referral for Urology.    Please review/sign or advise.    Pattie Ferraro RN  [facility]

## 2018-12-07 DIAGNOSIS — E78.5 HYPERLIPIDEMIA WITH TARGET LDL LESS THAN 100: ICD-10-CM

## 2018-12-07 DIAGNOSIS — I25.2 OLD MYOCARDIAL INFARCTION: ICD-10-CM

## 2018-12-07 LAB
ALT SERPL W P-5'-P-CCNC: 27 U/L (ref 0–70)
AST SERPL W P-5'-P-CCNC: 25 U/L (ref 0–45)
CHOLEST SERPL-MCNC: 117 MG/DL
CK SERPL-CCNC: 128 U/L (ref 30–300)
CRP SERPL-MCNC: <2.9 MG/L (ref 0–8)
HDLC SERPL-MCNC: 45 MG/DL
LDLC SERPL CALC-MCNC: 54 MG/DL
NONHDLC SERPL-MCNC: 72 MG/DL
TRIGL SERPL-MCNC: 89 MG/DL

## 2018-12-07 PROCEDURE — 36415 COLL VENOUS BLD VENIPUNCTURE: CPT | Performed by: FAMILY MEDICINE

## 2018-12-07 PROCEDURE — 86140 C-REACTIVE PROTEIN: CPT | Performed by: FAMILY MEDICINE

## 2018-12-07 PROCEDURE — 84460 ALANINE AMINO (ALT) (SGPT): CPT | Performed by: FAMILY MEDICINE

## 2018-12-07 PROCEDURE — 80061 LIPID PANEL: CPT | Performed by: FAMILY MEDICINE

## 2018-12-07 PROCEDURE — 84450 TRANSFERASE (AST) (SGOT): CPT | Performed by: FAMILY MEDICINE

## 2018-12-07 PROCEDURE — 82550 ASSAY OF CK (CPK): CPT | Performed by: FAMILY MEDICINE

## 2018-12-07 NOTE — TELEPHONE ENCOUNTER
[employer] message sent to patient relaying lab orders and referral information    Dyan Freeman, RN  Triage Nurse

## 2018-12-08 NOTE — PROGRESS NOTES
Elvin Geller, your recent results are back and are all normal. Please contact if any questions.   Nabeel

## 2018-12-10 ENCOUNTER — TELEPHONE (OUTPATIENT)
Dept: FAMILY MEDICINE | Facility: CLINIC | Age: 63
End: 2018-12-10

## 2018-12-11 ENCOUNTER — TRANSFERRED RECORDS (OUTPATIENT)
Dept: HEALTH INFORMATION MANAGEMENT | Facility: CLINIC | Age: 63
End: 2018-12-11

## 2018-12-24 ENCOUNTER — MYC MEDICAL ADVICE (OUTPATIENT)
Dept: FAMILY MEDICINE | Facility: CLINIC | Age: 63
End: 2018-12-24

## 2018-12-24 DIAGNOSIS — N52.1 ERECTILE DYSFUNCTION DUE TO DISEASES CLASSIFIED ELSEWHERE: Primary | ICD-10-CM

## 2018-12-24 NOTE — TELEPHONE ENCOUNTER
Dr. Lowe    See pt mychart message    Med cued if you agree    Pamella Dirk RN   [facility]

## 2018-12-26 RX ORDER — SILDENAFIL CITRATE 20 MG/1
TABLET ORAL
Qty: 30 TABLET | Refills: 11 | Status: SHIPPED | OUTPATIENT
Start: 2018-12-26 | End: 2020-05-04

## 2018-12-26 NOTE — TELEPHONE ENCOUNTER
Brodie message to pt    Script mailed to pt  It was also faxed to local [facility]    Pamella Cavazos RN   [facility]

## 2019-01-14 ENCOUNTER — TRANSFERRED RECORDS (OUTPATIENT)
Dept: HEALTH INFORMATION MANAGEMENT | Facility: CLINIC | Age: 64
End: 2019-01-14

## 2019-04-12 ENCOUNTER — TELEPHONE (OUTPATIENT)
Dept: FAMILY MEDICINE | Facility: CLINIC | Age: 64
End: 2019-04-12

## 2019-04-15 ENCOUNTER — OFFICE VISIT (OUTPATIENT)
Dept: FAMILY MEDICINE | Facility: CLINIC | Age: 64
End: 2019-04-15
Payer: COMMERCIAL

## 2019-04-15 VITALS
OXYGEN SATURATION: 97 % | SYSTOLIC BLOOD PRESSURE: 106 MMHG | BODY MASS INDEX: 33.47 KG/M2 | TEMPERATURE: 97.8 F | HEIGHT: 71 IN | DIASTOLIC BLOOD PRESSURE: 68 MMHG | HEART RATE: 65 BPM | WEIGHT: 239.1 LBS

## 2019-04-15 DIAGNOSIS — M79.10 MYALGIA, UNSPECIFIED SITE: ICD-10-CM

## 2019-04-15 DIAGNOSIS — I10 HYPERTENSION, BENIGN ESSENTIAL, GOAL BELOW 140/90: ICD-10-CM

## 2019-04-15 DIAGNOSIS — Z00.00 ROUTINE GENERAL MEDICAL EXAMINATION AT A HEALTH CARE FACILITY: Primary | ICD-10-CM

## 2019-04-15 DIAGNOSIS — E11.9 TYPE 2 DIABETES MELLITUS WITHOUT COMPLICATION, WITHOUT LONG-TERM CURRENT USE OF INSULIN (H): ICD-10-CM

## 2019-04-15 DIAGNOSIS — N18.2 CKD (CHRONIC KIDNEY DISEASE) STAGE 2, GFR 60-89 ML/MIN: ICD-10-CM

## 2019-04-15 DIAGNOSIS — I25.2 OLD MYOCARDIAL INFARCTION: ICD-10-CM

## 2019-04-15 LAB
ALBUMIN SERPL-MCNC: 4.4 G/DL (ref 3.4–5)
ALP SERPL-CCNC: 48 U/L (ref 40–150)
ALT SERPL W P-5'-P-CCNC: 31 U/L (ref 0–70)
ANION GAP SERPL CALCULATED.3IONS-SCNC: 8 MMOL/L (ref 3–14)
AST SERPL W P-5'-P-CCNC: 26 U/L (ref 0–45)
BILIRUB SERPL-MCNC: 1.5 MG/DL (ref 0.2–1.3)
BUN SERPL-MCNC: 17 MG/DL (ref 7–30)
CALCIUM SERPL-MCNC: 9.6 MG/DL (ref 8.5–10.1)
CHLORIDE SERPL-SCNC: 108 MMOL/L (ref 94–109)
CHOLEST SERPL-MCNC: 124 MG/DL
CO2 SERPL-SCNC: 25 MMOL/L (ref 20–32)
CREAT SERPL-MCNC: 0.74 MG/DL (ref 0.66–1.25)
CREAT UR-MCNC: 160 MG/DL
CRP SERPL-MCNC: <2.9 MG/L (ref 0–8)
DEPRECATED CALCIDIOL+CALCIFEROL SERPL-MC: 77 UG/L (ref 20–75)
GFR SERPL CREATININE-BSD FRML MDRD: >90 ML/MIN/{1.73_M2}
GLUCOSE SERPL-MCNC: 99 MG/DL (ref 70–99)
HBA1C MFR BLD: 6.1 % (ref 0–5.6)
HDLC SERPL-MCNC: 48 MG/DL
LDLC SERPL CALC-MCNC: 62 MG/DL
MICROALBUMIN UR-MCNC: 16 MG/L
MICROALBUMIN/CREAT UR: 10.06 MG/G CR (ref 0–17)
NONHDLC SERPL-MCNC: 76 MG/DL
POTASSIUM SERPL-SCNC: 4.3 MMOL/L (ref 3.4–5.3)
PROT SERPL-MCNC: 7.8 G/DL (ref 6.8–8.8)
PSA SERPL-ACNC: 0.74 UG/L (ref 0–4)
SODIUM SERPL-SCNC: 141 MMOL/L (ref 133–144)
TRIGL SERPL-MCNC: 68 MG/DL
TSH SERPL DL<=0.005 MIU/L-ACNC: 0.78 MU/L (ref 0.4–4)

## 2019-04-15 PROCEDURE — 86039 ANTINUCLEAR ANTIBODIES (ANA): CPT | Performed by: FAMILY MEDICINE

## 2019-04-15 PROCEDURE — 36415 COLL VENOUS BLD VENIPUNCTURE: CPT | Performed by: FAMILY MEDICINE

## 2019-04-15 PROCEDURE — 83036 HEMOGLOBIN GLYCOSYLATED A1C: CPT | Performed by: FAMILY MEDICINE

## 2019-04-15 PROCEDURE — 80061 LIPID PANEL: CPT | Performed by: FAMILY MEDICINE

## 2019-04-15 PROCEDURE — 84443 ASSAY THYROID STIM HORMONE: CPT | Performed by: FAMILY MEDICINE

## 2019-04-15 PROCEDURE — 99212 OFFICE O/P EST SF 10 MIN: CPT | Mod: 25 | Performed by: FAMILY MEDICINE

## 2019-04-15 PROCEDURE — 86140 C-REACTIVE PROTEIN: CPT | Performed by: FAMILY MEDICINE

## 2019-04-15 PROCEDURE — 86038 ANTINUCLEAR ANTIBODIES: CPT | Performed by: FAMILY MEDICINE

## 2019-04-15 PROCEDURE — G0103 PSA SCREENING: HCPCS | Performed by: FAMILY MEDICINE

## 2019-04-15 PROCEDURE — 82043 UR ALBUMIN QUANTITATIVE: CPT | Performed by: FAMILY MEDICINE

## 2019-04-15 PROCEDURE — 80053 COMPREHEN METABOLIC PANEL: CPT | Performed by: FAMILY MEDICINE

## 2019-04-15 PROCEDURE — 99396 PREV VISIT EST AGE 40-64: CPT | Performed by: FAMILY MEDICINE

## 2019-04-15 PROCEDURE — 82306 VITAMIN D 25 HYDROXY: CPT | Performed by: FAMILY MEDICINE

## 2019-04-15 ASSESSMENT — MIFFLIN-ST. JEOR: SCORE: 1907.68

## 2019-04-15 NOTE — PROGRESS NOTES
"  SUBJECTIVE:   CC: Yimi Mcqueen is an 63 year old male who presents for preventive health visit.     Healthy Habits:    Do you get at least three servings of calcium containing foods daily (dairy, green leafy vegetables, etc.)? yes    Amount of exercise or daily activities, outside of work: 4 day(s) per week    Problems taking medications regularly No    Medication side effects: No    Have you had an eye exam in the past two years? yes    Do you see a dentist twice per year? yes    Do you have sleep apnea, excessive snoring or daytime drowsiness?no    Diet  Patient is wondering if Jardiance would be a helpful medication for his diabetes.    Cardiovascular  He requires several fasting labs before his next visit with Cardiology, particularly a lipid panel.    Musculoskeletal  Patient reports that his muscles aches have \"reduced somewhat\". He has been stretching routinely which he states has been helpful. He reports having mild swelling in his ankle after riding his bike.      Patient reports waking up once or twice per night to urinate. This has been happening for the last year or two. He states that approximately once per month, he will have difficulty urinating and will have to wait \"for things to loosen up\".    Diet/Exercise  He has been trying to eat more of a vegetarian diet, but does eat some fish. He is concerned about getting enough protein without eating much red or white meat. He has been trying to eat more chickpeas and other plant based sources of protein. Patient has been riding his bike several times per week.        Today's PHQ-2 Score:   PHQ-2 ( 1999 [employer]) 4/15/2019 7/8/2017   Q1: Little interest or pleasure in doing things 0 0   Q2: Feeling down, depressed or hopeless 0 0   PHQ-2 Score 0 0   Q1: Little interest or pleasure in doing things - Not at all   Q2: Feeling down, depressed or hopeless - Not at all   PHQ-2 Score - 0       Abuse: Current or Past(Physical, Sexual or Emotional)- No  Do " "you feel safe in your environment? Yes    Social History     Tobacco Use     Smoking status: Former Smoker     Packs/day: 1.50     Years: 20.00     Pack years: 30.00     Last attempt to quit: 2006     Years since quittin.2     Smokeless tobacco: Never Used   Substance Use Topics     Alcohol use: Yes     Comment: Occ.      If you drink alcohol do you typically have >3 drinks per day or >7 drinks per week? No                      Last PSA:   PSA   Date Value Ref Range Status   2009 0.7 ng/mL        Reviewed orders with patient. Reviewed health maintenance and updated orders accordingly - Yes  Labs reviewed in EPIC  BP Readings from Last 3 Encounters:   04/15/19 106/68   18 118/72   08/10/18 98/62    Wt Readings from Last 3 Encounters:   04/15/19 108.5 kg (239 lb 1.6 oz)   08/10/18 104.9 kg (231 lb 4.8 oz)   18 107.3 kg (236 lb 9.6 oz)                  Patient Active Problem List   Diagnosis     Advance Care Planning     Numerous moles     BMI 38.0-38.9,adult     Impacted cerumen     Hyperlipidemia with target LDL less than 100     Venous (peripheral) insufficiency     ED (erectile dysfunction)     CKD (chronic kidney disease) stage 2, GFR 60-89 ml/min     Hypertension, benign essential, goal below 140/90     Old myocardial infarction     Type 2 diabetes mellitus without complication, without long-term current use of insulin (H)     Past Surgical History:   Procedure Laterality Date     C MUSCLE-SKIN FLAP,LEG  \"       \"     C OPEN RX ANKLE DISLOCATN+FIXATN  ~'05    infected     COLONOSCOPY      repeat 5 yrs       Social History     Tobacco Use     Smoking status: Former Smoker     Packs/day: 1.50     Years: 20.00     Pack years: 30.00     Last attempt to quit: 2006     Years since quittin.2     Smokeless tobacco: Never Used   Substance Use Topics     Alcohol use: Yes     Comment: Occ.      Family History   Problem Relation Age of Onset     Cancer - colorectal Mother      Colon " Cancer Mother      Heart Disease Father          Current Outpatient Medications   Medication Sig Dispense Refill     albuterol (PROAIR HFA/PROVENTIL HFA/VENTOLIN HFA) 108 (90 Base) MCG/ACT Inhaler Inhale 2 puffs into the lungs every 6 hours as needed for shortness of breath / dyspnea or wheezing 1 Inhaler 1     aspirin 81 MG tablet Take  by mouth daily.       azithromycin (ZITHROMAX) 250 MG tablet Two tablets first day, then one tablet daily for four days. 6 tablet 1     blood glucose monitoring (ACCU-CHEK TIERNEY PLUS) test strip 1 strip by In Vitro route 2 times daily       calcium-vitamin D (CALTRATE) 600-400 MG-UNIT per tablet Take 1 tablet by mouth daily        Clopidogrel Bisulfate (PLAVIX PO) Take 75 mg by mouth daily        Coenzyme Q10 (CO Q 10 PO) Take 40 mg by mouth daily       dulaglutide (TRULICITY) 1.5 MG/0.5ML pen Inject 1.5 mg Subcutaneous every 7 days       lisinopril (PRINIVIL/ZESTRIL) 10 MG tablet TAKE ONE TABLET BY MOUTH EVERY DAY 90 tablet 2     metFORMIN (GLUCOPHAGE-XR) 500 MG 24 hr tablet Take 500 mg by mouth 3 times daily (with meals)       metoprolol (TOPROL-XL) 50 MG 24 hr tablet Take 50 mg by mouth daily       Multiple Vitamin (MULTIVITAMINS PO) Take  by mouth daily.       nitroFURantoin, macrocrystal-monohydrate, (MACROBID) 100 MG capsule Take 1 capsule (100 mg) by mouth 2 times daily 14 capsule 0     nitroGLYcerin (NITROSTAT) 0.4 MG sublingual tablet Place 1 tablet (0.4 mg) under the tongue every 5 minutes as needed for chest pain 25 tablet 5     predniSONE (DELTASONE) 20 MG tablet Take 1 tablet (20 mg) by mouth 2 times daily 10 tablet 1     rosuvastatin (CRESTOR) 20 MG tablet Take 1 tablet (20 mg) by mouth daily 90 tablet 3     rosuvastatin (CRESTOR) 40 MG tablet Take 40 mg by mouth daily       sildenafil (REVATIO) 20 MG tablet Take 1 to 3 tabs by mouth as needed for erectile dysfunction 30 tablet 11     No Known Allergies  Recent Labs   Lab Test 12/07/18  1009 05/31/18  1142 02/08/18  "01/06/17  1007 09/19/16  1445  04/10/14  1453   A1C  --  6.2* 7.9* 7.3* 7.1*   < > 8.4*   LDL 54 53  --  63  --    < > 161*   HDL 45 36*  --  38*  --    < > 32*   TRIG 89 71  --  165*  --    < > 161*   ALT 27 31  --  46  --    < > 48   CR  --  0.81 0.79 0.70  --    < > 0.86   GFRESTIMATED  --  >90 97 >90  Non  GFR Calc    --    < > >90   GFRESTBLACK  --  >90 112 >90  African American GFR Calc    --    < > >90   POTASSIUM  --  5.1 4.7 4.9  --    < > 4.1   TSH  --   --   --   --  0.50  --  0.52    < > = values in this interval not displayed.        Reviewed and updated as needed this visit by clinical staff  Tobacco  Allergies  Meds  Med Hx  Surg Hx  Fam Hx  Soc Hx        Reviewed and updated as needed this visit by Provider        Past Medical History:   Diagnosis Date     CKD (chronic kidney disease) stage 2, GFR 60-89 ml/min 10/9/2015     Diabetes (H)      Heart disease      Hypertension      Open left ankle fracture ~'05    infected      Past Surgical History:   Procedure Laterality Date     C MUSCLE-SKIN FLAP,LEG  \"       \"     C OPEN RX ANKLE DISLOCATN+FIXATN  ~'05    infected     COLONOSCOPY  05/12    repeat 5 yrs       ROS:  Denies chest pain, shortness of breath, heartburn, bowel issues. Remainder of ROS is negative unless otherwise noted above or in HPI.    This document serves as a record of the services and decisions personally performed and made by Nabeel Lowe MD. It was created on his behalf by Jeet Sethi, trained medical scribe. The creation of this document is based on the provider's statements to the medical scribe.  Jeet Sethi 8:55 AM April 15, 2019    OBJECTIVE:   /68   Pulse 65   Temp 97.8  F (36.6  C) (Oral)   Ht 1.805 m (5' 11.06\")   Wt 108.5 kg (239 lb 1.6 oz)   SpO2 97%   BMI 33.29 kg/m    EXAM:  GENERAL: healthy, alert and no distress  EYES: Eyes grossly normal to inspection, PERRL and conjunctivae and sclerae normal  HENT: ear canals and TM's normal, " nose and mouth without ulcers or lesions  NECK: no adenopathy, no asymmetry, masses, or scars and thyroid normal to palpation  RESP: lungs clear to auscultation - no rales, rhonchi or wheezes  CV: regular rate and rhythm, normal S1 S2, no S3 or S4, no murmur, click or rub, no peripheral edema and peripheral pulses strong  ABDOMEN: soft, nontender, no hepatosplenomegaly, no masses and bowel sounds normal   (male): normal male genitalia without lesions or urethral discharge, no hernia  RECTAL: normal sphincter tone, no rectal masses, prostate normal size, smooth, nontender without nodules or masses  MS: no gross musculoskeletal defects noted, no edema  SKIN: no suspicious lesions or rashes  NEURO: Normal strength and tone, mentation intact and speech normal, reflexes normal, no tremor  PSYCH: mentation appears normal, affect normal/bright    Diagnostic Test Results:  No results found for this or any previous visit (from the past 24 hour(s)).    ASSESSMENT/PLAN:   (Z00.00) Routine general medical examination at a health care facility  (primary encounter diagnosis)  Comment: Patient is doing well. Routine physical and lab work completed.  Plan: PSA, screen, Vitamin D Deficiency        Will notify with results. Follow up as needed.    (E11.9) Type 2 diabetes mellitus without complication, without long-term current use of insulin (H)  Comment: Pending lab work.  Plan: Hemoglobin A1c, Comprehensive metabolic panel,         Lipid panel reflex to direct LDL Fasting, TSH         with free T4 reflex, Albumin Random Urine         Quantitative with Creat Ratio        Will notify with results. Follow up as needed.    (I10) Hypertension, benign essential, goal below 140/90  Comment: <140/90 at goal.  Plan: Continue taking medication. Follow up as needed.    (I25.2) Old myocardial infarction  Comment: Patient has regular follow ups with cardiology.  Plan: Continue seeing cardiology.    (N18.2) CKD (chronic kidney disease) stage  2, GFR 60-89 ml/min  Comment: Pending lab work.  Plan: Will notify with results. Follow up as needed.    (M79.10) Myalgia, unspecified site  Comment: Pending lab work.  Plan: Anti Nuclear Judy IgG by IFA with Reflex, CRP         inflammation        Will notify with results. Follow up as needed.  Encounter Diagnoses   Name Primary?     Routine general medical examination at a health care facility Yes     Type 2 diabetes mellitus without complication, without long-term current use of insulin (H)      Hypertension, benign essential, goal below 140/90      Old myocardial infarction      CKD (chronic kidney disease) stage 2, GFR 60-89 ml/min      Myalgia, unspecified site      BMI 33.0-33.9,adult        Patient Instructions     Preventive Health Recommendations  Male Ages 50 - 64    Yearly exam:             See your health care provider every year in order to  o   Review health changes.   o   Discuss preventive care.    o   Review your medicines if your doctor has prescribed any.     Have a cholesterol test every 5 years, or more frequently if you are at risk for high cholesterol/heart disease.     Have a diabetes test (fasting glucose) every three years. If you are at risk for diabetes, you should have this test more often.     Have a colonoscopy at age 50, or have a yearly FIT test (stool test). These exams will check for colon cancer.      Talk with your health care provider about whether or not a prostate cancer screening test (PSA) is right for you.    You should be tested each year for STDs (sexually transmitted diseases), if you re at risk.     Shots: Get a flu shot each year. Get a tetanus shot every 10 years.     Nutrition:    Eat at least 5 servings of fruits and vegetables daily.     Eat whole-grain bread, whole-wheat pasta and brown rice instead of white grains and rice.     Get adequate Calcium and Vitamin D.     Lifestyle    Exercise for at least 150 minutes a week (30 minutes a day, 5 days a week). This  "will help you control your weight and prevent disease.     Limit alcohol to one drink per day.     No smoking.     Wear sunscreen to prevent skin cancer.     See your dentist every six months for an exam and cleaning.     See your eye doctor every 1 to 2 years.          COUNSELING:  Reviewed preventive health counseling, as reflected in patient instructions       Regular exercise       Healthy diet/nutrition       Vision screening       Prostate cancer screening    BP Readings from Last 1 Encounters:   04/15/19 106/68     Estimated body mass index is 33.29 kg/m  as calculated from the following:    Height as of this encounter: 1.805 m (5' 11.06\").    Weight as of this encounter: 108.5 kg (239 lb 1.6 oz).    Weight management plan: Discussed healthy diet and exercise guidelines     reports that he quit smoking about 13 years ago. He has a 30.00 pack-year smoking history. He has never used smokeless tobacco.        The information in this document, created by the medical scribe for me, accurately reflects the services I personally performed and the decisions made by me. I have reviewed and approved this document for accuracy prior to leaving the patient care area.  April 15, 2019 8:55 AM    Nabeel Lowe MD  [facility] – [place], [place]  "

## 2019-04-16 LAB
ANA PAT SER IF-IMP: ABNORMAL
ANA SER QL IF: ABNORMAL
ANA TITR SER IF: ABNORMAL {TITER}

## 2019-04-17 NOTE — RESULT ENCOUNTER NOTE
Elvin Geller: Your recent results are mostly normal; the borderline MAXIMO with a normal CRP is unlikely to reflect an active case of arthritis. Please let me know if your symptoms get worse or if you'd like to talk things over with a rheumatologist. In addition, recommend cutting back slightly on vit D supplements. Recommend rechecking slightly elevated bili with next A1c in 4-6 months. Excellent A1c- at goal <7! Contact if questions; nice to see you!     Nabeel

## 2019-05-08 ENCOUNTER — TELEPHONE (OUTPATIENT)
Dept: FAMILY MEDICINE | Facility: CLINIC | Age: 64
End: 2019-05-08

## 2019-05-08 DIAGNOSIS — E56.9 VITAMIN DEFICIENCY: ICD-10-CM

## 2019-05-08 DIAGNOSIS — Z00.00 ROUTINE GENERAL MEDICAL EXAMINATION AT A HEALTH CARE FACILITY: Primary | ICD-10-CM

## 2019-05-08 LAB — VIT B12 SERPL-MCNC: 576 PG/ML (ref 193–986)

## 2019-05-08 PROCEDURE — 36415 COLL VENOUS BLD VENIPUNCTURE: CPT | Performed by: FAMILY MEDICINE

## 2019-05-08 PROCEDURE — 82607 VITAMIN B-12: CPT | Performed by: FAMILY MEDICINE

## 2019-05-08 NOTE — TELEPHONE ENCOUNTER
"Dr. Lowe,  Patient's wife seen at clinic today     Patient's wife states she discussed lab for patient at office visit today related to patient's recent change in diet. Wife mentioned B12; protein and \"a few others\"     Patient currently here for lab only appointment    Cued B12 and protein total order, please advise if you would like others    Please advise    Thank You!  Dyan Freeman, DOTTIE  Triage Nurse        "

## 2019-05-08 NOTE — TELEPHONE ENCOUNTER
Huddled with Dr. Lowe. Verbal orders given for B12 lab draw. Provider states that all other chemistries are normal at this time and he does not need further lab work    Dyan Freeman, RN  Triage Nurse

## 2019-05-09 ENCOUNTER — OFFICE VISIT (OUTPATIENT)
Dept: PSYCHOLOGY | Facility: CLINIC | Age: 64
End: 2019-05-09
Payer: COMMERCIAL

## 2019-05-09 DIAGNOSIS — F43.23 ADJUSTMENT DISORDER WITH MIXED ANXIETY AND DEPRESSED MOOD: Primary | ICD-10-CM

## 2019-05-09 PROCEDURE — 90791 PSYCH DIAGNOSTIC EVALUATION: CPT | Performed by: MARRIAGE & FAMILY THERAPIST

## 2019-05-09 ASSESSMENT — ANXIETY QUESTIONNAIRES
5. BEING SO RESTLESS THAT IT IS HARD TO SIT STILL: SEVERAL DAYS
IF YOU CHECKED OFF ANY PROBLEMS ON THIS QUESTIONNAIRE, HOW DIFFICULT HAVE THESE PROBLEMS MADE IT FOR YOU TO DO YOUR WORK, TAKE CARE OF THINGS AT HOME, OR GET ALONG WITH OTHER PEOPLE: SOMEWHAT DIFFICULT
GAD7 TOTAL SCORE: 5
3. WORRYING TOO MUCH ABOUT DIFFERENT THINGS: SEVERAL DAYS
7. FEELING AFRAID AS IF SOMETHING AWFUL MIGHT HAPPEN: NOT AT ALL
6. BECOMING EASILY ANNOYED OR IRRITABLE: SEVERAL DAYS
1. FEELING NERVOUS, ANXIOUS, OR ON EDGE: SEVERAL DAYS
2. NOT BEING ABLE TO STOP OR CONTROL WORRYING: SEVERAL DAYS

## 2019-05-09 ASSESSMENT — PATIENT HEALTH QUESTIONNAIRE - PHQ9
SUM OF ALL RESPONSES TO PHQ QUESTIONS 1-9: 1
5. POOR APPETITE OR OVEREATING: NOT AT ALL

## 2019-05-09 NOTE — PROGRESS NOTES
Adult Intake Structured Interview  Standard Diagnostic Assessment      CLIENT'S NAME: Yimi Mcqueen  MRN:   [id number]  :   1955  ACCT. NUMBER: [id number]  DATE OF SERVICE: 19      Identifying Information:  Client is a 60 year old, ,  male. Client was referred for counseling by self. Client is currently retired. Client attended the session with his wife Agustin. Yimi and his wife have done some counseling with Agustin Pascual and were referred to this writer.      Client's Statement of Presenting Concern:  Client reports the reason for seeking therapy at this time as needing help navigating difficult family relationships that have been challenging for years.  His stepdaughter Nabil has MI and is partnered with a man that he and his wife Agustin do not like.  They have 2 granddaughters. Their relationship with Nabil and her girls is better than it has been in the past. They also have another daughter Laisha who has had a hx of MI/CD issues.  Laisha continues to be a challenge and stresses their relationship. Client stated that his symptoms have resulted in the following functional impairments: relationship(s)      History of Presenting Concern:  Client reports that these problem(s) began most of their lives but escalated in the last few years. Client has attempted to resolve these concerns in the past through counseling.. Client reports that other professional(s) are not involved in providing support / services. This couple is returning from 2 years ago.      Social History:  Client reported he grew up in [place], [place]. They were the second born of 4 children. This is an intact family and parents remain . Client reported that his childhood was good but overly Methodist and extremely strict. Client described his  current relationships with family of origin as difficult.    Client reported a history of 1 committed relationships or marriages. Client has been  for 35 years. Client reported having 2 children. Client identified some stable and meaningful social connections. Client reported that he has not been involved with the legal system.  Client's highest education level was some college. Client did not identify any learning problems. There are no ethnic, cultural or Muslim factors that may be relevant for therapy. Client identified his preferred language to be English. Client reported he does not need the assistance of an  or other support involved in therapy. Modifications will not be used to assist communication in therapy. Client did not serve in the .     Client reports family history includes Cancer - colorectal in his mother; Colon Cancer in his mother; Heart Disease in his father.    Mental Health History:  Client reported the following biological family members or relatives with mental health issues: Mother experienced Depression and Daughter experienced an Eating disorder.  Client has not been previously diagnosed with a mental health diagnosis.  Client has received the following mental health services in the past: counseling.  Hospitalizations: None.  Client is not currently receiving any mental health services.  Just concluded some couples counseling with Agustin Rosariojustine but more to work on family dynamics.  Says their marriage is solid.  Wife has MS.    Chemical Health History:  Client reported no family history of chemical health issues. Client has not received chemical dependency treatment in the past. Client is not currently receiving any chemical dependency treatment. Client reports no problems as a result of their drinking / drug use.      Client Reports:  Client reports using alcohol 1 times per month and has 1 beers at a time. Client first started drinking at age 17.  Client  denies using tobacco.  Client denies using marijuana.  Client reports using caffeine 1 times per day and drinks 1 at a time. Client started using caffeine at age 19.  Client denies using street drugs.  Client denies the non-medical use of prescription or over the counter drugs.    CAGE: None of the patient's responses to the CAGE screening were positive / Negative CAGE score   Based on the negative Cage-Aid score and clinical interview there  are not indications of drug or alcohol abuse.    Discussed the general effects of drugs and alcohol on health and well-being. Therapist gave client printed information about the effects of chemical use on his health and well being.      Significant Losses / Trauma / Abuse / Neglect Issues:  There are no indications or report of: significant losses, trauma, abuse or neglect.    Issues of possible neglect are not present.      Medical Issues:  Client has had a physical exam to rule out medical causes for current symptoms. Date of last physical exam was within the past year. Client was encouraged to follow up with PCP if symptoms were to develop. The client has a [facility] Provider, who is named Nabeel Lowe.. The client reports not having a psychiatrist. Client reports no current medical concerns. The client denies the presence of chronic or episodic pain. There are not significant nutritional concerns. Client and his wife are working on healthy eating and have both lost 20-25 pounds and will continue until they've met their goal.    Client reports current meds as:   Current Outpatient Medications   Medication Sig     albuterol (PROAIR HFA/PROVENTIL HFA/VENTOLIN HFA) 108 (90 Base) MCG/ACT Inhaler Inhale 2 puffs into the lungs every 6 hours as needed for shortness of breath / dyspnea or wheezing     aspirin 81 MG tablet Take  by mouth daily.     azithromycin (ZITHROMAX) 250 MG tablet Two tablets first day, then one tablet daily for four days.     blood glucose  monitoring (ACCU-CHEK TIERNEY PLUS) test strip 1 strip by In Vitro route 2 times daily     calcium-vitamin D (CALTRATE) 600-400 MG-UNIT per tablet Take 1 tablet by mouth daily      Clopidogrel Bisulfate (PLAVIX PO) Take 75 mg by mouth daily      Coenzyme Q10 (CO Q 10 PO) Take 40 mg by mouth daily     dulaglutide (TRULICITY) 1.5 MG/0.5ML pen Inject 1.5 mg Subcutaneous every 7 days     lisinopril (PRINIVIL/ZESTRIL) 10 MG tablet TAKE ONE TABLET BY MOUTH EVERY DAY     metFORMIN (GLUCOPHAGE-XR) 500 MG 24 hr tablet Take 500 mg by mouth 3 times daily (with meals)     metoprolol (TOPROL-XL) 50 MG 24 hr tablet Take 50 mg by mouth daily     Multiple Vitamin (MULTIVITAMINS PO) Take  by mouth daily.     nitroFURantoin, macrocrystal-monohydrate, (MACROBID) 100 MG capsule Take 1 capsule (100 mg) by mouth 2 times daily     nitroGLYcerin (NITROSTAT) 0.4 MG sublingual tablet Place 1 tablet (0.4 mg) under the tongue every 5 minutes as needed for chest pain     predniSONE (DELTASONE) 20 MG tablet Take 1 tablet (20 mg) by mouth 2 times daily     rosuvastatin (CRESTOR) 20 MG tablet Take 1 tablet (20 mg) by mouth daily     rosuvastatin (CRESTOR) 40 MG tablet Take 40 mg by mouth daily     sildenafil (REVATIO) 20 MG tablet Take 1 to 3 tabs by mouth as needed for erectile dysfunction     No current facility-administered medications for this visit.        Client Allergies:  No Known Allergies  no known allergies to medications    Medical History:  Past Medical History:   Diagnosis Date     BMI 33.0-33.9,adult 4/16/2019     CKD (chronic kidney disease) stage 2, GFR 60-89 ml/min 10/9/2015     Diabetes (H)      Heart disease      Hypertension      Open left ankle fracture ~'05    infected         Medication Adherence:  N/A - Client does not have prescribed psychiatric medications.    Client was provided recommendation to follow-up with prescribing physician.    Mental Status Assessment:  Appearance:   Appropriate   Eye Contact:   Good    Psychomotor Behavior: Normal   Attitude:   Cooperative   Orientation:   All  Speech   Rate / Production: Normal    Volume:  Normal   Mood:    Normal  Affect:    Appropriate   Thought Content:  Clear   Thought Form:  Coherent  Logical   Insight:    Good       Review of Symptoms:  Depression: Sleep Energy  Hillary:  No symptoms  Psychosis: No symptoms  Anxiety: Worries  Panic:  No symptoms  Post Traumatic Stress Disorder: No symptoms  Obsessive Compulsive Disorder: No symptoms  Eating Disorder: No symptoms  Oppositional Defiant Disorder: No symptoms  ADD / ADHD: No symptoms  Conduct Disorder: No symptoms        Safety Issues and Plan for Safety and Risk Management:  Client denies a history of suicidal ideation, suicide attempts, self-injurious behavior, homicidal ideation, homicidal behavior and and other safety concerns  Client denies current fears or concerns for personal safety.  Client denies current or recent suicidal ideation or behaviors.  Client denies current or recent homicidal ideation or behaviors.  Client denies current or recent self injurious behavior or ideation.  Client denies other safety concerns.  Client reports there are no firearms in the house.  A safety and risk management plan has not been developed at this time, however client was given the after-hours number / 911 should there be a change in any of these risk factors.      Patient's Strengths and Limitations:  Client identified the following strengths or resources that will help him succeed in counseling: Cheondoism, commitment to health and well being, lavern / spirituality, friends / good social support, family support, intelligence and sense of humor. Client identified the following supports: family and friends. Things that may interfere with the clients success in counseling include:nothing.      Diagnostic Criteria:  A. The development of emotional or behavioral symptoms in response to an identifiable stressor(s) occurring within 3 months of  the onset of the stressor(s)  B. These symptoms or behaviors are clinically significant, as evidenced by one or both of the following:  C. The stress-related disturbance does not meet criteria for another disorder & is not not an exacerbation of another mental disorder  D. The symptoms do not represent normal bereavement  E. Once the stressor or its consequences have terminated, the symptoms do not persist for more than an additional 6 months      Functional Status:  Client's symptoms have caused reduced functional status in the following areas: Social / Relational - with daughter Nabil and her family.      DSM5 Diagnoses: (Sustained by DSM5 Criteria Listed Above)  Diagnoses: Adjustment Disorders  309.28 (F43.23) With mixed anxiety and depressed mood  Psychosocial & Contextual Factors: Discord with stepdaughter  WHODAS 2.0 (12 item)            This questionnaire asks about difficulties due to health conditions. Health conditions  include  disease or illnesses, other health problems that may be short or long lasting,  injuries, mental health or emotional problems, and problems with alcohol or drugs.                     Think back over the past 30 days and answer these questions, thinking about how much  difficulty you had doing the following activities. For each question, please [place] only  one response.    S1 Standing for long periods such as 30 minutes? None =         1   S2 Taking care of household responsibilities? None =         1   S3 Learning a new task, for example, learning how to get to a new place? None =         1   S4 How much of a problem do you have joining community activities (for example, festivals, Gnosticism or other activities) in the same way as anyone else can? None =         1   S5 How much have you been emotionally affected by your health problems? None =         1     In the past 30 days, how much difficulty did you have in:   S6 Concentrating on doing something for ten minutes? None =          1   S7 Walking a long distance such as a kilometer (or equivalent)? None =         1   S8 Washing your whole body? None =         1   S9 Getting dressed? None =         1   S10 Dealing with people you do not know? None =         1   S11 Maintaining a friendship? None =         1   S12 Your day to day work? None =         1     H1 Overall, in the past 30 days, how many days were these difficulties present? Record number of days 0   H2 In the past 30 days, for how many days were you totally unable to carry out your usual activities or work because of any health condition? Record number of days  0   H3 In the past 30 days, not counting the days that you were totally unable, for how many days did you cut back or reduce your usual activities or work because of any health condition? Record number of days 0     Attendance Agreement:  Client has signed Attendance Agreement:Yes      Preliminary Treatment Plan:  The client reports no currently identified Christian, ethnic or cultural issues relevant to therapy.     services are not indicated.    Modifications to assist communication are not indicated.    The concerns identified by the client will be addressed in therapy.    Initial Treatment will focus on: Adjustment Difficulties related to: family concerns.    As a preliminary treatment goal, client will develop coping/problem-solving skills to facilitate more adaptive adjustment.    The focus of initial interventions will be to process losses and provide family education.     Collaboration with other professionals is not indicated at this time.    Referral to another professional/service is not indicated at this time..    A Release of Information is not needed at this time.    Report to child / adult protection services was NA.    Client will have access to their Located within [facility]' medical record.    Rhoda Cao  May 9, 2019

## 2019-05-10 ASSESSMENT — ANXIETY QUESTIONNAIRES: GAD7 TOTAL SCORE: 5

## 2019-05-10 NOTE — RESULT ENCOUNTER NOTE
Elvin Geller, your recent vitamin B12 result is normal. Please contact if any questions. Good luck on your bike ride!  Nabeel

## 2019-06-01 ENCOUNTER — TRANSFERRED RECORDS (OUTPATIENT)
Dept: MULTI SPECIALTY CLINIC | Facility: CLINIC | Age: 64
End: 2019-06-01

## 2019-06-01 LAB — RETINOPATHY: NORMAL

## 2019-06-05 ENCOUNTER — OFFICE VISIT (OUTPATIENT)
Dept: PSYCHOLOGY | Facility: CLINIC | Age: 64
End: 2019-06-05
Payer: COMMERCIAL

## 2019-06-05 DIAGNOSIS — F43.23 ADJUSTMENT DISORDER WITH MIXED ANXIETY AND DEPRESSED MOOD: Primary | ICD-10-CM

## 2019-06-05 PROCEDURE — 90847 FAMILY PSYTX W/PT 50 MIN: CPT | Performed by: MARRIAGE & FAMILY THERAPIST

## 2019-06-05 ASSESSMENT — ANXIETY QUESTIONNAIRES
1. FEELING NERVOUS, ANXIOUS, OR ON EDGE: NOT AT ALL
5. BEING SO RESTLESS THAT IT IS HARD TO SIT STILL: NOT AT ALL
7. FEELING AFRAID AS IF SOMETHING AWFUL MIGHT HAPPEN: NOT AT ALL
3. WORRYING TOO MUCH ABOUT DIFFERENT THINGS: NOT AT ALL
IF YOU CHECKED OFF ANY PROBLEMS ON THIS QUESTIONNAIRE, HOW DIFFICULT HAVE THESE PROBLEMS MADE IT FOR YOU TO DO YOUR WORK, TAKE CARE OF THINGS AT HOME, OR GET ALONG WITH OTHER PEOPLE: NOT DIFFICULT AT ALL
GAD7 TOTAL SCORE: 1
2. NOT BEING ABLE TO STOP OR CONTROL WORRYING: NOT AT ALL
6. BECOMING EASILY ANNOYED OR IRRITABLE: SEVERAL DAYS

## 2019-06-05 ASSESSMENT — PATIENT HEALTH QUESTIONNAIRE - PHQ9
SUM OF ALL RESPONSES TO PHQ QUESTIONS 1-9: 1
5. POOR APPETITE OR OVEREATING: NOT AT ALL

## 2019-06-05 NOTE — PROGRESS NOTES
Progress Note    Client Name: Yimi Mcqueen  Date: 6/5/2019         Service Type: Family with client present  Video Visit: No     Session Start Time: 11AM  Session End Time: 11:45AM     Session Length: 45    Session #: 2    Attendees: Client and Spouse / Significant Other     Treatment Plan Last Reviewed: Next session   PHQ-9 / HENRI-7 : Each session    DATA  Interactive Complexity: No  Crisis: No       Progress Since Last Session (Related to Symptoms / Goals / Homework):   Symptoms: Worsening marital discord    Homework: Did not complete      Episode of Care Goals: No improvement - PREPARATION (Decided to change - considering how); Intervened by negotiating a change plan and determining options / strategies for behavior change, identifying triggers, exploring social supports, and working towards setting a date to begin behavior change     Current / Ongoing Stressors and Concerns:   Couple are arguing because Agustin is stonewalling Yimi and accusing him of infidelity.They also have ongoing conflict about Laisha. Encouraged Agustin to ask for reassurance and not shut Yimi out. Encouraged Yimi to validate and try doing something different than normally.     Treatment Objective(s) Addressed in This Session:   practice the use of timeouts  Work on asking for what they need, take time outs but return to talk in a timely fashion.     Intervention:   relationship coaching        ASSESSMENT: Current Emotional / Mental Status (status of significant symptoms):   Risk status (Self / Other harm or suicidal ideation)   Client denies current fears or concerns for personal safety.   Client denies current or recent suicidal ideation or behaviors.   Client denies current or recent homicidal ideation or behaviors.   Client denies current or recent self injurious behavior or ideation.   Client denies other safety concerns.   Client Client reports there has been no change in risk  factors since their last session.     Client Client reports there has been no change in protective factors since their last session.     A safety and risk management plan has not been developed at this time, however client was given the after-hours number / 911 should there be a change in any of these risk factors.     Appearance:   Appropriate    Eye Contact:   Good    Psychomotor Behavior: Normal    Attitude:   Cooperative    Orientation:   All   Speech    Rate / Production: Normal     Volume:  Normal    Mood:    Irritable    Affect:    Appropriate    Thought Content:  Clear    Thought Form:  Coherent  Logical    Insight:    Good      Medication Review:   No current psychiatric medications prescribed     Medication Compliance:   NA     Changes in Health Issues:   None reported     Chemical Use Review:   Substance Use: Chemical use reviewed, no active concerns identified      Tobacco Use: No current tobacco use.      Diagnosis:  1. Adjustment disorder with mixed anxiety and depressed mood        Collateral Reports Completed:   Not Applicable    PLAN: (Client Tasks / Therapist Tasks / Other)  Homework: Couple to work on taking time outs, Yimi to validate and Agustin to ask for comfort and reassurance, talk less about Laisha.        Rhoda Cao                                                         ______________________________________________________________________

## 2019-06-06 ASSESSMENT — ANXIETY QUESTIONNAIRES: GAD7 TOTAL SCORE: 1

## 2019-06-07 ENCOUNTER — ALLIED HEALTH/NURSE VISIT (OUTPATIENT)
Dept: NURSING | Facility: CLINIC | Age: 64
End: 2019-06-07
Payer: COMMERCIAL

## 2019-06-07 DIAGNOSIS — Z48.02 ENCOUNTER FOR REMOVAL OF SUTURES: Primary | ICD-10-CM

## 2019-06-07 PROCEDURE — 99207 ZZC NO CHARGE NURSE ONLY: CPT

## 2019-06-07 NOTE — NURSING NOTE
Pt arrived for suture removal on thumb, requested provider assess area, Dr. Pandya advised pt wait for removal until Monday as the tip of thumb has some area of possible non viable tissue and the extra days may offer time, pt was instructed to keep the area dry to air, cover if needs to protect from bumping and dirt,but mostly keep open to air  Pt verbalized instruction    Pamella Cavazos RN   [facility]

## 2019-06-10 ENCOUNTER — ALLIED HEALTH/NURSE VISIT (OUTPATIENT)
Dept: NURSING | Facility: CLINIC | Age: 64
End: 2019-06-10
Payer: COMMERCIAL

## 2019-06-10 DIAGNOSIS — I10 HYPERTENSION, BENIGN ESSENTIAL, GOAL BELOW 140/90: ICD-10-CM

## 2019-06-10 DIAGNOSIS — Z48.02 ENCOUNTER FOR REMOVAL OF SUTURES: Primary | ICD-10-CM

## 2019-06-10 PROCEDURE — 99207 ZZC NO CHARGE NURSE ONLY: CPT

## 2019-06-10 RX ORDER — LISINOPRIL 10 MG/1
TABLET ORAL
Qty: 90 TABLET | Refills: 2 | Status: SHIPPED | OUTPATIENT
Start: 2019-06-10 | End: 2020-03-11

## 2019-06-10 NOTE — TELEPHONE ENCOUNTER
"Requested Prescriptions   Pending Prescriptions Disp Refills     lisinopril (PRINIVIL/ZESTRIL) 10 MG tablet [Pharmacy Med Name: LISINOPRIL 10MG TABS]  Last Written Prescription Date:  09/18/2018  Last Fill Quantity: 90,  # refills: 2   Last office visit: 4/15/2019 with prescribing provider:  04/15/2019   Future Office Visit:   Next 5 appointments (look out 90 days)    Jun 19, 2019 11:00 AM CDT  Return Visit with [facility] ([facility]) [address]  [address]  [place] [address]  [phone]   Jul 02, 2019 11:00 AM CDT  Return Visit with [facility] [place] ([facility] [place]) [address]  [address]  [place] [address]  [phone]        90 tablet 2     Sig: TAKE ONE TABLET BY MOUTH EVERY DAY       ACE Inhibitors (Including Combos) Protocol Passed - 6/10/2019 10:27 AM        Passed - Blood pressure under 140/90 in past 12 months     BP Readings from Last 3 Encounters:   04/15/19 106/68   11/07/18 118/72   08/10/18 98/62                 Passed - Recent (12 mo) or future (30 days) visit within the authorizing provider's specialty     Patient had office visit in the last 12 months or has a visit in the next 30 days with authorizing provider or within the authorizing provider's specialty.  See \"Patient Info\" tab in inbasket, or \"Choose Columns\" in Meds & Orders section of the refill encounter.              Passed - Medication is active on med list        Passed - Patient is age 18 or older        Passed - Normal serum creatinine on file in past 12 months     Recent Labs   Lab Test 04/15/19  1034   CR 0.74             Passed - Normal serum potassium on file in past 12 months     Recent Labs   Lab Test 04/15/19  1034   POTASSIUM 4.3             "

## 2019-06-10 NOTE — NURSING NOTE
Yimi Mcqueen presents to the clinic today for removal of sutures.  The patient has had the sutures in place for 12 days.  There has been no history of infection or drainage.  10 sutures are seen located on the right thumb.  The wound is healing with no signs of infection, but there is some tissue that appears it may not be viable, site was assessed by Dr. Pandya he gave verbal order to remove all sutures.  Tetanus status is up to date.   All sutures were easily removed after soak with NS today.  Routine wound care discussed.  The patient will follow up as needed.  Pt advised to keep open to air when able, keep clean and dry, cover as needed to not bump area    Pamella Cavazos RN   [facility]

## 2019-06-10 NOTE — TELEPHONE ENCOUNTER
Prescription approved per [facility] [place].    Pamella Cavazos RN   [facility]

## 2019-06-19 ENCOUNTER — OFFICE VISIT (OUTPATIENT)
Dept: PSYCHOLOGY | Facility: CLINIC | Age: 64
End: 2019-06-19
Payer: COMMERCIAL

## 2019-06-19 DIAGNOSIS — F43.23 ADJUSTMENT DISORDER WITH MIXED ANXIETY AND DEPRESSED MOOD: Primary | ICD-10-CM

## 2019-06-19 PROCEDURE — 90847 FAMILY PSYTX W/PT 50 MIN: CPT | Performed by: MARRIAGE & FAMILY THERAPIST

## 2019-06-19 NOTE — PROGRESS NOTES
Progress Note    Client Name: Yimi Mcqueen  Date: 6/19/2019         Service Type: Family with client present  Video Visit: No     Session Start Time: 11AM  Session End Time: 11:45AM     Session Length: 45    Session #: 3    Attendees: Client and Spouse / Significant Other     Treatment Plan Last Reviewed: 6/19/2019  PHQ-9 / HENRI-7 : Each session    DATA  Interactive Complexity: No  Crisis: No       Progress Since Last Session (Related to Symptoms / Goals / Homework):   Symptoms: Worsening marital discord    Homework: Did not complete      Episode of Care Goals: No improvement - PREPARATION (Decided to change - considering how); Intervened by negotiating a change plan and determining options / strategies for behavior change, identifying triggers, exploring social supports, and working towards setting a date to begin behavior change     Current / Ongoing Stressors and Concerns:   Couple are better and more on the same page. Processed the need for Agustin to have better self care and stress management strategies. Encouraged Yimi that he can resist opposing her to help with stress. Agustin to exercise, get a stress ball to squeeze, find other things to use her hands for, etc.       Treatment Objective(s) Addressed in This Session:   practice the use of timeouts  Work on asking for what they need, take time outs but return to talk in a timely fashion.     Intervention:   relationship coaching        ASSESSMENT: Current Emotional / Mental Status (status of significant symptoms):   Risk status (Self / Other harm or suicidal ideation)   Client denies current fears or concerns for personal safety.   Client denies current or recent suicidal ideation or behaviors.   Client denies current or recent homicidal ideation or behaviors.   Client denies current or recent self injurious behavior or ideation.   Client denies other safety concerns.   Client Client reports there has been no  change in risk factors since their last session.     Client Client reports there has been no change in protective factors since their last session.     A safety and risk management plan has not been developed at this time, however client was given the after-hours number / 911 should there be a change in any of these risk factors.     Appearance:   Appropriate    Eye Contact:   Good    Psychomotor Behavior: Normal    Attitude:   Cooperative    Orientation:   All   Speech    Rate / Production: Normal     Volume:  Normal    Mood:    Irritable    Affect:    Appropriate    Thought Content:  Clear    Thought Form:  Coherent  Logical    Insight:    Good      Medication Review:   No current psychiatric medications prescribed     Medication Compliance:   NA     Changes in Health Issues:   None reported     Chemical Use Review:   Substance Use: Chemical use reviewed, no active concerns identified      Tobacco Use: No current tobacco use.      Diagnosis:  1. Adjustment disorder with mixed anxiety and depressed mood        Collateral Reports Completed:   Not Applicable    PLAN: (Client Tasks / Therapist Tasks / Other)  Homework: Yimi to validate and Agustin to ask for comfort and reassurance, talk less about Laisha.        Rhoda Neumann [facility]                                                         ______________________________________________________________________                                                                                            Treatment Plan    Client's Name: Yimi Mcqueen  YOB: 1955    Date: 6/19/2019    DSM-V Diagnoses: 309.28 - Adjustment Disorder with Mixed Anxiety and Depressed Mood By History; V71.09 - No Diagnosis  Psychosocial / Contextual Factors: Parenting with wife their adult daughter with addiction and felony  WHODAS: 12    Referral / Collaboration:  Referral to another professional/service is not indicated at this time..    Anticipated number of session or this  episode of care: 10      MeasurableTreatment Goal(s) related to diagnosis / functional impairment(s)  Goal 1: Client will lower HENRI 7 and PHQ 9 scores to 3 or below.    I will know I've met my goal when I'm less anxious and depressed due to our circumstances.      Objective #A (Client Action)    Client will learn healthy boundaries with adult children..  Status: Continued - Date(s): 6/19/2019     Intervention(s)  Therapist will teach about healthy boundaries. with adult children.    Objective #B  Client will work to be on the same page with wife..  Status: Continued - Date(s): 6/19/2019    Intervention(s)  Therapist will teach strategies to align goals..    Objective #C  Client will give daughter Laisha expectations with bottom lines..  Status: New - Date: 6/19/2019     Intervention(s)  Therapist will help determine bottom line..        Client has reviewed and agreed to the above plan.      Rhoda Cao  June 19, 2019

## 2019-06-21 ENCOUNTER — TRANSFERRED RECORDS (OUTPATIENT)
Dept: HEALTH INFORMATION MANAGEMENT | Facility: CLINIC | Age: 64
End: 2019-06-21

## 2019-07-02 ENCOUNTER — OFFICE VISIT (OUTPATIENT)
Dept: PSYCHOLOGY | Facility: CLINIC | Age: 64
End: 2019-07-02
Payer: COMMERCIAL

## 2019-07-02 DIAGNOSIS — F43.23 ADJUSTMENT DISORDER WITH MIXED ANXIETY AND DEPRESSED MOOD: Primary | ICD-10-CM

## 2019-07-02 PROCEDURE — 90847 FAMILY PSYTX W/PT 50 MIN: CPT | Performed by: MARRIAGE & FAMILY THERAPIST

## 2019-07-02 NOTE — PROGRESS NOTES
Progress Note    Client Name: Yimi Mcqueen  Date: 7/2/2019         Service Type: Family with client present    Video Visit: No     Session Start Time: 11AM  Session End Time: 11:45AM     Session Length: 45    Session #: 4    Attendees: Client and Spouse / Significant Other     Treatment Plan Last Reviewed: 6/19/2019  PHQ-9 / HENRI-7 : Each session    DATA  Interactive Complexity: No  Crisis: No       Progress Since Last Session (Related to Symptoms / Goals / Homework):   Symptoms: Improving greater understanding between couple    Homework: Partially completed      Episode of Care Goals: Satisfactory progress - ACTION (Actively working towards change); Intervened by reinforcing change plan / affirming steps taken     Current / Ongoing Stressors and Concerns:   Couple are better communicating and understanding one another. Also have installed a security system that helps Agustin feel safer. Yimi also realizes he cannot be angry when Agustin talks about Laisha. Therapist referred Agustin back to individual therapy to work on coping skills. Processed the need for Agustin to have better self care and stress management strategies. Encouraged Yimi that he can resist opposing her to help with stress. Agustin to exercise, get a stress ball to squeeze, find other things to use her hands for, etc. Yimi to let go of needing to understand why Agustin is so connected to Laisha. He will never understand the mother/child attachment.       Treatment Objective(s) Addressed in This Session:   practice the use of timeouts  Work on asking for what they need, take time outs but return to talk in a timely fashion.     Intervention:   relationship coaching        ASSESSMENT: Current Emotional / Mental Status (status of significant symptoms):   Risk status (Self / Other harm or suicidal ideation)   Client denies current fears or concerns for personal safety.   Client denies current or recent  suicidal ideation or behaviors.   Client denies current or recent homicidal ideation or behaviors.   Client denies current or recent self injurious behavior or ideation.   Client denies other safety concerns.   Client Client reports there has been no change in risk factors since their last session.     Client Client reports there has been no change in protective factors since their last session.     A safety and risk management plan has not been developed at this time, however client was given the after-hours number / 911 should there be a change in any of these risk factors.     Appearance:   Appropriate    Eye Contact:   Good    Psychomotor Behavior: Normal    Attitude:   Cooperative    Orientation:   All   Speech    Rate / Production: Normal     Volume:  Normal    Mood:    Normal   Affect:    Appropriate    Thought Content:  Clear    Thought Form:  Coherent  Logical    Insight:    Good      Medication Review:   No current psychiatric medications prescribed     Medication Compliance:   NA     Changes in Health Issues:   None reported     Chemical Use Review:   Substance Use: Chemical use reviewed, no active concerns identified      Tobacco Use: No current tobacco use.      Diagnosis:  HENRI    Collateral Reports Completed:   Not Applicable    PLAN: (Client Tasks / Therapist Tasks / Other)  Homework: Yimi to validate and Agustin to ask for comfort and reassurance, talk less about Nickie Velez to schedule individual therapy.        Rhoda Cao                                                         ______________________________________________________________________                                                                                            Treatment Plan    Client's Name: Yimi Mcqueen  YOB: 1955    Date: 6/19/2019    DSM-V Diagnoses: 309.28 - Adjustment Disorder with Mixed Anxiety and Depressed Mood By History; V71.09 - No Diagnosis  Psychosocial / Contextual Factors:  Parenting with wife their adult daughter with addiction and felony  WHODAS: 12    Referral / Collaboration:  Referral to another professional/service is not indicated at this time..    Anticipated number of session or this episode of care: 10      MeasurableTreatment Goal(s) related to diagnosis / functional impairment(s)  Goal 1: Client will lower HENRI 7 and PHQ 9 scores to 3 or below.    I will know I've met my goal when I'm less anxious and depressed due to our circumstances.      Objective #A (Client Action)    Client will learn healthy boundaries with adult children..  Status: Continued - Date(s): 6/19/2019     Intervention(s)  Therapist will teach about healthy boundaries. with adult children.    Objective #B  Client will work to be on the same page with wife..  Status: Continued - Date(s): 6/19/2019    Intervention(s)  Therapist will teach strategies to align goals..    Objective #C  Client will give daughter Laisha expectations with bottom lines..  Status: New - Date: 6/19/2019     Intervention(s)  Therapist will help determine bottom line..        Client has reviewed and agreed to the above plan.      Rhoda Cao  June 19, 2019

## 2019-07-10 ENCOUNTER — OFFICE VISIT (OUTPATIENT)
Dept: PHARMACY | Facility: CLINIC | Age: 64
End: 2019-07-10
Payer: COMMERCIAL

## 2019-07-10 VITALS
SYSTOLIC BLOOD PRESSURE: 118 MMHG | WEIGHT: 236 LBS | OXYGEN SATURATION: 98 % | HEART RATE: 66 BPM | DIASTOLIC BLOOD PRESSURE: 78 MMHG | BODY MASS INDEX: 32.86 KG/M2

## 2019-07-10 DIAGNOSIS — E11.9 TYPE 2 DIABETES MELLITUS WITHOUT COMPLICATION, WITHOUT LONG-TERM CURRENT USE OF INSULIN (H): Primary | ICD-10-CM

## 2019-07-10 DIAGNOSIS — I10 HYPERTENSION, BENIGN ESSENTIAL, GOAL BELOW 140/90: ICD-10-CM

## 2019-07-10 DIAGNOSIS — I25.728 CORONARY ARTERY DISEASE OF AUTOLOGOUS BYPASS GRAFT WITH STABLE ANGINA PECTORIS (H): ICD-10-CM

## 2019-07-10 DIAGNOSIS — E78.5 HYPERLIPIDEMIA WITH TARGET LDL LESS THAN 100: ICD-10-CM

## 2019-07-10 DIAGNOSIS — I24.9 ACS (ACUTE CORONARY SYNDROME) (H): ICD-10-CM

## 2019-07-10 PROCEDURE — 99605 MTMS BY PHARM NP 15 MIN: CPT | Performed by: PHARMACIST

## 2019-07-10 PROCEDURE — 99607 MTMS BY PHARM ADDL 15 MIN: CPT | Performed by: PHARMACIST

## 2019-07-10 NOTE — Clinical Note
Dr. Lowe--jovan--we saw rochelle today --he is doing well --no main issues --I encouraged to try my intermittent fasting + low carb diet --he states he will consider trying this after his [employer] race --ill let you kn ow if he starts it.Thx.Anand Jenkins, [facility].Medication Therapy Management [phone] BjerkePharm-D4.

## 2019-07-10 NOTE — PROGRESS NOTES
SUBJECTIVE/OBJECTIVE:                                                    Yimi Mcqueen is a 63 year old male coming in for a follow-up (6-27-18) visit for Medication Therapy Management.  He was referred to me from -MT program.     Chief Complaint:  Here for annual meds/labs review.  Patient states he feels better on a new med regimen plus diet changes.    Questions about diabetes medications - Trulicity - Has seen ads for other options. Heart health benefit.   Insurance coverage through medicare, what will it be like compared to current insurance. Especially with his current medication therapy.     Personal Healthcare Goals: lose another 10 lbs -20lbs.(230 is his Goal.)   Plan - cards wants him on meditarranean diet   He's a gourmet cook, organic garden , frustrated with nutrition classes --waste of time --what other options do we have for him ?  He's retired now as well.   5:52 PM   Allergies/ADRs: Reviewed in Epic  Tobacco: History of tobacco dependence - quit 16 years ago.  Alcohol: Less than 1 beverage / month  Caffeine: 1 cups/day of coffee  Activity: bike -100 miles /week x 3-5 years. 4,000  Miles/year.  PMH: Reviewed in Epic; DM x 3 years now;  Recent MI sept. 2016, 2 stents 10-10-16    Medication Adherence: issues found and discussed below and sets up own med boxes    Type -2 DM: metformin- ER 500mg tid now(2 in am and 1 pm ) , trulicity 1.5mg/week.     SMBG:          Lab Results   Component Value Date    A1C 6.1 04/15/2019    A1C 6.2 05/31/2018    A1C 7.9 02/08/2018    A1C 7.3 01/06/2017    A1C 7.1 09/19/2016          He has been trying a meditearrean diet and working out more to lose weight, but he has been frustrated by lack of success. He would like advice on how to change diet to lose weight.   Brown rice, sweet potato, fruit, veggies, fish    His cards md wants him to try meditearrean diet --he is on a veggies/ fruits diet , avoiding more red meat -just monthly now. Chicken qod, fish weekly .  "    Sugary desserts cause him leg cramps.     He bikes a lot daily for exercise now.     He is concerned with how medicare will impact his insurance coverage of his trulicity. We let him know that it depends on the plan he selects when he enrolls. Discussed plans with donut hole vs non-donut hole higher premium plans. Recommended he and his wife speak to health partners to identify plans that will be accomodating of their conditions.    ACS:  MI (2016)-- 2 coronary artery drug eleuting stents -now takes clopidogrel 75mg./day (off   On metoprolol daily but not needing any sl-nitroglycerin .       Hyperlipidemia: Current therapy includes: rosuvastatin 20mg./day + co-q-10 - 40mg./day and exercise.  Pt reports no SE's now.   Recent Labs   Lab Test 04/15/19  1034 12/07/18  1009  09/25/15  1118 09/26/14  1145   CHOL 124 117   < > 237* 202*   HDL 48 45   < > 39* 34*   LDL 62 54   < > 145* 138*   TRIG 68 89   < > 265* 149   CHOLHDLRATIO  --   --   --  6.1* 5.9*    < > = values in this interval not displayed.           Hypertension: Current medications include Lisinopril 10mg qam and metoprolol 50mg er tab hs .  Patient does self-monitor BP. Home BP monitoring in range of 120's systolic over 70's diastolic.pulse range 65-68.  Patient reports no current medication side effects.    BP Readings from Last 3 Encounters:   07/10/19 118/78   04/15/19 106/68   11/07/18 118/72               BP Readings from Last 1 Encounters:   07/10/19 118/78     Pulse Readings from Last 1 Encounters:   07/10/19 66     Wt Readings from Last 1 Encounters:   07/10/19 236 lb (107 kg)     Ht Readings from Last 1 Encounters:   04/15/19 5' 11.06\" (1.805 m)     Estimated body mass index is 32.86 kg/m  as calculated from the following:    Height as of 4/15/19: 5' 11.06\" (1.805 m).    Weight as of this encounter: 236 lb (107 kg).    Temp Readings from Last 1 Encounters:   04/15/19 97.8  F (36.6  C) (Oral)         ASSESSMENT:                                   "                     Current medications were reviewed with him today.     Medication Adherence: no  Issues now.       Diabetes: Stable: Patient is meeting A1c goal of < 7%. Self monitoring of blood glucose is not at goal of fasting  mg/dL and post prandial < 150 mg/dL. Pt would benefit from minimum SMBG: Check blood sugars fasting, and occasionally 2 hours after starting a meal.   Metformin :  stay on the same dose.  GLP-1 agonist (Trulcity) :  Stay on same weekly dose.  Increased exercise--keep riding bike !  Updated Hemoglobin [id number] 6.1%.   Weight loss recommended--thru low carb diet and added glp-1 med.we had long discussion today about IF + low carb diet --he states he and wife are sort of doing this and he wants to do more of this but will discuss with his wife(she has MS and epilepsy). See plan for details.       ACS: stable    Hyperlipidemia: stable. Pt is on high intensity statin which is indicated based on 2013 ACC/AHA guidelines for lipid management.  .    Hypertension: Stable. Patient is meeting BP goal of < 140/90mmHg.        PLAN:                                                      1. Insulin is your body's fat storage hormone. When you eat meals high in carbohydrates your pancreas releases insulin to store the excess food energy as fat. By intermittent fasting, or eating all of your daily food in an 8 hour period while fasting the remaining 16, you reduce the amount of insulin your body is releasing, resulting in less storage of food energy as fat. Focusing on a diet lower as low in carbohydrate as you can tolerate will also help reduce the amount of insulin your pancreas releases. Try these changes when you are ready after your bike ride.     2. When you feel hungry, try drinking a zero calorie liquid, like flavored water, first. If you still feel hungry, especially if it is outside of your eating window, try eating a low carbohydrate snack like pickles.      3. Check out [employer]  "or \"The Diabetes Code\" by Dr. Coy for more details.    4. Reach out to an  with your wife to discuss which medicare plan would be right for the two of you with your current medications. They are best positioned to help you navigate the complicated insurance market.        Next [facility] visit: Send me a [employer] message if/when you start an intermittent fasting diet. I will then schedule an appointment for you 90 days from then to check in to see how it goes for you.     I spent 50 minutes with this patient today.  All changes were made via collaborative practice agreement with Nabeel Lowe. A copy of the visit note was provided to the patient's primary care provider.        The patient was sent via [employer] a summary of these recommendations as an after visit summary.     Anand Jenkins Rph.  Medication Therapy Management Provider  [phone]  Asael Dinh  Pharm[facility].     "

## 2019-07-10 NOTE — PATIENT INSTRUCTIONS
"Recommendations from today's MTM visit:                                                      1. Insulin is your body's fat storage hormone. When you eat meals high in carbohydrates your pancreas releases insulin to store the excess food energy as fat. By intermittent fasting, or eating all of your daily food in an 8 hour period while fasting the remaining 16, you reduce the amount of insulin your body is releasing, resulting in less storage of food energy as fat. Focusing on a diet lower as low in carbohydrate as you can tolerate will also help reduce the amount of insulin your pancreas releases. Try these changes when you are ready after your bike ride.     2. When you feel hungry, try drinking a zero calorie liquid, like flavored water, first. If you still feel hungry, especially if it is outside of your eating window, try eating a low carbohydrate snack like pickles.      3. Check out [facility] or \"The Diabetes Code\" by Dr. Coy for more details.    4. Reach out to an  with your wife to discuss which medicare plan would be right for the two of you with your current medications. They are best positioned to help you navigate the complicated insurance market.        Next MTM visit: Send me a [employer] message if/when you start an intermittent fasting diet. I will then schedule an appointment for you 90 days from then to check in to see how it goes for you.     To schedule another MTM appointment, please call the clinic directly or you may call the MTM scheduling line at [phone] or toll-free at [phone].     My Clinical Pharmacist's contact information:                                                      It was a pleasure talking with you today!  Please feel free to contact me with any questions or concerns you have.      Anand Jenkins [facility].  Medication Therapy Management Provider  [phone]  [facility]  [facility]arm[facility].     You may receive a survey about the MT services you received " by email and/or US Mail.  I would appreciate your feedback to help me serve you better in the future. Your comments will be anonymous.

## 2019-07-30 ENCOUNTER — OFFICE VISIT (OUTPATIENT)
Dept: PSYCHOLOGY | Facility: CLINIC | Age: 64
End: 2019-07-30
Payer: COMMERCIAL

## 2019-07-30 DIAGNOSIS — F43.23 ADJUSTMENT DISORDER WITH MIXED ANXIETY AND DEPRESSED MOOD: Primary | ICD-10-CM

## 2019-07-30 PROCEDURE — 90847 FAMILY PSYTX W/PT 50 MIN: CPT | Performed by: MARRIAGE & FAMILY THERAPIST

## 2019-08-01 NOTE — PROGRESS NOTES
Progress Note    Client Name: Yimi Mcqueen  Date: 7/30/2019         Service Type: Family with client present    Video Visit: No     Session Start Time: 11AM  Session End Time: 11:45AM     Session Length: 45    Session #: 5    Attendees: Client and Spouse / Significant Other     Treatment Plan Last Reviewed: 6/19/2019  PHQ-9 / HENRI-7 : Each session    DATA  Interactive Complexity: No  Crisis: No       Progress Since Last Session (Related to Symptoms / Goals / Homework):   Symptoms: Improving greater understanding between couple    Homework: Partially completed      Episode of Care Goals: Satisfactory progress - ACTION (Actively working towards change); Intervened by reinforcing change plan / affirming steps taken     Current / Ongoing Stressors and Concerns:   Client's wife to focus on facts versus feelings regarding her insecurity with Yimi. Move forward on letter to Laisha regarding debt collection.       Treatment Objective(s) Addressed in This Session:   practice the use of timeouts  Work on asking for what they need, take time outs but return to talk in a timely fashion.     Intervention:   relationship coaching        ASSESSMENT: Current Emotional / Mental Status (status of significant symptoms):   Risk status (Self / Other harm or suicidal ideation)   Client denies current fears or concerns for personal safety.   Client denies current or recent suicidal ideation or behaviors.   Client denies current or recent homicidal ideation or behaviors.   Client denies current or recent self injurious behavior or ideation.   Client denies other safety concerns.   Client Client reports there has been no change in risk factors since their last session.     Client Client reports there has been no change in protective factors since their last session.     A safety and risk management plan has not been developed at this time, however client was given the after-hours number /  911 should there be a change in any of these risk factors.     Appearance:   Appropriate    Eye Contact:   Good    Psychomotor Behavior: Normal    Attitude:   Cooperative    Orientation:   All   Speech    Rate / Production: Normal     Volume:  Normal    Mood:    Normal   Affect:    Appropriate    Thought Content:  Clear    Thought Form:  Coherent  Logical    Insight:    Good      Medication Review:   No current psychiatric medications prescribed     Medication Compliance:   NA     Changes in Health Issues:   None reported     Chemical Use Review:   Substance Use: Chemical use reviewed, no active concerns identified      Tobacco Use: No current tobacco use.      Diagnosis:  HENRI    Collateral Reports Completed:   Not Applicable    PLAN: (Client Tasks / Therapist Tasks / Other)  Homework: Yimi to write letter to Laisha and consider ways to deliver it.        Rhoda Cao                                                         ______________________________________________________________________                                                                                            Treatment Plan    Client's Name: Yimi Mcqueen  YOB: 1955    Date: 6/19/2019    DSM-V Diagnoses: 309.28 - Adjustment Disorder with Mixed Anxiety and Depressed Mood By History; V71.09 - No Diagnosis  Psychosocial / Contextual Factors: Parenting with wife their adult daughter with addiction and felony  WHODAS: 12    Referral / Collaboration:  Referral to another professional/service is not indicated at this time..    Anticipated number of session or this episode of care: 10      MeasurableTreatment Goal(s) related to diagnosis / functional impairment(s)  Goal 1: Client will lower HENRI 7 and PHQ 9 scores to 3 or below.    I will know I've met my goal when I'm less anxious and depressed due to our circumstances.      Objective #A (Client Action)    Client will learn healthy boundaries with adult  children..  Status: Continued - Date(s): 6/19/2019     Intervention(s)  Therapist will teach about healthy boundaries. with adult children.    Objective #B  Client will work to be on the same page with wife..  Status: Continued - Date(s): 6/19/2019    Intervention(s)  Therapist will teach strategies to align goals..    Objective #C  Client will give daughter Laisha expectations with bottom lines..  Status: New - Date: 6/19/2019     Intervention(s)  Therapist will help determine bottom line..        Client has reviewed and agreed to the above plan.      Rhoda Cao  June 19, 2019

## 2019-08-09 ENCOUNTER — DOCUMENTATION ONLY (OUTPATIENT)
Dept: OTHER | Facility: CLINIC | Age: 64
End: 2019-08-09

## 2019-08-13 ENCOUNTER — OFFICE VISIT (OUTPATIENT)
Dept: PSYCHOLOGY | Facility: CLINIC | Age: 64
End: 2019-08-13
Payer: COMMERCIAL

## 2019-08-13 DIAGNOSIS — F43.23 ADJUSTMENT DISORDER WITH MIXED ANXIETY AND DEPRESSED MOOD: Primary | ICD-10-CM

## 2019-08-13 PROCEDURE — 90847 FAMILY PSYTX W/PT 50 MIN: CPT | Performed by: MARRIAGE & FAMILY THERAPIST

## 2019-08-14 ENCOUNTER — DOCUMENTATION ONLY (OUTPATIENT)
Dept: OTHER | Facility: CLINIC | Age: 64
End: 2019-08-14

## 2019-08-14 NOTE — PROGRESS NOTES
Progress Note    Client Name: Yimi Mcqueen  Date: 8/13/2019         Service Type: Family with client present    Video Visit: No     Session Start Time: 11AM  Session End Time: 11:45AM     Session Length: 45    Session #: 6    Attendees: Client and Spouse / Significant Other     Treatment Plan Last Reviewed: 6/19/2019  PHQ-9 / HENRI-7 : Each session    DATA  Interactive Complexity: No  Crisis: No       Progress Since Last Session (Related to Symptoms / Goals / Homework):   Symptoms: Improving greater understanding between couple    Homework: Partially completed      Episode of Care Goals: Satisfactory progress - ACTION (Actively working towards change); Intervened by reinforcing change plan / affirming steps taken     Current / Ongoing Stressors and Concerns:   Client's wife to consider individual therapy since it is hard for client  to listen to her sad feelings about her daughter. Therapist to provide referrals.     Treatment Objective(s) Addressed in This Session:   practice the use of timeouts  Work on asking for what they need, take time outs but return to talk in a timely fashion.     Intervention:   relationship coaching        ASSESSMENT: Current Emotional / Mental Status (status of significant symptoms):   Risk status (Self / Other harm or suicidal ideation)   Client denies current fears or concerns for personal safety.   Client denies current or recent suicidal ideation or behaviors.   Client denies current or recent homicidal ideation or behaviors.   Client denies current or recent self injurious behavior or ideation.   Client denies other safety concerns.   Client Client reports there has been no change in risk factors since their last session.     Client Client reports there has been no change in protective factors since their last session.     A safety and risk management plan has not been developed at this time, however client was given the after-hours  number / 911 should there be a change in any of these risk factors.     Appearance:   Appropriate    Eye Contact:   Good    Psychomotor Behavior: Normal    Attitude:   Cooperative    Orientation:   All   Speech    Rate / Production: Normal     Volume:  Normal    Mood:    Normal   Affect:    Appropriate    Thought Content:  Clear    Thought Form:  Coherent  Logical    Insight:    Good      Medication Review:   No current psychiatric medications prescribed     Medication Compliance:   NA     Changes in Health Issues:   None reported     Chemical Use Review:   Substance Use: Chemical use reviewed, no active concerns identified      Tobacco Use: No current tobacco use.      Diagnosis:  HENRI    Collateral Reports Completed:   Not Applicable    PLAN: (Client Tasks / Therapist Tasks / Other)  Homework: Client to support his wife in considering individual therapy.         Rhoda Thien Nash                                                         ______________________________________________________________________                                                                                            Treatment Plan    Client's Name: Yimi Mcqueen  YOB: 1955    Date: 6/19/2019    DSM-V Diagnoses: 309.28 - Adjustment Disorder with Mixed Anxiety and Depressed Mood By History; V71.09 - No Diagnosis  Psychosocial / Contextual Factors: Parenting with wife their adult daughter with addiction and felony  WHODAS: 12    Referral / Collaboration:  Referral to another professional/service is not indicated at this time..    Anticipated number of session or this episode of care: 10      MeasurableTreatment Goal(s) related to diagnosis / functional impairment(s)  Goal 1: Client will lower HENRI 7 and PHQ 9 scores to 3 or below.    I will know I've met my goal when I'm less anxious and depressed due to our circumstances.      Objective #A (Client Action)    Client will learn healthy boundaries with adult  children..  Status: Continued - Date(s): 6/19/2019     Intervention(s)  Therapist will teach about healthy boundaries. with adult children.    Objective #B  Client will work to be on the same page with wife..  Status: Continued - Date(s): 6/19/2019    Intervention(s)  Therapist will teach strategies to align goals..    Objective #C  Client will give daughter Laisha expectations with bottom lines..  Status: New - Date: 6/19/2019     Intervention(s)  Therapist will help determine bottom line..        Client has reviewed and agreed to the above plan.      Rhoda Cao  June 19, 2019

## 2019-09-04 ENCOUNTER — OFFICE VISIT (OUTPATIENT)
Dept: PSYCHOLOGY | Facility: CLINIC | Age: 64
End: 2019-09-04
Payer: COMMERCIAL

## 2019-09-04 DIAGNOSIS — F43.23 ADJUSTMENT DISORDER WITH MIXED ANXIETY AND DEPRESSED MOOD: Primary | ICD-10-CM

## 2019-09-04 PROCEDURE — 90847 FAMILY PSYTX W/PT 50 MIN: CPT | Performed by: MARRIAGE & FAMILY THERAPIST

## 2019-09-05 NOTE — PROGRESS NOTES
Progress Note    Client Name: Yimi Mcqueen  Date: 9/3/2019         Service Type: Family with client present    Video Visit: No     Session Start Time: 11AM  Session End Time: 11:45AM     Session Length: 45    Session #: 7    Attendees: Client and Spouse / Significant Other     Treatment Plan Last Reviewed: 6/19/2019  PHQ-9 / HENRI-7 : Each session    DATA  Interactive Complexity: No  Crisis: No       Progress Since Last Session (Related to Symptoms / Goals / Homework):   Symptoms: Improving greater understanding between couple    Homework: Partially completed      Episode of Care Goals: Satisfactory progress - ACTION (Actively working towards change); Intervened by reinforcing change plan / affirming steps taken     Current / Ongoing Stressors and Concerns:   Client's wife to continue to consider individual therapy since it is hard for client  to listen to her sad feelings about her daughter. Couple to take time outs before escalation occurs. Resist similar patterns of conflict.     Treatment Objective(s) Addressed in This Session:   practice the use of timeouts  Work on asking for what they need, take time outs but return to talk in a timely fashion.     Intervention:   relationship coaching        ASSESSMENT: Current Emotional / Mental Status (status of significant symptoms):   Risk status (Self / Other harm or suicidal ideation)   Client denies current fears or concerns for personal safety.   Client denies current or recent suicidal ideation or behaviors.   Client denies current or recent homicidal ideation or behaviors.   Client denies current or recent self injurious behavior or ideation.   Client denies other safety concerns.   Client Client reports there has been no change in risk factors since their last session.     Client Client reports there has been no change in protective factors since their last session.     A safety and risk management plan has not been  developed at this time, however client was given the after-hours number / 911 should there be a change in any of these risk factors.     Appearance:   Appropriate    Eye Contact:   Good    Psychomotor Behavior: Normal    Attitude:   Cooperative    Orientation:   All   Speech    Rate / Production: Normal     Volume:  Normal    Mood:    Normal   Affect:    Appropriate    Thought Content:  Clear    Thought Form:  Coherent  Logical    Insight:    Good      Medication Review:   No current psychiatric medications prescribed     Medication Compliance:   NA     Changes in Health Issues:   None reported     Chemical Use Review:   Substance Use: Chemical use reviewed, no active concerns identified      Tobacco Use: No current tobacco use.      Diagnosis:  HENRI    Collateral Reports Completed:   Not Applicable    PLAN: (Client Tasks / Therapist Tasks / Other)  Homework: Client to support his wife in considering individual therapy. Meet once a month.        JILLIAN Medrano                                                         ______________________________________________________________________                                                                                            Treatment Plan    Client's Name: Yimi Mcqueen  YOB: 1955    Date: 6/19/2019    DSM-V Diagnoses: 309.28 - Adjustment Disorder with Mixed Anxiety and Depressed Mood By History; V71.09 - No Diagnosis  Psychosocial / Contextual Factors: Parenting with wife their adult daughter with addiction and felony  WHODAS: 12    Referral / Collaboration:  Referral to another professional/service is not indicated at this time..    Anticipated number of session or this episode of care: 10      MeasurableTreatment Goal(s) related to diagnosis / functional impairment(s)  Goal 1: Client will lower HENRI 7 and PHQ 9 scores to 3 or below.    I will know I've met my goal when I'm less anxious and depressed due to our circumstances.       Objective #A (Client Action)    Client will learn healthy boundaries with adult children..  Status: Continued - Date(s): 6/19/2019     Intervention(s)  Therapist will teach about healthy boundaries. with adult children.    Objective #B  Client will work to be on the same page with wife..  Status: Continued - Date(s): 6/19/2019    Intervention(s)  Therapist will teach strategies to align goals..    Objective #C  Client will give daughter Laisha expectations with bottom lines..  Status: New - Date: 6/19/2019     Intervention(s)  Therapist will help determine bottom line..        Client has reviewed and agreed to the above plan.      Rhoda Cao  June 19, 2019

## 2019-10-02 ENCOUNTER — HEALTH MAINTENANCE LETTER (OUTPATIENT)
Age: 64
End: 2019-10-02

## 2019-10-30 ENCOUNTER — HEALTH MAINTENANCE LETTER (OUTPATIENT)
Age: 64
End: 2019-10-30

## 2019-12-17 ENCOUNTER — TELEPHONE (OUTPATIENT)
Dept: FAMILY MEDICINE | Facility: CLINIC | Age: 64
End: 2019-12-17

## 2019-12-17 DIAGNOSIS — E11.9 TYPE 2 DIABETES MELLITUS WITHOUT COMPLICATION, WITHOUT LONG-TERM CURRENT USE OF INSULIN (H): ICD-10-CM

## 2019-12-17 DIAGNOSIS — E56.9 VITAMIN DEFICIENCY: ICD-10-CM

## 2019-12-17 DIAGNOSIS — N18.2 CKD (CHRONIC KIDNEY DISEASE) STAGE 2, GFR 60-89 ML/MIN: ICD-10-CM

## 2019-12-17 DIAGNOSIS — I25.2 OLD MYOCARDIAL INFARCTION: Primary | ICD-10-CM

## 2019-12-17 DIAGNOSIS — E78.5 HYPERLIPIDEMIA WITH TARGET LDL LESS THAN 100: ICD-10-CM

## 2019-12-17 NOTE — TELEPHONE ENCOUNTER
Dr. Lowe,    Spoke with patients wife regarding labs and appointments for them. I have cued up labs per patients wife that are needed, as well as, an EKG. Please advise if you would like any other labs added. Patient and his wife are going to both see you on 2/19/19 for labs and appointment    Thanks  Clau Jackson RN   [facility]

## 2020-01-07 ENCOUNTER — OFFICE VISIT (OUTPATIENT)
Dept: PSYCHOLOGY | Facility: CLINIC | Age: 65
End: 2020-01-07
Payer: COMMERCIAL

## 2020-01-07 DIAGNOSIS — F43.23 ADJUSTMENT DISORDER WITH MIXED ANXIETY AND DEPRESSED MOOD: Primary | ICD-10-CM

## 2020-01-07 PROCEDURE — 90847 FAMILY PSYTX W/PT 50 MIN: CPT | Performed by: MARRIAGE & FAMILY THERAPIST

## 2020-01-08 ASSESSMENT — ANXIETY QUESTIONNAIRES
7. FEELING AFRAID AS IF SOMETHING AWFUL MIGHT HAPPEN: NOT AT ALL
3. WORRYING TOO MUCH ABOUT DIFFERENT THINGS: NOT AT ALL
1. FEELING NERVOUS, ANXIOUS, OR ON EDGE: NOT AT ALL
6. BECOMING EASILY ANNOYED OR IRRITABLE: SEVERAL DAYS
2. NOT BEING ABLE TO STOP OR CONTROL WORRYING: NOT AT ALL
IF YOU CHECKED OFF ANY PROBLEMS ON THIS QUESTIONNAIRE, HOW DIFFICULT HAVE THESE PROBLEMS MADE IT FOR YOU TO DO YOUR WORK, TAKE CARE OF THINGS AT HOME, OR GET ALONG WITH OTHER PEOPLE: NOT DIFFICULT AT ALL
5. BEING SO RESTLESS THAT IT IS HARD TO SIT STILL: NOT AT ALL
GAD7 TOTAL SCORE: 1

## 2020-01-08 ASSESSMENT — PATIENT HEALTH QUESTIONNAIRE - PHQ9
SUM OF ALL RESPONSES TO PHQ QUESTIONS 1-9: 1
5. POOR APPETITE OR OVEREATING: NOT AT ALL

## 2020-01-08 NOTE — PROGRESS NOTES
"                                           Progress Note    Client Name: Yimi Mcqueen  Date: 1/7/2020         Service Type: Family with client present    Video Visit: No     Session Start Time: 11AM  Session End Time: 11:45AM     Session Length: 45    Session #: 8    Attendees: Client and Spouse / Significant Other     Treatment Plan Last Reviewed: 1/7/2020  PHQ-9 / HENRI-7 : Each session    DATA  Interactive Complexity: No  Crisis: No       Progress Since Last Session (Related to Symptoms / Goals / Homework):   Symptoms: Worsening increased conflict    Homework: Did not complete      Episode of Care Goals: No improvement - RELAPSE (Returned to unhealthy behavior); Intervened by reassessing readiness to change and identifying appropriate stage.  Identified reasons for relapse, successes, and change talk     Current / Ongoing Stressors and Concerns:   Client and his wife continue to engage in the dysfunctional dance over their daughter Laisha. Client's wife continues to have worry over her daughter and will be quiet and somber at times at home. This is upsetting for client who blames all of his wife's depressive sx on their daughter with whom he has no regard. He would like his wife to adopt the same attitude but the wife is unable as she states she is a mother and will never completely be able to \"put her in the rear view mirror\" as client is able to do. She would like understanding from her  on this but he gets angry which makes her angry. She feels she has to be as angry as he is and she is not. They continue to engage in circular conflict about these issues. Therapist is recommending they each do individual therapy to gain greater support and understanding of their issues.     Treatment Objective(s) Addressed in This Session:   practice the use of timeouts  Work on asking for what they need, take time outs but return to talk in a timely fashion.  Agree to not force their opinions on one another- allow " for them to each have their own thoughts and feelings     Intervention:   relationship coaching        ASSESSMENT: Current Emotional / Mental Status (status of significant symptoms):   Risk status (Self / Other harm or suicidal ideation)   Client denies current fears or concerns for personal safety.   Client denies current or recent suicidal ideation or behaviors.   Client denies current or recent homicidal ideation or behaviors.   Client denies current or recent self injurious behavior or ideation.   Client denies other safety concerns.   Client Client reports there has been no change in risk factors since their last session.     Client Client reports there has been no change in protective factors since their last session.     A safety and risk management plan has not been developed at this time, however client was given the after-hours number / 911 should there be a change in any of these risk factors.     Appearance:   Appropriate    Eye Contact:   Good    Psychomotor Behavior: Normal    Attitude:   Cooperative    Orientation:   All   Speech    Rate / Production: Normal     Volume:  Normal    Mood:    Normal   Affect:    Appropriate    Thought Content:  Clear    Thought Form:  Coherent  Logical    Insight:    Good      Medication Review:   No current psychiatric medications prescribed     Medication Compliance:   NA     Changes in Health Issues:   None reported     Chemical Use Review:   Substance Use: Chemical use reviewed, no active concerns identified      Tobacco Use: No current tobacco use.      Diagnosis:  HENRI    Collateral Reports Completed:   Not Applicable    PLAN: (Client Tasks / Therapist Tasks / Other)  Homework: Client and wife to reconsider individual therapy. Wife to discontinue threatening divorce.        JILLIAN Medrano                                                         ______________________________________________________________________                                                                                             Treatment Plan    Client's Name: Yimi Mcqueen  YOB: 1955    Date: 1/7/2020    DSM-V Diagnoses: 309.28 - Adjustment Disorder with Mixed Anxiety and Depressed Mood By History; V71.09 - No Diagnosis  Psychosocial / Contextual Factors: Parenting with wife their adult daughter with addiction and felony  WHODAS: 12    Referral / Collaboration:  Referral to another professional/service is not indicated at this time..    Anticipated number of session or this episode of care: 10      MeasurableTreatment Goal(s) related to diagnosis / functional impairment(s)  Goal 1: Client will lower HENRI 7 and PHQ 9 scores to 3 or below.    I will know I've met my goal when I'm less anxious and depressed due to our circumstances.      Objective #A (Client Action)    Client will learn healthy boundaries with adult children..  Status: Completed - Date: 1/7/2020     Intervention(s)  Therapist will teach about healthy boundaries. with adult children.    Objective #B  Client will work to be on the same page with wife..  Status: Continued - Date(s):1/7/2020     Intervention(s)  Therapist will teach strategies to align goals..    Objective #C  Client will give daughter Laisha expectations with bottom lines..  Status: Continued - Date(s):1/7/2020     Intervention(s)  Therapist will help determine bottom line..        Client has reviewed and agreed to the above plan.      Rhoda Cao  January 7, 2020

## 2020-01-09 ASSESSMENT — ANXIETY QUESTIONNAIRES: GAD7 TOTAL SCORE: 1

## 2020-01-23 ENCOUNTER — TELEPHONE (OUTPATIENT)
Dept: FAMILY MEDICINE | Facility: CLINIC | Age: 65
End: 2020-01-23

## 2020-01-23 NOTE — TELEPHONE ENCOUNTER
Panel Management Review      Patient has the following on his problem list:     Diabetes    ASA: Passed    Last A1C  Lab Results   Component Value Date    A1C 6.1 04/15/2019    A1C 6.2 05/31/2018    A1C 7.9 02/08/2018    A1C 7.3 01/06/2017    A1C 7.1 09/19/2016     A1C tested: FAILED    Last LDL:    Lab Results   Component Value Date    CHOL 124 04/15/2019     Lab Results   Component Value Date    HDL 48 04/15/2019     Lab Results   Component Value Date    LDL 62 04/15/2019     Lab Results   Component Value Date    TRIG 68 04/15/2019     Lab Results   Component Value Date    CHOLHDLRATIO 6.1 09/25/2015     Lab Results   Component Value Date    NHDL 76 04/15/2019       Is the patient on a Statin? YES             Is the patient on Aspirin? YES    Medications     HMG CoA Reductase Inhibitors     rosuvastatin (CRESTOR) 20 MG tablet       Salicylates     aspirin 81 MG tablet             Last three blood pressure readings:  BP Readings from Last 3 Encounters:   07/10/19 118/78   04/15/19 106/68   11/07/18 118/72       Date of last diabetes office visit: 04/15/2019     Tobacco History:     History   Smoking Status     Former Smoker     Packs/day: 1.50     Years: 20.00     Quit date: 1/1/2006   Smokeless Tobacco     Never Used         Hypertension   Last three blood pressure readings:  BP Readings from Last 3 Encounters:   07/10/19 118/78   04/15/19 106/68   11/07/18 118/72     Blood pressure: Passed    HTN Guidelines:  Less than 140/90      Composite cancer screening  Chart review shows that this patient is due/due soon for the following None  Summary:    Patient is due/failing the following:   EYE EXAM, A1C and PHYSICAL    Action needed:   Patient needs office visit for wellness visit, and diabetes follow up. and Patient needs referral/order: ophthalmology    Type of outreach:    Sent [employer] message. Patient already has appointment on 2/19/2020    Questions for provider review:    None                                                                                                                                     Jeannie Persaud CMA.       Chart routed to none.

## 2020-02-19 ENCOUNTER — OFFICE VISIT (OUTPATIENT)
Dept: FAMILY MEDICINE | Facility: CLINIC | Age: 65
End: 2020-02-19
Payer: COMMERCIAL

## 2020-02-19 ENCOUNTER — APPOINTMENT (OUTPATIENT)
Dept: LAB | Facility: CLINIC | Age: 65
End: 2020-02-19
Payer: COMMERCIAL

## 2020-02-19 VITALS
HEART RATE: 60 BPM | TEMPERATURE: 97.9 F | OXYGEN SATURATION: 97 % | SYSTOLIC BLOOD PRESSURE: 100 MMHG | DIASTOLIC BLOOD PRESSURE: 70 MMHG

## 2020-02-19 DIAGNOSIS — E78.5 HYPERLIPIDEMIA WITH TARGET LDL LESS THAN 100: ICD-10-CM

## 2020-02-19 DIAGNOSIS — E11.9 TYPE 2 DIABETES MELLITUS WITHOUT COMPLICATION, WITHOUT LONG-TERM CURRENT USE OF INSULIN (H): Primary | ICD-10-CM

## 2020-02-19 DIAGNOSIS — I10 HYPERTENSION, BENIGN ESSENTIAL, GOAL BELOW 140/90: ICD-10-CM

## 2020-02-19 LAB
ALBUMIN SERPL-MCNC: 4.2 G/DL (ref 3.4–5)
ALP SERPL-CCNC: 47 U/L (ref 40–150)
ALT SERPL W P-5'-P-CCNC: 35 U/L (ref 0–70)
ANION GAP SERPL CALCULATED.3IONS-SCNC: 6 MMOL/L (ref 3–14)
AST SERPL W P-5'-P-CCNC: 24 U/L (ref 0–45)
BILIRUB SERPL-MCNC: 1.2 MG/DL (ref 0.2–1.3)
BUN SERPL-MCNC: 18 MG/DL (ref 7–30)
CALCIUM SERPL-MCNC: 9.6 MG/DL (ref 8.5–10.1)
CHLORIDE SERPL-SCNC: 107 MMOL/L (ref 94–109)
CHOLEST SERPL-MCNC: 145 MG/DL
CO2 SERPL-SCNC: 26 MMOL/L (ref 20–32)
CREAT SERPL-MCNC: 0.8 MG/DL (ref 0.66–1.25)
CREAT UR-MCNC: 111 MG/DL
ERYTHROCYTE [DISTWIDTH] IN BLOOD BY AUTOMATED COUNT: 13.9 % (ref 10–15)
GFR SERPL CREATININE-BSD FRML MDRD: >90 ML/MIN/{1.73_M2}
GLUCOSE SERPL-MCNC: 117 MG/DL (ref 70–99)
HBA1C MFR BLD: 5.9 % (ref 0–5.6)
HCT VFR BLD AUTO: 45.8 % (ref 40–53)
HDLC SERPL-MCNC: 54 MG/DL
HGB BLD-MCNC: 15.3 G/DL (ref 13.3–17.7)
LDLC SERPL CALC-MCNC: 72 MG/DL
MCH RBC QN AUTO: 28.5 PG (ref 26.5–33)
MCHC RBC AUTO-ENTMCNC: 33.4 G/DL (ref 31.5–36.5)
MCV RBC AUTO: 85 FL (ref 78–100)
MICROALBUMIN UR-MCNC: 10 MG/L
MICROALBUMIN/CREAT UR: 9.37 MG/G CR (ref 0–17)
NONHDLC SERPL-MCNC: 91 MG/DL
PLATELET # BLD AUTO: 135 10E9/L (ref 150–450)
POTASSIUM SERPL-SCNC: 4.5 MMOL/L (ref 3.4–5.3)
PROT SERPL-MCNC: 7.4 G/DL (ref 6.8–8.8)
RBC # BLD AUTO: 5.36 10E12/L (ref 4.4–5.9)
SODIUM SERPL-SCNC: 139 MMOL/L (ref 133–144)
TRIGL SERPL-MCNC: 96 MG/DL
WBC # BLD AUTO: 5.5 10E9/L (ref 4–11)

## 2020-02-19 PROCEDURE — 99214 OFFICE O/P EST MOD 30 MIN: CPT | Performed by: FAMILY MEDICINE

## 2020-02-19 PROCEDURE — 83036 HEMOGLOBIN GLYCOSYLATED A1C: CPT | Performed by: FAMILY MEDICINE

## 2020-02-19 PROCEDURE — 36415 COLL VENOUS BLD VENIPUNCTURE: CPT | Performed by: FAMILY MEDICINE

## 2020-02-19 PROCEDURE — 82043 UR ALBUMIN QUANTITATIVE: CPT | Performed by: FAMILY MEDICINE

## 2020-02-19 PROCEDURE — 80061 LIPID PANEL: CPT | Performed by: FAMILY MEDICINE

## 2020-02-19 PROCEDURE — 80053 COMPREHEN METABOLIC PANEL: CPT | Performed by: FAMILY MEDICINE

## 2020-02-19 PROCEDURE — 85027 COMPLETE CBC AUTOMATED: CPT | Performed by: FAMILY MEDICINE

## 2020-02-19 NOTE — PROGRESS NOTES
Subjective     Yimi Mcqueen is a 64 year old male who presents to clinic today for the following health issues:    HPI     Chronic Illnesses Management:  Patient is present for diabetes, hypertension and hyperlipidemia management. Patient is confident that his chronic conditions are stable. His only major concern is a relatively recent decrease in the speed of his urine flow. He inquires if his increased consumption of green tea could be the cause of this problem.     Diabetes Follow-up    How often are you checking your blood sugar? One time daily  What time of day are you checking your blood sugars (select all that apply)?  Before meals  Have you had any blood sugars above 200?  No  Have you had any blood sugars below 70?  No    What symptoms do you notice when your blood sugar is low?  None    What concerns do you have today about your diabetes? None     Do you have any of these symptoms? (Select all that apply)  Numbness in feet    Have you had a diabetic eye exam in the last 12 months? Yes- Date of last eye exam:  8 months ago [place] [facility],  Location: [facility] [facility]            Hyperlipidemia Follow-Up      Are you regularly taking any medication or supplement to lower your cholesterol?   Yes- taking medication daily     Are you having muscle aches or other side effects that you think could be caused by your cholesterol lowering medication?  No    Hypertension Follow-up      Do you check your blood pressure regularly outside of the clinic? No     Are you following a low salt diet? Yes    Are your blood pressures ever more than 140 on the top number (systolic) OR more   than 90 on the bottom number (diastolic), for example 140/90? No    BP Readings from Last 2 Encounters:   02/19/20 100/70   07/10/19 118/78     Hemoglobin A1C (%)   Date Value   02/19/2020 5.9 (H)   04/15/2019 6.1 (H)     LDL Cholesterol Calculated (mg/dL)   Date Value   04/15/2019 62   12/07/2018 54       How many  "servings of fruits and vegetables do you eat daily?  4 or more    On average, how many sweetened beverages do you drink each day (Examples: soda, juice, sweet tea, etc.  Do NOT count diet or artificially sweetened beverages)?   0    How many days per week do you exercise enough to make your heart beat faster? 4    How many minutes a day do you exercise enough to make your heart beat faster? 60 or more    How many days per week do you miss taking your medication? 0    Patient Active Problem List   Diagnosis     Numerous moles     Hyperlipidemia with target LDL less than 100     Venous (peripheral) insufficiency     ED (erectile dysfunction)     CKD (chronic kidney disease) stage 2, GFR 60-89 ml/min     Hypertension, benign essential, goal below 140/90     Old myocardial infarction     Type 2 diabetes mellitus without complication, without long-term current use of insulin (H)     Myalgia, unspecified site     BMI 33.0-33.9,adult     Past Surgical History:   Procedure Laterality Date     C MUSCLE-SKIN FLAP,LEG  \"       \"     C OPEN RX ANKLE DISLOCATN+FIXATN  ~'05    infected     COLONOSCOPY      repeat 5 yrs       Social History     Tobacco Use     Smoking status: Former Smoker     Packs/day: 1.50     Years: 20.00     Pack years: 30.00     Last attempt to quit: 2006     Years since quittin.1     Smokeless tobacco: Never Used   Substance Use Topics     Alcohol use: Yes     Comment: Occ.      Family History   Problem Relation Age of Onset     Cancer - colorectal Mother      Colon Cancer Mother      Heart Disease Father          Current Outpatient Medications   Medication Sig Dispense Refill     aspirin 81 MG tablet Take  by mouth daily.       blood glucose monitoring (ACCU-CHEK TIERNEY PLUS) test strip 1 strip by In Vitro route 2 times daily       calcium-vitamin D (CALTRATE) 600-400 MG-UNIT per tablet Take 1 tablet by mouth daily        lisinopril (PRINIVIL/ZESTRIL) 10 MG tablet TAKE ONE TABLET BY MOUTH " EVERY DAY 90 tablet 2     metFORMIN (GLUCOPHAGE-XR) 500 MG 24 hr tablet Take 500 mg by mouth 3 times daily (with meals)       metoprolol (TOPROL-XL) 50 MG 24 hr tablet Take 50 mg by mouth daily       Multiple Vitamin (MULTIVITAMINS PO) Take  by mouth daily.       nitroGLYcerin (NITROSTAT) 0.4 MG sublingual tablet Place 1 tablet (0.4 mg) under the tongue every 5 minutes as needed for chest pain 25 tablet 5     rosuvastatin (CRESTOR) 20 MG tablet Take 1 tablet (20 mg) by mouth daily 90 tablet 3     sildenafil (REVATIO) 20 MG tablet Take 1 to 3 tabs by mouth as needed for erectile dysfunction 30 tablet 11     albuterol (PROAIR HFA/PROVENTIL HFA/VENTOLIN HFA) 108 (90 Base) MCG/ACT Inhaler Inhale 2 puffs into the lungs every 6 hours as needed for shortness of breath / dyspnea or wheezing (Patient not taking: Reported on 2/19/2020) 1 Inhaler 1     Coenzyme Q10 (CO Q 10 PO) Take 40 mg by mouth daily       dulaglutide (TRULICITY) 1.5 MG/0.5ML pen Inject 1.5 mg Subcutaneous every 7 days       No Known Allergies  Recent Labs   Lab Test 02/19/20  1035 04/15/19  1034 12/07/18  1009 05/31/18  1142  09/19/16  1445   A1C 5.9* 6.1*  --  6.2*   < > 7.1*   LDL  --  62 54 53   < >  --    HDL  --  48 45 36*   < >  --    TRIG  --  68 89 71   < >  --    ALT  --  31 27 31   < >  --    CR  --  0.74  --  0.81   < >  --    GFRESTIMATED  --  >90  --  >90   < >  --    GFRESTBLACK  --  >90  --  >90   < >  --    POTASSIUM  --  4.3  --  5.1   < >  --    TSH  --  0.78  --   --   --  0.50    < > = values in this interval not displayed.      BP Readings from Last 3 Encounters:   02/19/20 100/70   07/10/19 118/78   04/15/19 106/68    Wt Readings from Last 3 Encounters:   07/10/19 107 kg (236 lb)   04/15/19 108.5 kg (239 lb 1.6 oz)   08/10/18 104.9 kg (231 lb 4.8 oz)        Reviewed and updated as needed this visit by Provider       Review of Systems     Positive: bladder issues    Denies headache, insomnia, chest pain, shortness of breath, cough,  heartburn, bowel issues, neck pain, back pain, hip pain, knee pain, ankle pain, or foot pain. Remainder of ROS is negative unless otherwise noted above or in HPI.    This document serves as a record of the services and decisions personally performed and made by Nabeel Lowe MD. It was created on his behalf by Jesus Eckert, trained medical scribe. The creation of this document is based on the provider's statements to the medical scribe.  Jesus Eckert 10:02 AM February 19, 2020      Objective    /70   Pulse 60   Temp 97.9  F (36.6  C) (Oral)   SpO2 97%   There is no height or weight on file to calculate BMI.  Physical Exam   GENERAL: healthy, alert and no distress  RESP: lungs clear to auscultation - no rales, rhonchi or wheezes  CV: regular rate and rhythm, normal S1 S2, no S3 or S4, no murmur, click or rub, no peripheral edema and peripheral pulses strong  MS: no gross musculoskeletal defects noted, no edema  SKIN: no suspicious lesions or rashes  NEURO: Normal strength and tone, mentation intact and speech normal  PSYCH: mentation appears normal, affect normal/bright    Diagnostic Test Results:  Labs reviewed in Epic  Results for orders placed or performed in visit on 02/19/20 (from the past 24 hour(s))   Hemoglobin A1c   Result Value Ref Range    Hemoglobin A1C 5.9 (H) 0 - 5.6 %   CBC with platelets   Result Value Ref Range    WBC 5.5 4.0 - 11.0 10e9/L    RBC Count 5.36 4.4 - 5.9 10e12/L    Hemoglobin 15.3 13.3 - 17.7 g/dL    Hematocrit 45.8 40.0 - 53.0 %    MCV 85 78 - 100 fl    MCH 28.5 26.5 - 33.0 pg    MCHC 33.4 31.5 - 36.5 g/dL    RDW 13.9 10.0 - 15.0 %    Platelet Count 135 (L) 150 - 450 10e9/L         Assessment & Plan   (E11.9) Type 2 diabetes mellitus without complication, without long-term current use of insulin (H)  (primary encounter diagnosis)  Comment: At goal.   Plan: Hemoglobin A1c, Albumin Random Urine         Quantitative with Creat Ratio, CBC with         platelets        Will notify with  "results. Follow up as needed.    (I10) Hypertension, benign essential, goal below 140/90  Comment: <140/90 at goal.  Plan: Comprehensive metabolic panel        Will notify with results. Follow up as needed.    (E78.5) Hyperlipidemia with target LDL less than 100  Comment: Pending lab work.  Plan: Lipid panel reflex to direct LDL Fasting        Will notify with results. Follow up as needed.    Patient Instructions   With one week left on [employer] insurance, return to clinic for A1c testing. Follow up as needed.    BMI:   Estimated body mass index is 32.86 kg/m  as calculated from the following:    Height as of 4/15/19: 1.805 m (5' 11.06\").    Weight as of 7/10/19: 107 kg (236 lb).   Weight management plan: Discussed healthy diet and exercise guidelines    The information in this document, created by the medical scribe for me, accurately reflects the services I personally performed and the decisions made by me. I have reviewed and approved this document for accuracy prior to leaving the patient care area.  February 19, 2020 10:03 AM    Nabeel Lowe MD  [facility] [place]    "

## 2020-02-19 NOTE — PATIENT INSTRUCTIONS
With one week left on [employer] insurance, return to clinic for A1c testing. Follow up as needed.

## 2020-02-24 NOTE — RESULT ENCOUNTER NOTE
Elvin Geller, your recent results are back and are all normal. Please contact if any questions. Keep up the good work!  Nabeel

## 2020-03-13 DIAGNOSIS — E11.9 TYPE 2 DIABETES MELLITUS WITHOUT COMPLICATION, WITHOUT LONG-TERM CURRENT USE OF INSULIN (H): Primary | ICD-10-CM

## 2020-03-16 RX ORDER — METFORMIN HCL 500 MG
500 TABLET, EXTENDED RELEASE 24 HR ORAL
Qty: 270 TABLET | Refills: 0 | Status: SHIPPED | OUTPATIENT
Start: 2020-03-16 | End: 2020-05-04

## 2020-03-16 NOTE — TELEPHONE ENCOUNTER
"Requested Prescriptions   Pending Prescriptions Disp Refills     metFORMIN (GLUCOPHAGE-XR) 500 MG 24 hr tablet  Last Written Prescription Date:    Last Fill Quantity: ,  # refills:    Last office visit: 2/19/2020 with prescribing provider:  2/19/20   Future Office Visit:           Sig: Take 1 tablet (500 mg) by mouth 3 times daily (with meals)       Biguanide Agents Passed - 3/13/2020  5:08 PM        Passed - Blood pressure less than 140/90 in past 6 months     BP Readings from Last 3 Encounters:   02/19/20 100/70   07/10/19 118/78   04/15/19 106/68                 Passed - Patient has documented LDL within the past 12 mos.     Recent Labs   Lab Test 02/19/20  1035   LDL 72             Passed - Patient has had a Microalbumin in the past 15 mos.     Recent Labs   Lab Test 02/19/20  1053   MICROL 10   UMALCR 9.37             Passed - Patient is age 10 or older        Passed - Patient has documented A1c within the specified period of time.     If HgbA1C is 8 or greater, it needs to be on file within the past 3 months.  If less than 8, must be on file within the past 6 months.     Recent Labs   Lab Test 02/19/20  1035   A1C 5.9*             Passed - Patient's CR is NOT>1.4 OR Patient's EGFR is NOT<45 within past 12 mos.     Recent Labs   Lab Test 02/19/20  1035   GFRESTIMATED >90   GFRESTBLACK >90       Recent Labs   Lab Test 02/19/20  1035   CR 0.80             Passed - Patient does NOT have a diagnosis of CHF.        Passed - Medication is active on med list        Passed - Recent (6 mo) or future (30 days) visit within the authorizing provider's specialty     Patient had office visit in the last 6 months or has a visit in the next 30 days with authorizing provider or within the authorizing provider's specialty.  See \"Patient Info\" tab in inbasket, or \"Choose Columns\" in Meds & Orders section of the refill encounter.                 "

## 2020-03-24 ENCOUNTER — ALLIED HEALTH/NURSE VISIT (OUTPATIENT)
Dept: PHARMACY | Facility: CLINIC | Age: 65
End: 2020-03-24
Payer: COMMERCIAL

## 2020-03-24 DIAGNOSIS — E78.5 HYPERLIPIDEMIA WITH TARGET LDL LESS THAN 100: ICD-10-CM

## 2020-03-24 DIAGNOSIS — I25.728 CORONARY ARTERY DISEASE OF AUTOLOGOUS BYPASS GRAFT WITH STABLE ANGINA PECTORIS (H): ICD-10-CM

## 2020-03-24 DIAGNOSIS — E11.9 TYPE 2 DIABETES MELLITUS WITHOUT COMPLICATION, WITHOUT LONG-TERM CURRENT USE OF INSULIN (H): Primary | ICD-10-CM

## 2020-03-24 DIAGNOSIS — I10 HYPERTENSION, BENIGN ESSENTIAL, GOAL BELOW 140/90: ICD-10-CM

## 2020-03-24 PROCEDURE — 99606 MTMS BY PHARM EST 15 MIN: CPT | Performed by: PHARMACIST

## 2020-03-24 PROCEDURE — 99607 MTMS BY PHARM ADDL 15 MIN: CPT | Performed by: PHARMACIST

## 2020-03-24 NOTE — PROGRESS NOTES
SUBJECTIVE/OBJECTIVE:                                                    Yimi Mcqueen is a 64 year old male called in for a follow-up (7-10-19) visit for Medication Therapy Management.  He was referred to me from -MT program.     Chief Complaint:  8 months dm f/up.  He stopped trulicity 2- due to cost --he is losing his health insurance --unknown what cost going forward is.         Personal Healthcare Goals: lose another 10 lbs -20lbs.(220 is his Goal.)   Plan - cards wants him on meditarranean diet   He's a gourmet cook, organic garden , frustrated with nutrition classes --waste of time --what other options do we have for him ?  He's retired now as well.   5:52 PM   Allergies/ADRs: Reviewed in Epic  Tobacco: History of tobacco dependence - quit 16 years ago.  Alcohol: Less than 1 beverage / month  Caffeine: 1 cups/day of coffee  Activity: bike -100 miles /week x 3-5 years. 4,000  Miles/year.  PMH: Reviewed in Epic; DM x 3 years now;  Recent MI sept. 2016, 2 stents 10-10-16    Medication Adherence: issues found and discussed below and sets up own med boxes    Type -2 DM: metformin- ER 500mg tid now(2 in am and 1 pm ) ,  Off Trulicity 1.5mg/week.     SMBG:  He tests just fasting am -- they were 104 7 days , , 30 day =115 , 89=255  While he was on trulicity.    Now in the 120's consistently off the trulicity.     He admits diet is a little more sweets since coronavirus .  He did read the diabetes code recipe book-- the struggle he has is eating less red meat , he and wife are trying to eat brunch at 11am and dinner at 6pm --and eat less carbs as well--he feels they are doing pretty well.    He weighs 235lbs. now.    /59mmhg today , pulse =46. No symptoms.            Lab Results   Component Value Date    A1C 5.9 02/19/2020    A1C 6.1 04/15/2019    A1C 6.2 05/31/2018    A1C 7.9 02/08/2018    A1C 7.3 01/06/2017         His cards md wants him to try meditearrean diet --he is on a veggies/ fruits  "diet , avoiding more red meat -just monthly now. Chicken qod, fish weekly .     Sugary desserts cause him leg cramps.     He bikes a lot daily for exercise now. This makes him quite hungry !    He is concerned with how medicare will impact his insurance coverage of his trulicity. We let him know that it depends on the plan he selects when he enrolls. Discussed plans with donut hole vs non-donut hole higher premium plans. Recommended he and his wife speak to health partners to identify plans that will be accomodating of their conditions.    ACS:  MI (2016)-- 2 coronary artery drug eleuting stents -just on 81mg asa qday now.      Hyperlipidemia: Current therapy includes: rosuvastatin 20mg./day  and exercise.  Pt reports no SE's now.   Recent Labs   Lab Test 02/19/20  1035 04/15/19  1034  09/25/15  1118 09/26/14  1145   CHOL 145 124   < > 237* 202*   HDL 54 48   < > 39* 34*   LDL 72 62   < > 145* 138*   TRIG 96 68   < > 265* 149   CHOLHDLRATIO  --   --   --  6.1* 5.9*    < > = values in this interval not displayed.           Hypertension: Current medications include Lisinopril 10mg qam and metoprolol 50mg er tab hs .  Patient does self-monitor BP. Home BP monitoring in range of 100-120's systolic over 70's diastolic.pulse range 46-68.  Patient reports no current medication side effects.    BP Readings from Last 3 Encounters:   02/19/20 100/70   07/10/19 118/78   04/15/19 106/68               BP Readings from Last 1 Encounters:   02/19/20 100/70     Pulse Readings from Last 1 Encounters:   02/19/20 60     Wt Readings from Last 1 Encounters:   07/10/19 236 lb (107 kg)     Ht Readings from Last 1 Encounters:   04/15/19 5' 11.06\" (1.805 m)     Estimated body mass index is 32.86 kg/m  as calculated from the following:    Height as of 4/15/19: 5' 11.06\" (1.805 m).    Weight as of 7/10/19: 236 lb (107 kg).    Temp Readings from Last 1 Encounters:   02/19/20 97.9  F (36.6  C) (Oral)         ASSESSMENT:                          "                              Current medications were reviewed with him today.     Medication Adherence: no  Issues now.       Diabetes: Stable: Patient is meeting A1c goal of < 7%. Self monitoring of blood glucose is not at goal of fasting  mg/dL and post prandial < 150 mg/dL. Pt would benefit from minimum SMBG: Check blood sugars fasting, and occasionally 2 hours after starting a meal.   He   Metformin :  stay on the same dose.    Increased exercise--keep riding bike !  Updated Hemoglobin [id number] 5.9%.  Weight loss recommended--thru low carb diet and added glp-1 med.we had long discussion today about IF + low carb diet --he states he and wife are sort of doing this and he wants to do more of this but will discuss with his wife(she has MS and epilepsy). See plan for details.       ACS: stable    Hyperlipidemia: stable. Pt is on high intensity statin which is indicated based on 2013 ACC/AHA guidelines for lipid management.  .    Hypertension: Stable. Patient is meeting BP goal of < 140/90mmHg.        PLAN:                                                      Recommendations from today's MTM visit:                                                      1. A1c is great at 5.9%-- stay OFF the trulicity and on 2v995km. Metformin daily.     Weight at home is 235.0lbs today --goal weight is <220lbs. by end of 2020.      Spot check of bs --2 hours after a big carb meal --<150 is goal, <180 is ok --if higher than that cut back on carbs .      Call me if you want a free cheaper glucose meter that [employer] gives out for free called Glucocard vital  --I can order that for you at any time --the strips are very affordable less than 20$ /#100.         It was great to speak with you today.  I value your experience and would be very thankful for your time with providing feedback on our clinic survey. You may receive a survey via email or text message in the next few days.     Next MTM visit:  See Dr. Lowe in next 90 days  for A1c lab recheck, I will call you in 6 months for a phone follow-up- September 29th -2020 at 10:00am .      I spent 30 minutes with this patient today.  All changes were made via collaborative practice agreement with Nabeel Lowe. A copy of the visit note was provided to the patient's primary care provider.        The patient was sent via [employer] a summary of these recommendations as an after visit summary.     Anand Jenkins Rph.  Medication Therapy Management Provider  [phone]

## 2020-03-24 NOTE — PATIENT INSTRUCTIONS
Recommendations from today's MTM visit:                                                      1. A1c is great at 5.9%-- stay OFF the trulicity and on [id number]. Metformin daily.     Weight at home is 235.0lbs today --goal weight is <220lbs. by end of 2020.      Spot check of bs --2 hours after a big carb meal --<150 is goal, <180 is ok --if higher than that cut back on carbs .      Call me if you want a free cheaper glucose meter that [employer] gives out for free called Glucocard vital  --I can order that for you at any time --the strips are very affordable less than 20$ /#100.         It was great to speak with you today.  I value your experience and would be very thankful for your time with providing feedback on our clinic survey. You may receive a survey via email or text message in the next few days.     Next MTM visit:  See Dr. Lowe in next 90 days for A1c lab recheck, I will call you in 6 months for a phone follow-up- September 29th -2020 at 10:00am .      To schedule another MTM appointment, please call the clinic directly or you may call the MTM scheduling line at [phone] or toll-free at [phone].     My Clinical Pharmacist's contact information:                                                      It was a pleasure talking with you today!  Please feel free to contact me with any questions or concerns you have.      Anand Jenkins Rph.  Medication Therapy Management Provider  [phone]

## 2020-05-04 ENCOUNTER — MYC REFILL (OUTPATIENT)
Dept: FAMILY MEDICINE | Facility: CLINIC | Age: 65
End: 2020-05-04

## 2020-05-04 ENCOUNTER — MYC MEDICAL ADVICE (OUTPATIENT)
Dept: FAMILY MEDICINE | Facility: CLINIC | Age: 65
End: 2020-05-04

## 2020-05-04 ENCOUNTER — MYC REFILL (OUTPATIENT)
Dept: PHARMACY | Facility: CLINIC | Age: 65
End: 2020-05-04

## 2020-05-04 DIAGNOSIS — E78.5 HYPERLIPIDEMIA WITH TARGET LDL LESS THAN 100: ICD-10-CM

## 2020-05-04 DIAGNOSIS — E11.9 TYPE 2 DIABETES MELLITUS WITHOUT COMPLICATION, WITHOUT LONG-TERM CURRENT USE OF INSULIN (H): ICD-10-CM

## 2020-05-04 DIAGNOSIS — I10 HYPERTENSION, BENIGN ESSENTIAL, GOAL BELOW 140/90: ICD-10-CM

## 2020-05-04 DIAGNOSIS — N52.1 ERECTILE DYSFUNCTION DUE TO DISEASES CLASSIFIED ELSEWHERE: ICD-10-CM

## 2020-05-04 DIAGNOSIS — I25.728 CORONARY ARTERY DISEASE OF AUTOLOGOUS BYPASS GRAFT WITH STABLE ANGINA PECTORIS (H): ICD-10-CM

## 2020-05-04 RX ORDER — METFORMIN HCL 500 MG
500 TABLET, EXTENDED RELEASE 24 HR ORAL
Qty: 270 TABLET | Refills: 0 | Status: SHIPPED | OUTPATIENT
Start: 2020-05-04 | End: 2020-07-23

## 2020-05-04 RX ORDER — NITROGLYCERIN 0.4 MG/1
0.4 TABLET SUBLINGUAL EVERY 5 MIN PRN
Qty: 25 TABLET | Refills: 1 | Status: CANCELLED | OUTPATIENT
Start: 2020-05-04

## 2020-05-04 RX ORDER — SILDENAFIL CITRATE 20 MG/1
TABLET ORAL
Qty: 30 TABLET | Refills: 11 | Status: SHIPPED | OUTPATIENT
Start: 2020-05-04 | End: 2020-05-06

## 2020-05-04 RX ORDER — ROSUVASTATIN CALCIUM 20 MG/1
20 TABLET, COATED ORAL DAILY
Qty: 90 TABLET | Refills: 3 | Status: CANCELLED | OUTPATIENT
Start: 2020-05-04

## 2020-05-04 RX ORDER — LISINOPRIL 10 MG/1
10 TABLET ORAL DAILY
Qty: 90 TABLET | Refills: 3 | Status: SHIPPED | OUTPATIENT
Start: 2020-05-04 | End: 2021-04-02

## 2020-05-04 NOTE — TELEPHONE ENCOUNTER
"Requested Prescriptions   Pending Prescriptions Disp Refills     sildenafil (REVATIO) 20 MG tablet 30 tablet 11     Sig: Take 1 to 3 tabs by mouth as needed for erectile dysfunction       Erectile Dysfuction Protocol Failed - 5/4/2020 11:48 AM        Failed - Absence of nitrates on medication list        Passed - Absence of Alpha Blockers on Med list        Passed - Recent (12 mo) or future (30 days) visit within the authorizing provider's specialty     Patient has had an office visit with the authorizing provider or a provider within the authorizing providers department within the previous 12 mos or has a future within next 30 days. See \"Patient Info\" tab in inbasket, or \"Choose Columns\" in Meds & Orders section of the refill encounter.              Passed - Medication is active on med list        Passed - Patient is age 18 or older           lisinopril (ZESTRIL) 10 MG tablet 90 tablet 3     Sig: Take 1 tablet (10 mg) by mouth daily       ACE Inhibitors (Including Combos) Protocol Passed - 5/4/2020 11:48 AM        Passed - Blood pressure under 140/90 in past 12 months     BP Readings from Last 3 Encounters:   02/19/20 100/70   07/10/19 118/78   04/15/19 106/68                 Passed - Recent (12 mo) or future (30 days) visit within the authorizing provider's specialty     Patient has had an office visit with the authorizing provider or a provider within the authorizing providers department within the previous 12 mos or has a future within next 30 days. See \"Patient Info\" tab in inbasket, or \"Choose Columns\" in Meds & Orders section of the refill encounter.              Passed - Medication is active on med list        Passed - Patient is age 18 or older        Passed - Normal serum creatinine on file in past 12 months     Recent Labs   Lab Test 02/19/20  1035   CR 0.80       Ok to refill medication if creatinine is low          Passed - Normal serum potassium on file in past 12 months     Recent Labs   Lab Test " 02/19/20  1035   POTASSIUM 4.5              Prescription approved per [facility] Refill Protocol.  Clau Jackson RN   [facility] [place]

## 2020-05-04 NOTE — TELEPHONE ENCOUNTER
"Requested Prescriptions   Pending Prescriptions Disp Refills     metFORMIN (GLUCOPHAGE-XR) 500 MG 24 hr tablet 270 tablet 0     Sig: Take 1 tablet (500 mg) by mouth 3 times daily (with meals)       Biguanide Agents Passed - 5/4/2020 10:31 AM        Passed - Patient is age 10 or older        Passed - Patient has documented A1c within the specified period of time.     If HgbA1C is 8 or greater, it needs to be on file within the past 3 months.  If less than 8, must be on file within the past 6 months.     Recent Labs   Lab Test 02/19/20  1035   A1C 5.9*             Passed - Patient's CR is NOT>1.4 OR Patient's EGFR is NOT<45 within past 12 mos.     Recent Labs   Lab Test 02/19/20  1035   GFRESTIMATED >90   GFRESTBLACK >90       Recent Labs   Lab Test 02/19/20  1035   CR 0.80             Passed - Patient does NOT have a diagnosis of CHF.        Passed - Medication is active on med list        Passed - Recent (6 mo) or future (30 days) visit within the authorizing provider's specialty     Patient had office visit in the last 6 months or has a visit in the next 30 days with authorizing provider or within the authorizing provider's specialty.  See \"Patient Info\" tab in inbasket, or \"Choose Columns\" in Meds & Orders section of the refill encounter.             Prescription approved per [facility] [place].  Clau Jackson RN   [facility]   "

## 2020-05-05 ENCOUNTER — TELEPHONE (OUTPATIENT)
Dept: PHARMACY | Facility: CLINIC | Age: 65
End: 2020-05-05

## 2020-05-05 DIAGNOSIS — N52.1 ERECTILE DYSFUNCTION DUE TO DISEASES CLASSIFIED ELSEWHERE: ICD-10-CM

## 2020-05-05 NOTE — TELEPHONE ENCOUNTER
Reason for Call:  Other Clarification    Detailed comments: pharmacy needs clarification regarding:    sildenafil (REVATIO) 20 MG tablet    What is maximum daily dosage?    [facility]      [phone] - pharmacist      Can we leave a detailed message on this number? YES    Call taken on 5/5/2020 at 11:18 AM by Valerie Dover

## 2020-05-06 RX ORDER — SILDENAFIL CITRATE 20 MG/1
1-2 TABLET ORAL
Qty: 30 TABLET | Refills: 11 | COMMUNITY
Start: 2020-05-06 | End: 2020-09-29

## 2020-05-12 ENCOUNTER — TELEPHONE (OUTPATIENT)
Dept: FAMILY MEDICINE | Facility: CLINIC | Age: 65
End: 2020-05-12

## 2020-05-12 DIAGNOSIS — N52.01 ERECTILE DYSFUNCTION DUE TO ARTERIAL INSUFFICIENCY: Primary | ICD-10-CM

## 2020-05-12 NOTE — TELEPHONE ENCOUNTER
PRIOR AUTHORIZATION DENIED    Medication: sildenafil (REVATIO) 20 MG tablet -DENIED    Denial Date: 5/12/2020    Denial Rational: Medication is covered when used for PAH (WHO Group 1 PAH), patient's diagnosis is not covered.        Appeal Information:

## 2020-05-12 NOTE — TELEPHONE ENCOUNTER
Central Prior Authorization Team   Phone: [phone]      PA Initiation    Medication: sildenafil (REVATIO) 20 MG tablet -Initiated  Insurance Company: [employer] - Phone [phone] Fax [phone]  Pharmacy Filling the Rx: [facility] - [place], [place] - [address] [place] [address]  Filling Pharmacy Phone: [phone]  Filling Pharmacy Fax:    Start Date: 5/12/2020    Filled out forms, manually faxed back to [phone]

## 2020-05-18 RX ORDER — SILDENAFIL CITRATE 20 MG/1
TABLET ORAL
Qty: 30 TABLET | Refills: 1 | Status: SHIPPED | OUTPATIENT
Start: 2020-05-18 | End: 2023-05-10

## 2020-05-18 NOTE — TELEPHONE ENCOUNTER
PA was denied. Please order alternative med with complete SIG or begin appeal process.     If you would like to appeal:   Create letter of medical necessity or    Compile supporting clinical documentation in EPIC Telephone encounter (TE).    Route TE to: [facility].    PRIOR AUTHORIZATION DENIED     Medication: sildenafil (REVATIO) 20 MG tablet -DENIED     Denial Date: 5/12/2020     Denial Rational: Medication is covered when used for PAH (WHO Group 1 PAH), patient's diagnosis is not covered.

## 2020-05-18 NOTE — TELEPHONE ENCOUNTER
I called patient will pay for it out of pocket  Orders Placed This Encounter     sildenafil (REVATIO) 20 MG tablet     Sig: Take 1-2 tabs as needed for ED     Dispense:  30 tablet     Refill:  1     Do not submit to insurance , patient will pay out of pocket     '

## 2020-06-18 ENCOUNTER — MYC MEDICAL ADVICE (OUTPATIENT)
Dept: FAMILY MEDICINE | Facility: CLINIC | Age: 65
End: 2020-06-18

## 2020-06-19 NOTE — TELEPHONE ENCOUNTER
Dr. Lowe,     [facility] I did respond, anything to add or that you know of?   Do you have a record of my blood type?    I read that type A has more serious disease with covid-19.       Dr Kelly said to say hello!   I had a virtual checkup with him a few weeks or so.    Cesia Flores RN   [facility]

## 2020-06-19 NOTE — TELEPHONE ENCOUNTER
No matter what our blood type is, we'll all still have to take the same precautions. Recommend donating blood to ascertain blood type, as insurance may not cover otherwise. Nice to hear from Dr Kelly!   Please notify, thanks Nabeel

## 2020-07-24 ENCOUNTER — TELEPHONE (OUTPATIENT)
Dept: FAMILY MEDICINE | Facility: CLINIC | Age: 65
End: 2020-07-24

## 2020-07-24 DIAGNOSIS — E11.9 TYPE 2 DIABETES MELLITUS WITHOUT COMPLICATION, WITHOUT LONG-TERM CURRENT USE OF INSULIN (H): Primary | ICD-10-CM

## 2020-07-24 NOTE — TELEPHONE ENCOUNTER
Dr. Lowe,    My chart message from patients wifes chart:    My  Yimi Mcqueen (:  57) needs his A1C, Vitamin D, liver and kidney panel (all blood counts) and any past blood draws that were done in the past regarding his heart disease.  I believe there is one called a CBS or something.   All of these lab orders also need an order from Dr. Lowe.    As I reviewed his chart, he was seen in February and had all these labs done, does he need any of them repeated yet? They were all normal. The vitamin D was done 19. I know some people do A1C yearly and some every 6 months?    Please advise.    Thanks  Clau Jackson RN   [facility]

## 2020-08-05 DIAGNOSIS — E78.5 HYPERLIPIDEMIA WITH TARGET LDL LESS THAN 100: ICD-10-CM

## 2020-08-05 DIAGNOSIS — E11.9 TYPE 2 DIABETES MELLITUS WITHOUT COMPLICATION, WITHOUT LONG-TERM CURRENT USE OF INSULIN (H): ICD-10-CM

## 2020-08-05 DIAGNOSIS — E56.9 VITAMIN DEFICIENCY: ICD-10-CM

## 2020-08-05 LAB
ALBUMIN SERPL-MCNC: 3.9 G/DL (ref 3.4–5)
ALP SERPL-CCNC: 51 U/L (ref 40–150)
ALT SERPL W P-5'-P-CCNC: 35 U/L (ref 0–70)
ANION GAP SERPL CALCULATED.3IONS-SCNC: 1 MMOL/L (ref 3–14)
AST SERPL W P-5'-P-CCNC: 25 U/L (ref 0–45)
BILIRUB SERPL-MCNC: 1.7 MG/DL (ref 0.2–1.3)
BUN SERPL-MCNC: 22 MG/DL (ref 7–30)
CALCIUM SERPL-MCNC: 9.4 MG/DL (ref 8.5–10.1)
CHLORIDE SERPL-SCNC: 109 MMOL/L (ref 94–109)
CO2 SERPL-SCNC: 31 MMOL/L (ref 20–32)
CREAT SERPL-MCNC: 0.75 MG/DL (ref 0.66–1.25)
GFR SERPL CREATININE-BSD FRML MDRD: >90 ML/MIN/{1.73_M2}
GLUCOSE SERPL-MCNC: 139 MG/DL (ref 70–99)
HBA1C MFR BLD: 6.4 % (ref 0–5.6)
LDLC SERPL DIRECT ASSAY-MCNC: 66 MG/DL
POTASSIUM SERPL-SCNC: 5 MMOL/L (ref 3.4–5.3)
PROT SERPL-MCNC: 7.3 G/DL (ref 6.8–8.8)
SODIUM SERPL-SCNC: 141 MMOL/L (ref 133–144)

## 2020-08-05 PROCEDURE — 83721 ASSAY OF BLOOD LIPOPROTEIN: CPT | Performed by: FAMILY MEDICINE

## 2020-08-05 PROCEDURE — 36415 COLL VENOUS BLD VENIPUNCTURE: CPT | Performed by: FAMILY MEDICINE

## 2020-08-05 PROCEDURE — 80053 COMPREHEN METABOLIC PANEL: CPT | Performed by: FAMILY MEDICINE

## 2020-08-05 PROCEDURE — 82306 VITAMIN D 25 HYDROXY: CPT | Performed by: FAMILY MEDICINE

## 2020-08-05 PROCEDURE — 83036 HEMOGLOBIN GLYCOSYLATED A1C: CPT | Performed by: FAMILY MEDICINE

## 2020-08-06 LAB — DEPRECATED CALCIDIOL+CALCIFEROL SERPL-MC: 66 UG/L (ref 20–75)

## 2020-08-13 DIAGNOSIS — R17 ELEVATED BILIRUBIN: Primary | ICD-10-CM

## 2020-08-13 NOTE — RESULT ENCOUNTER NOTE
Elvin Geller: Your recent results are good except slightly elevated bili- usually not worrisome. Recommend recheck with A1c in 3 months. Contact if questions.    Nabeel

## 2020-09-04 NOTE — TELEPHONE ENCOUNTER
Elvin Geller, good news! your recent CRP result is normal. Please contact if any questions.   Nabeel Patient arrived in PACU at 10:30 am; patient's black-framed eyeglasses were wrapped in two blue surgical towels and placed in clear plastic specimen bag, a patient label was affixed to each side of bag and each side of bag had a message written on it that the bag contained the patient's eyeglasses.  This bag containing eyeglasses was given to PACU nurse/Nae.

## 2020-09-29 ENCOUNTER — ALLIED HEALTH/NURSE VISIT (OUTPATIENT)
Dept: PHARMACY | Facility: CLINIC | Age: 65
End: 2020-09-29
Payer: COMMERCIAL

## 2020-09-29 DIAGNOSIS — I25.728 CORONARY ARTERY DISEASE OF AUTOLOGOUS BYPASS GRAFT WITH STABLE ANGINA PECTORIS (H): ICD-10-CM

## 2020-09-29 DIAGNOSIS — E11.9 TYPE 2 DIABETES MELLITUS WITHOUT COMPLICATION, WITHOUT LONG-TERM CURRENT USE OF INSULIN (H): Primary | ICD-10-CM

## 2020-09-29 DIAGNOSIS — I10 HYPERTENSION, BENIGN ESSENTIAL, GOAL BELOW 140/90: ICD-10-CM

## 2020-09-29 DIAGNOSIS — E78.5 HYPERLIPIDEMIA WITH TARGET LDL LESS THAN 100: ICD-10-CM

## 2020-09-29 PROCEDURE — 99606 MTMS BY PHARM EST 15 MIN: CPT | Performed by: PHARMACIST

## 2020-09-29 RX ORDER — SWAB
2 SWAB, NON-MEDICATED MISCELLANEOUS DAILY
COMMUNITY
End: 2023-02-28

## 2020-09-29 NOTE — PROGRESS NOTES
MTM ENCOUNTER  SUBJECTIVE/OBJECTIVE:                           Yimi Mcqueen is a 65 year old male called for a follow-up visit. He was referred to me from Nabeel Lowe  .  Today's visit is a follow-up MTM visit from 3-.     He is private pay patient.     Patient consented to a telehealth visit: yes  Telemedicine Visit Details  Type of service:  Telephone visit  Start Time: 10:06 AM  End Time: 10:36 AM  Originating Location (pt. Location): Home  Distant Location (provider location):  [facility] [place]  Mode of Communication:  Telephone          Chief Complaint:    Bike ride --going 32 miles . He has biked 5300 miles/year.       Personal Healthcare Goals: lose another 10 lbs -20lbs.(220 is his Goal.)   Plan - cards wants him on meditarranean diet   He's a gourmet cook, organic garden , frustrated with nutrition classes --waste of time --what other options do we have for him ?  He's retired now as well.   5:52 PM   Allergies/ADRs: Reviewed in Epic  Tobacco: History of tobacco dependence - quit 16 years ago.  Alcohol: Less than 1 beverage / month  Caffeine: 1 cups/day of coffee  Activity: bike -100 miles /week(rides 6 x week) x 3-5 years. 4,000  Miles/year.  PMH: Reviewed in Epic; DM x 3 years now;  Recent MI sept. 2016, 2 stents 10-10-16    Medication Adherence: issues found and discussed below and sets up own med boxes    Type -2 DM: metformin- ER 500mg tid now(2 in am and 1 pm ) ,  Off Trulicity now.      SMBG:  He tests just fasting am -- they were 104 7 days , , 30 day =115 , 83=906  While he was on trulicity.    Now in the 120's consistently off the trulicity.     He admits diet is a little more sweets since coronavirus .  He did read the diabetes code recipe book-- the struggle he has is eating less red meat , he and wife are trying to eat brunch at 11am and dinner at 6pm --and eat less carbs as well--he feels they are doing pretty well.    He weighs 232lbs. now.    BP  114/59mmhg today , pulse =46. No symptoms.      Lab Results   Component Value Date    A1C 6.4 08/05/2020    A1C 5.9 02/19/2020    A1C 6.1 04/15/2019    A1C 6.2 05/31/2018    A1C 7.9 02/08/2018         His cards md wants him to try meditearrean diet --he is on a veggies/ fruits diet , avoiding more red meat -just monthly now. Chicken qod, fish weekly .     Sugary desserts cause him leg cramps.     He bikes a lot daily for exercise now. This makes him quite hungry !    He is concerned with how medicare will impact his insurance coverage of his trulicity. We let him know that it depends on the plan he selects when he enrolls. Discussed plans with donut hole vs non-donut hole higher premium plans. Recommended he and his wife speak to health partners to identify plans that will be accomodating of their conditions.    ACS:  MI (2016)-- 2 coronary artery drug eleuting stents -just on 81mg asa qday now.      Hyperlipidemia: Current therapy includes: rosuvastatin 20mg./day  and exercise.  Pt reports no SE's now.   The ASCVD Risk score ([place] MICHAEL Jr., et al., 2013) failed to calculate for the following reasons:    The patient has a prior MI or stroke diagnosis    Recent Labs   Lab Test 08/05/20  0952 02/19/20  1035 04/15/19  1034  09/25/15  1118 09/26/14  1145   CHOL  --  145 124   < > 237* 202*   HDL  --  54 48   < > 39* 34*   LDL 66 72 62   < > 145* 138*   TRIG  --  96 68   < > 265* 149   CHOLHDLRATIO  --   --   --   --  6.1* 5.9*    < > = values in this interval not displayed.           Hypertension: Current medications include Lisinopril 10mg qam and metoprolol 50mg er tab hs .  Patient does self-monitor BP. Home BP monitoring in range of 100-120's systolic over 70's diastolic.pulse range 46-68.  Patient reports no current medication side effects.    BP Readings from Last 3 Encounters:   02/19/20 100/70   07/10/19 118/78   04/15/19 106/68               BP Readings from Last 1 Encounters:   02/19/20 100/70     Pulse Readings  "from Last 1 Encounters:   02/19/20 60     Wt Readings from Last 1 Encounters:   07/10/19 236 lb (107 kg)     Ht Readings from Last 1 Encounters:   04/15/19 5' 11.06\" (1.805 m)     Estimated body mass index is 32.86 kg/m  as calculated from the following:    Height as of 4/15/19: 5' 11.06\" (1.805 m).    Weight as of 7/10/19: 236 lb (107 kg).    Temp Readings from Last 1 Encounters:   02/19/20 97.9  F (36.6  C) (Oral)         ASSESSMENT:                                                       Current medications were reviewed with him today.     Medication Adherence: no  Issues now.       Diabetes: Stable: Patient is meeting A1c goal of < 7%. Self monitoring of blood glucose is not at goal of fasting  mg/dL and post prandial < 150 mg/dL. Pt would benefit from minimum SMBG: Check blood sugars fasting, and occasionally 2 hours after starting a meal.   He   Metformin :  stay on the same dose.    Increased exercise--keep riding bike !  Updated Hemoglobin [id number]-yimeospmq 6.4%.  Weight loss recommended--thru low carb + IF diet and  Daily exercise(bike riding).-stick with current plan working well.     Flu shot -2020 in mid October.       ACS: stable    Hyperlipidemia: stable. Pt is on high intensity statin which is indicated based on 2013 ACC/AHA guidelines for lipid management.  .    Hypertension: Stable. Patient is meeting BP goal of < 140/90mmHg.        PLAN:                                                      1. FYI--Have 2020 yearly flu shot in mid-October.    2. FYI--Your A1c is excellent at 6.4% ,  and your LDL(bad)cholesterol number is excellent at 66.  Stay on all current medications as is.     It was great to speak with you today.  I value your experience and would be very thankful for your time with providing feedback on our clinic survey. You may receive a survey via email or text message in the next few days.     Next MTM visit:  1 year med review - October 5th -2021 at 9am.     I spent 15 minutes with this " patient today.  All changes were made via collaborative practice agreement with Nabeel Lowe. A copy of the visit note was provided to the patient's primary care provider.        The patient was sent via epicurio a summary of these recommendations as an after visit summary.     Anand Jenkins Rph.  Medication Therapy Management Provider  [phone]

## 2020-09-29 NOTE — Clinical Note
Kiki--I spoke with rochelle--doing well labs good save for slight increase in bilirubin --recheck that lab in 6 months.    Thx.    Anand Jenkins [facility].  Medication Therapy Management Provider  [phone]

## 2020-09-29 NOTE — PATIENT INSTRUCTIONS
Recommendations from today's MTM visit:                                                      1. FYI--Have 2020 yearly flu shot in mid-October.    2. FYI--Your A1c is excellent at 6.4% ,  and your LDL(bad)cholesterol number is excellent at 66.  Stay on all current medications as is.     It was great to speak with you today.  I value your experience and would be very thankful for your time with providing feedback on our clinic survey. You may receive a survey via email or text message in the next few days.     Next MTM visit:  1 year med review - October 5th -2021 at 9am.     To schedule another MTM appointment, please call the clinic directly or you may call the MTM scheduling line at [phone] or toll-free at [phone].     My Clinical Pharmacist's contact information:                                                      It was a pleasure talking with you today!  Please feel free to contact me with any questions or concerns you have.      Anand Jenkins Rph.  Medication Therapy Management Provider  [phone]

## 2020-10-03 DIAGNOSIS — E11.9 TYPE 2 DIABETES MELLITUS WITHOUT COMPLICATION, WITHOUT LONG-TERM CURRENT USE OF INSULIN (H): ICD-10-CM

## 2020-10-05 NOTE — TELEPHONE ENCOUNTER
Routing refill request to provider for review/approval because:  Last OV 2/19/2020    Pamella Cavazos RN   [facility]

## 2020-10-06 RX ORDER — METFORMIN HCL 500 MG
TABLET, EXTENDED RELEASE 24 HR ORAL
Qty: 270 TABLET | Refills: 3 | Status: SHIPPED | OUTPATIENT
Start: 2020-10-06 | End: 2021-10-01

## 2020-10-13 ENCOUNTER — IMMUNIZATION (OUTPATIENT)
Dept: NURSING | Facility: CLINIC | Age: 65
End: 2020-10-13
Payer: COMMERCIAL

## 2020-10-13 PROCEDURE — 90682 RIV4 VACC RECOMBINANT DNA IM: CPT

## 2020-10-13 PROCEDURE — 90471 IMMUNIZATION ADMIN: CPT

## 2021-01-15 ENCOUNTER — HEALTH MAINTENANCE LETTER (OUTPATIENT)
Age: 66
End: 2021-01-15

## 2021-03-15 ENCOUNTER — IMMUNIZATION (OUTPATIENT)
Dept: PEDIATRICS | Facility: CLINIC | Age: 66
End: 2021-03-15
Payer: COMMERCIAL

## 2021-03-15 PROCEDURE — 0001A PR COVID VAC PFIZER DIL RECON 30 MCG/0.3 ML IM: CPT

## 2021-03-15 PROCEDURE — 91300 PR COVID VAC PFIZER DIL RECON 30 MCG/0.3 ML IM: CPT

## 2021-03-21 ENCOUNTER — HEALTH MAINTENANCE LETTER (OUTPATIENT)
Age: 66
End: 2021-03-21

## 2021-04-01 DIAGNOSIS — I10 HYPERTENSION, BENIGN ESSENTIAL, GOAL BELOW 140/90: ICD-10-CM

## 2021-04-02 RX ORDER — LISINOPRIL 10 MG/1
TABLET ORAL
Qty: 90 TABLET | Refills: 3 | Status: SHIPPED | OUTPATIENT
Start: 2021-04-02 | End: 2021-04-02

## 2021-04-02 RX ORDER — LISINOPRIL 10 MG/1
10 TABLET ORAL DAILY
Qty: 90 TABLET | Refills: 0 | Status: SHIPPED | OUTPATIENT
Start: 2021-04-02 | End: 2021-05-23

## 2021-04-02 NOTE — TELEPHONE ENCOUNTER
"Dr. Lowe,    Requested Prescriptions   Pending Prescriptions Disp Refills     lisinopril (ZESTRIL) 10 MG tablet [Pharmacy Med Name: LISINOPRIL TABS 10MG] 90 tablet 3     Sig: TAKE 1 TABLET DAILY       ACE Inhibitors (Including Combos) Protocol Failed - 4/1/2021 12:16 AM        Failed - Blood pressure under 140/90 in past 12 months     BP Readings from Last 3 Encounters:   02/19/20 100/70   07/10/19 118/78   04/15/19 106/68                 Failed - Recent (12 mo) or future (30 days) visit within the authorizing provider's specialty     Patient has had an office visit with the authorizing provider or a provider within the authorizing providers department within the previous 12 mos or has a future within next 30 days. See \"Patient Info\" tab in inbasket, or \"Choose Columns\" in Meds & Orders section of the refill encounter.              Passed - Medication is active on med list        Passed - Patient is age 18 or older        Passed - Normal serum creatinine on file in past 12 months     Recent Labs   Lab Test 08/05/20  0952   CR 0.75       Ok to refill medication if creatinine is low          Passed - Normal serum potassium on file in past 12 months     Recent Labs   Lab Test 08/05/20  0952   POTASSIUM 5.0                Called pt and got him scheduled for annual with you in May - doesn't want to come in until fully vaccinated.    Routing refill request to provider for review/approval because:  Patient needs to be seen because it has been more than 1 year since last office visit.    Mara Kimbrough RN  [facility]          "

## 2021-04-05 ENCOUNTER — IMMUNIZATION (OUTPATIENT)
Dept: PEDIATRICS | Facility: CLINIC | Age: 66
End: 2021-04-05
Attending: INTERNAL MEDICINE
Payer: COMMERCIAL

## 2021-04-05 PROCEDURE — 91300 PR COVID VAC PFIZER DIL RECON 30 MCG/0.3 ML IM: CPT

## 2021-04-05 PROCEDURE — 0002A PR COVID VAC PFIZER DIL RECON 30 MCG/0.3 ML IM: CPT

## 2021-05-19 NOTE — PROGRESS NOTES
SUBJECTIVE:   CC: Yimi Mcqueen is an 65 year old male who presents for preventive health visit.     Patient has been advised of split billing requirements and indicates understanding: Yes  Healthy Habits:    Do you get at least three servings of calcium containing foods daily (dairy, green leafy vegetables, etc.)? yes    Amount of exercise or daily activities, outside of work: rides 30 miles daily 5 days a week on bike    Problems taking medications regularly No    Medication side effects: No    Have you had an eye exam in the past two years? yes    Do you see a dentist twice per year? no    Do you have sleep apnea, excessive snoring or daytime drowsiness?no    Sore inside mouth, lab work(PSA lab test, kidney.liver, Inflammatory marker for heart blood test, EKG, Sore wrist from biking.    Diabetes Follow-up    How often are you checking your blood sugar? Two times daily  Blood sugar testing frequency justification:  Patient modifying lifestyle changes (diet, exercise) with blood sugars  What time of day are you checking your blood sugars (select all that apply)?  Before and after meals  Have you had any blood sugars above 200?  No  Have you had any blood sugars below 70?  No    What symptoms do you notice when your blood sugar is low?  Hungry    What concerns do you have today about your diabetes? None     Do you have any of these symptoms? (Select all that apply)  No numbness or tingling in feet.  No redness, sores or blisters on feet.  No complaints of excessive thirst.  No reports of blurry vision.  No significant changes to weight.    Have you had a diabetic eye exam in the last 12 months?     Hyperlipidemia Follow-Up      Are you regularly taking any medication or supplement to lower your cholesterol?   Yes- Rosuvastatin    Are you having muscle aches or other side effects that you think could be caused by your cholesterol lowering medication?  No    Hypertension Follow-up      Do you check your blood  pressure regularly outside of the clinic? No     Are you following a low salt diet? Yes    Are your blood pressures ever more than 140 on the top number (systolic) OR more   than 90 on the bottom number (diastolic), for example 140/90?     BP Readings from Last 2 Encounters:   05/21/21 100/70   02/19/20 100/70     Hemoglobin A1C (%)   Date Value   05/21/2021 6.8 (H)   08/05/2020 6.4 (H)     LDL Cholesterol Calculated (mg/dL)   Date Value   05/21/2021 81   02/19/2020 72     LDL Cholesterol Direct (mg/dL)   Date Value   08/05/2020 66       Vascular Disease Follow-up      How often do you take nitroglycerin? Never    Do you take an aspirin every day? Yes      How many days per week do you miss taking your medication? 0     Today's PHQ-2 Score:   PHQ-2 ( 1999 Pfizer) 5/21/2021 2/19/2020   Q1: Little interest or pleasure in doing things 0 0   Q2: Feeling down, depressed or hopeless 0 0   PHQ-2 Score 0 0   Q1: Little interest or pleasure in doing things - -   Q2: Feeling down, depressed or hopeless - -   PHQ-2 Score - -     Abuse: Current or Past(Physical, Sexual or Emotional)- No  Do you feel safe in your environment? Yes        Social History     Tobacco Use     Smoking status: Former Smoker     Packs/day: 1.50     Years: 20.00     Pack years: 30.00     Quit date: 1/1/2006     Years since quitting: 15.4     Smokeless tobacco: Never Used   Substance Use Topics     Alcohol use: Yes     Comment: Occ.      If you drink alcohol do you typically have >3 drinks per day or >7 drinks per week? No                      Last PSA:   PSA   Date Value Ref Range Status   05/21/2021 0.66 0 - 4 ug/L Final     Comment:     Assay Method:  Chemiluminescence using Siemens Vista analyzer       Reviewed orders with patient. Reviewed health maintenance and updated orders accordingly - Yes  Lab work is in process  Labs reviewed in EPIC    Reviewed and updated as needed this visit by clinical staff  Tobacco  Allergies  Meds   Med Hx  Surg Hx   Fam Hx  Soc Hx        Reviewed and updated as needed this visit by Provider                    ROS:  CONSTITUTIONAL: NEGATIVE for fever, chills, change in weight  INTEGUMENTARY/SKIN: NEGATIVE for worrisome rashes, moles or lesions  EYES: NEGATIVE for vision changes or irritation  ENT: NEGATIVE for ear, mouth and throat problems  RESP: NEGATIVE for significant cough or SOB  CV: NEGATIVE for chest pain, palpitations or peripheral edema  GI: NEGATIVE for nausea, abdominal pain, heartburn, or change in bowel habits   male: negative for dysuria, hematuria, decreased urinary stream, erectile dysfunction, urethral discharge  MUSCULOSKELETAL: NEGATIVE for significant arthralgias or myalgia  NEURO: NEGATIVE for weakness, dizziness or paresthesias  PSYCHIATRIC: NEGATIVE for changes in mood or affect    OBJECTIVE:   /70   Pulse 70   Temp 97.6  F (36.4  C) (Temporal)   Wt 112.9 kg (249 lb)   SpO2 96%   BMI 34.67 kg/m    EXAM:  GENERAL: healthy, alert, no distress and over weight  EYES: Eyes grossly normal to inspection, PERRL and conjunctivae and sclerae normal  HENT: ear canals and TM's normal, nose and mouth without ulcers, 3 mm pink mucous cyst seen in the left cheek without leukoplakia or inflammation  NECK: no adenopathy, no asymmetry, masses, or scars and thyroid normal to palpation  RESP: lungs clear to auscultation - no rales, rhonchi or wheezes  CV: regular rate and rhythm, normal S1 S2, no S3 or S4, no murmur, click or rub, no peripheral edema and peripheral pulses strong  ABDOMEN: soft, nontender, no hepatosplenomegaly, no masses and bowel sounds normal  MS: LLE exam shows healed skin graft with extensive scarring front and back, stable  SKIN: no suspicious lesions or rashes  NEURO: Normal strength and tone, mentation intact and speech normal  PSYCH: mentation appears normal, affect normal/bright    Diagnostic Test Results:  Labs reviewed in Epic    ASSESSMENT/PLAN:       ICD-10-CM    1. Routine  "general medical examination at a health care facility  Z00.00 **Prostate spec antigen screen FUTURE anytime   2. Type 2 diabetes mellitus with stage 2 chronic kidney disease, without long-term current use of insulin (H)  E11.22     N18.2    3. Hypertension, benign essential, goal below 140/90  I10 lisinopril (ZESTRIL) 10 MG tablet     metoprolol succinate ER (TOPROL-XL) 50 MG 24 hr tablet   4. Hyperlipidemia with target LDL less than 100  E78.5 rosuvastatin (CRESTOR) 20 MG tablet   5. Old myocardial infarction  I25.2 Comprehensive metabolic panel     Lipid panel reflex to direct LDL Fasting     metoprolol succinate ER (TOPROL-XL) 50 MG 24 hr tablet       Patient has been advised of split billing requirements and indicates understanding: Yes  COUNSELING:  Reviewed preventive health counseling, as reflected in patient instructions       Regular exercise       Healthy diet/nutrition       Vision screening    Estimated body mass index is 34.67 kg/m  as calculated from the following:    Height as of 4/15/19: 1.805 m (5' 11.06\").    Weight as of this encounter: 112.9 kg (249 lb).    Weight management plan: Discussed healthy diet and exercise guidelines    He reports that he quit smoking about 15 years ago. He has a 30.00 pack-year smoking history. He has never used smokeless tobacco.    Nabeel Lowe MD  [facility]  "

## 2021-05-21 ENCOUNTER — OFFICE VISIT (OUTPATIENT)
Dept: FAMILY MEDICINE | Facility: CLINIC | Age: 66
End: 2021-05-21
Payer: COMMERCIAL

## 2021-05-21 VITALS
TEMPERATURE: 97.6 F | OXYGEN SATURATION: 96 % | HEART RATE: 70 BPM | WEIGHT: 249 LBS | SYSTOLIC BLOOD PRESSURE: 100 MMHG | DIASTOLIC BLOOD PRESSURE: 70 MMHG | BODY MASS INDEX: 34.67 KG/M2

## 2021-05-21 DIAGNOSIS — E11.9 TYPE 2 DIABETES MELLITUS WITHOUT COMPLICATION, WITHOUT LONG-TERM CURRENT USE OF INSULIN (H): ICD-10-CM

## 2021-05-21 DIAGNOSIS — I10 HYPERTENSION, BENIGN ESSENTIAL, GOAL BELOW 140/90: ICD-10-CM

## 2021-05-21 DIAGNOSIS — Z00.00 ROUTINE GENERAL MEDICAL EXAMINATION AT A HEALTH CARE FACILITY: ICD-10-CM

## 2021-05-21 DIAGNOSIS — I25.2 OLD MYOCARDIAL INFARCTION: ICD-10-CM

## 2021-05-21 DIAGNOSIS — E78.5 HYPERLIPIDEMIA WITH TARGET LDL LESS THAN 100: ICD-10-CM

## 2021-05-21 DIAGNOSIS — Z00.00 ROUTINE GENERAL MEDICAL EXAMINATION AT A HEALTH CARE FACILITY: Primary | ICD-10-CM

## 2021-05-21 DIAGNOSIS — E11.22 TYPE 2 DIABETES MELLITUS WITH STAGE 2 CHRONIC KIDNEY DISEASE, WITHOUT LONG-TERM CURRENT USE OF INSULIN (H): ICD-10-CM

## 2021-05-21 DIAGNOSIS — N18.2 TYPE 2 DIABETES MELLITUS WITH STAGE 2 CHRONIC KIDNEY DISEASE, WITHOUT LONG-TERM CURRENT USE OF INSULIN (H): ICD-10-CM

## 2021-05-21 LAB
ALBUMIN SERPL-MCNC: 4.3 G/DL (ref 3.4–5)
ALP SERPL-CCNC: 52 U/L (ref 40–150)
ALT SERPL W P-5'-P-CCNC: 43 U/L (ref 0–70)
ANION GAP SERPL CALCULATED.3IONS-SCNC: 5 MMOL/L (ref 3–14)
AST SERPL W P-5'-P-CCNC: 38 U/L (ref 0–45)
BILIRUB SERPL-MCNC: 1.2 MG/DL (ref 0.2–1.3)
BUN SERPL-MCNC: 18 MG/DL (ref 7–30)
CALCIUM SERPL-MCNC: 9.3 MG/DL (ref 8.5–10.1)
CHLORIDE SERPL-SCNC: 107 MMOL/L (ref 94–109)
CHOLEST SERPL-MCNC: 154 MG/DL
CO2 SERPL-SCNC: 28 MMOL/L (ref 20–32)
CREAT SERPL-MCNC: 0.73 MG/DL (ref 0.66–1.25)
CREAT UR-MCNC: 98 MG/DL
GFR SERPL CREATININE-BSD FRML MDRD: >90 ML/MIN/{1.73_M2}
GLUCOSE SERPL-MCNC: 126 MG/DL (ref 70–99)
HBA1C MFR BLD: 6.8 % (ref 0–5.6)
HDLC SERPL-MCNC: 49 MG/DL
LDLC SERPL CALC-MCNC: 81 MG/DL
MICROALBUMIN UR-MCNC: 16 MG/L
MICROALBUMIN/CREAT UR: 16.18 MG/G CR (ref 0–17)
NONHDLC SERPL-MCNC: 105 MG/DL
POTASSIUM SERPL-SCNC: 4.6 MMOL/L (ref 3.4–5.3)
PROT SERPL-MCNC: 7.5 G/DL (ref 6.8–8.8)
SODIUM SERPL-SCNC: 140 MMOL/L (ref 133–144)
TRIGL SERPL-MCNC: 119 MG/DL

## 2021-05-21 PROCEDURE — 83036 HEMOGLOBIN GLYCOSYLATED A1C: CPT | Performed by: FAMILY MEDICINE

## 2021-05-21 PROCEDURE — 36415 COLL VENOUS BLD VENIPUNCTURE: CPT | Performed by: FAMILY MEDICINE

## 2021-05-21 PROCEDURE — 80053 COMPREHEN METABOLIC PANEL: CPT | Performed by: FAMILY MEDICINE

## 2021-05-21 PROCEDURE — 80061 LIPID PANEL: CPT | Performed by: FAMILY MEDICINE

## 2021-05-21 PROCEDURE — 82043 UR ALBUMIN QUANTITATIVE: CPT | Performed by: FAMILY MEDICINE

## 2021-05-21 PROCEDURE — 99396 PREV VISIT EST AGE 40-64: CPT | Performed by: FAMILY MEDICINE

## 2021-05-21 PROCEDURE — G0103 PSA SCREENING: HCPCS | Performed by: FAMILY MEDICINE

## 2021-05-22 LAB — PSA SERPL-ACNC: 0.66 UG/L (ref 0–4)

## 2021-05-23 PROBLEM — M79.10 MYALGIA, UNSPECIFIED SITE: Status: RESOLVED | Noted: 2019-04-15 | Resolved: 2021-05-23

## 2021-05-23 PROBLEM — E11.22 TYPE 2 DIABETES MELLITUS WITH STAGE 2 CHRONIC KIDNEY DISEASE, WITHOUT LONG-TERM CURRENT USE OF INSULIN (H): Status: ACTIVE | Noted: 2017-07-10

## 2021-05-23 PROBLEM — N18.2 TYPE 2 DIABETES MELLITUS WITH STAGE 2 CHRONIC KIDNEY DISEASE, WITHOUT LONG-TERM CURRENT USE OF INSULIN (H): Status: ACTIVE | Noted: 2017-07-10

## 2021-05-23 RX ORDER — LISINOPRIL 10 MG/1
10 TABLET ORAL DAILY
Qty: 90 TABLET | Refills: 3 | Status: SHIPPED | OUTPATIENT
Start: 2021-05-23 | End: 2021-10-15

## 2021-05-23 RX ORDER — METOPROLOL SUCCINATE 50 MG/1
50 TABLET, EXTENDED RELEASE ORAL DAILY
Qty: 90 TABLET | Refills: 3 | Status: SHIPPED | OUTPATIENT
Start: 2021-05-23 | End: 2021-10-15

## 2021-05-23 RX ORDER — ROSUVASTATIN CALCIUM 20 MG/1
20 TABLET, COATED ORAL DAILY
Qty: 90 TABLET | Refills: 3 | Status: SHIPPED | OUTPATIENT
Start: 2021-05-23 | End: 2021-10-15

## 2021-05-30 NOTE — RESULT ENCOUNTER NOTE
Elvin Geller: Your recent results are at goal/ within normal limits.  The best assessment to predict heart health over the next 12 months would be a stress echo.  Please let me know if you like to have one scheduled or would like to discuss further.  Nice to see you, continue to stay safe!    Nabeel

## 2021-06-10 DIAGNOSIS — I25.728 CORONARY ARTERY DISEASE OF AUTOLOGOUS BYPASS GRAFT WITH STABLE ANGINA PECTORIS (H): ICD-10-CM

## 2021-06-10 NOTE — TELEPHONE ENCOUNTER
Reason for Call:  Medication or medication refill:    Do you use a [facility]?  Name of the pharmacy and phone number for the current request:  [facility]    Name of the medication requested: nitroGLYcerin (NITROSTAT) 0.4 MG sublingual tablet    Other request: NA    Can we leave a detailed message on this number? Not Applicable    Phone number patient can be reached at: Other phone number:  [phone]      Call taken on 6/10/2021 at 3:23 PM by Noemi Reeder

## 2021-06-11 RX ORDER — NITROGLYCERIN 0.4 MG/1
0.4 TABLET SUBLINGUAL EVERY 5 MIN PRN
Qty: 25 TABLET | Refills: 5 | Status: SHIPPED | OUTPATIENT
Start: 2021-06-11 | End: 2023-11-29

## 2021-09-04 ENCOUNTER — HEALTH MAINTENANCE LETTER (OUTPATIENT)
Age: 66
End: 2021-09-04

## 2021-10-01 DIAGNOSIS — E11.9 TYPE 2 DIABETES MELLITUS WITHOUT COMPLICATION, WITHOUT LONG-TERM CURRENT USE OF INSULIN (H): ICD-10-CM

## 2021-10-01 RX ORDER — METFORMIN HCL 500 MG
TABLET, EXTENDED RELEASE 24 HR ORAL
Qty: 270 TABLET | Refills: 3 | Status: SHIPPED | OUTPATIENT
Start: 2021-10-01 | End: 2021-10-15

## 2021-10-01 NOTE — TELEPHONE ENCOUNTER
"Requested Prescriptions   Signed Prescriptions Disp Refills    metFORMIN (GLUCOPHAGE-XR) 500 MG 24 hr tablet 270 tablet 3     Sig: TAKE 1 TABLET THREE TIMES A DAY WITH MEALS       Biguanide Agents Passed - 10/1/2021 12:17 AM        Passed - Patient is age 10 or older        Passed - Patient has documented A1c within the specified period of time.     If HgbA1C is 8 or greater, it needs to be on file within the past 3 months.  If less than 8, must be on file within the past 6 months.     Recent Labs   Lab Test 05/21/21  1435   A1C 6.8*             Passed - Patient's CR is NOT>1.4 OR Patient's EGFR is NOT<45 within past 12 mos.     Recent Labs   Lab Test 05/21/21  1435   GFRESTIMATED >90   GFRESTBLACK >90       Recent Labs   Lab Test 05/21/21  1435   CR 0.73             Passed - Patient does NOT have a diagnosis of CHF.        Passed - Medication is active on med list        Passed - Recent (6 mo) or future (30 days) visit within the authorizing provider's specialty     Patient had office visit in the last 6 months or has a visit in the next 30 days with authorizing provider or within the authorizing provider's specialty.  See \"Patient Info\" tab in inbasket, or \"Choose Columns\" in Meds & Orders section of the refill encounter.               Thanks,  DOTTIE Sheffield  [facility]     "

## 2021-10-15 DIAGNOSIS — E11.9 TYPE 2 DIABETES MELLITUS WITHOUT COMPLICATION, WITHOUT LONG-TERM CURRENT USE OF INSULIN (H): ICD-10-CM

## 2021-10-15 DIAGNOSIS — I10 HYPERTENSION, BENIGN ESSENTIAL, GOAL BELOW 140/90: ICD-10-CM

## 2021-10-15 DIAGNOSIS — I25.2 OLD MYOCARDIAL INFARCTION: ICD-10-CM

## 2021-10-15 DIAGNOSIS — E78.5 HYPERLIPIDEMIA WITH TARGET LDL LESS THAN 100: ICD-10-CM

## 2021-10-15 RX ORDER — METOPROLOL SUCCINATE 50 MG/1
50 TABLET, EXTENDED RELEASE ORAL DAILY
Qty: 90 TABLET | Refills: 3 | Status: SHIPPED | OUTPATIENT
Start: 2021-10-15 | End: 2022-10-03

## 2021-10-15 RX ORDER — ROSUVASTATIN CALCIUM 20 MG/1
20 TABLET, COATED ORAL DAILY
Qty: 90 TABLET | Refills: 3 | Status: SHIPPED | OUTPATIENT
Start: 2021-10-15 | End: 2022-10-03

## 2021-10-15 RX ORDER — LISINOPRIL 10 MG/1
10 TABLET ORAL DAILY
Qty: 90 TABLET | Refills: 3 | Status: SHIPPED | OUTPATIENT
Start: 2021-10-15 | End: 2023-01-06

## 2021-10-15 RX ORDER — METFORMIN HCL 500 MG
TABLET, EXTENDED RELEASE 24 HR ORAL
Qty: 270 TABLET | Refills: 3 | Status: SHIPPED | OUTPATIENT
Start: 2021-10-15 | End: 2022-10-04

## 2021-10-15 NOTE — TELEPHONE ENCOUNTER
"Requested Prescriptions   Signed Prescriptions Disp Refills    lisinopril (ZESTRIL) 10 MG tablet 90 tablet 3     Sig: Take 1 tablet (10 mg) by mouth daily       ACE Inhibitors (Including Combos) Protocol Passed - 10/15/2021 12:04 PM        Passed - Blood pressure under 140/90 in past 12 months     BP Readings from Last 3 Encounters:   05/21/21 100/70   02/19/20 100/70   07/10/19 118/78                 Passed - Recent (12 mo) or future (30 days) visit within the authorizing provider's specialty     Patient has had an office visit with the authorizing provider or a provider within the authorizing providers department within the previous 12 mos or has a future within next 30 days. See \"Patient Info\" tab in inbasket, or \"Choose Columns\" in Meds & Orders section of the refill encounter.              Passed - Medication is active on med list        Passed - Patient is age 18 or older        Passed - Normal serum creatinine on file in past 12 months     Recent Labs   Lab Test 05/21/21  1435   CR 0.73       Ok to refill medication if creatinine is low          Passed - Normal serum potassium on file in past 12 months     Recent Labs   Lab Test 05/21/21  1435   POTASSIUM 4.6               metFORMIN (GLUCOPHAGE-XR) 500 MG 24 hr tablet 270 tablet 3     Sig: TAKE 1 TABLET THREE TIMES A DAY WITH MEALS       Biguanide Agents Passed - 10/15/2021 12:04 PM        Passed - Patient is age 10 or older        Passed - Patient has documented A1c within the specified period of time.     If HgbA1C is 8 or greater, it needs to be on file within the past 3 months.  If less than 8, must be on file within the past 6 months.     Recent Labs   Lab Test 05/21/21  1435   A1C 6.8*             Passed - Patient's CR is NOT>1.4 OR Patient's EGFR is NOT<45 within past 12 mos.     Recent Labs   Lab Test 05/21/21  1435   GFRESTIMATED >90   GFRESTBLACK >90       Recent Labs   Lab Test 05/21/21  1435   CR 0.73             Passed - Patient does NOT have a " "diagnosis of CHF.        Passed - Medication is active on med list        Passed - Recent (6 mo) or future (30 days) visit within the authorizing provider's specialty     Patient had office visit in the last 6 months or has a visit in the next 30 days with authorizing provider or within the authorizing provider's specialty.  See \"Patient Info\" tab in inbasket, or \"Choose Columns\" in Meds & Orders section of the refill encounter.              metoprolol succinate ER (TOPROL-XL) 50 MG 24 hr tablet 90 tablet 3     Sig: Take 1 tablet (50 mg) by mouth daily       Beta-Blockers Protocol Passed - 10/15/2021 12:04 PM        Passed - Blood pressure under 140/90 in past 12 months     BP Readings from Last 3 Encounters:   05/21/21 100/70   02/19/20 100/70   07/10/19 118/78                 Passed - Patient is age 6 or older        Passed - Recent (12 mo) or future (30 days) visit within the authorizing provider's specialty     Patient has had an office visit with the authorizing provider or a provider within the authorizing providers department within the previous 12 mos or has a future within next 30 days. See \"Patient Info\" tab in inbasket, or \"Choose Columns\" in Meds & Orders section of the refill encounter.              Passed - Medication is active on med list          rosuvastatin (CRESTOR) 20 MG tablet 90 tablet 3     Sig: Take 1 tablet (20 mg) by mouth daily       Statins Protocol Passed - 10/15/2021 12:04 PM        Passed - LDL on file in past 12 months     Recent Labs   Lab Test 05/21/21  1435   LDL 81             Passed - No abnormal creatine kinase in past 12 months     Recent Labs   Lab Test 12/07/18  1009                   Passed - Recent (12 mo) or future (30 days) visit within the authorizing provider's specialty     Patient has had an office visit with the authorizing provider or a provider within the authorizing providers department within the previous 12 mos or has a future within next 30 days. See " "\"Patient Info\" tab in inbasket, or \"Choose Columns\" in Meds & Orders section of the refill encounter.              Passed - Medication is active on med list        Passed - Patient is age 18 or older           Nettie Adrian RN  [facility]     "

## 2021-10-19 ENCOUNTER — MYC MEDICAL ADVICE (OUTPATIENT)
Dept: FAMILY MEDICINE | Facility: CLINIC | Age: 66
End: 2021-10-19

## 2021-10-19 DIAGNOSIS — D22.9 NUMEROUS MOLES: Primary | ICD-10-CM

## 2021-10-19 DIAGNOSIS — Z80.8 FAMILY HISTORY OF SKIN CANCER: ICD-10-CM

## 2021-10-19 NOTE — TELEPHONE ENCOUNTER
Dr Lowe    See pt MyChart message  Referral cued    Pamella Cavazos, RN   [facility]

## 2021-10-25 NOTE — TELEPHONE ENCOUNTER
Stephanie message to pt  Dermatology   [address]   [address]   [place] [address]   Phone: [phone]   Pamella Cavazos RN   [facility]

## 2021-12-25 ENCOUNTER — HEALTH MAINTENANCE LETTER (OUTPATIENT)
Age: 66
End: 2021-12-25

## 2022-02-28 ENCOUNTER — APPOINTMENT (OUTPATIENT)
Dept: LAB | Facility: CLINIC | Age: 67
End: 2022-02-28
Payer: MEDICARE

## 2022-03-02 ENCOUNTER — TELEPHONE (OUTPATIENT)
Dept: FAMILY MEDICINE | Facility: CLINIC | Age: 67
End: 2022-03-02
Payer: MEDICARE

## 2022-03-02 DIAGNOSIS — N18.2 TYPE 2 DIABETES MELLITUS WITH STAGE 2 CHRONIC KIDNEY DISEASE, WITHOUT LONG-TERM CURRENT USE OF INSULIN (H): Primary | ICD-10-CM

## 2022-03-02 DIAGNOSIS — Z12.5 SCREENING FOR PROSTATE CANCER: ICD-10-CM

## 2022-03-02 DIAGNOSIS — E55.9 VITAMIN D DEFICIENCY: ICD-10-CM

## 2022-03-02 DIAGNOSIS — E11.22 TYPE 2 DIABETES MELLITUS WITH STAGE 2 CHRONIC KIDNEY DISEASE, WITHOUT LONG-TERM CURRENT USE OF INSULIN (H): Primary | ICD-10-CM

## 2022-03-02 NOTE — TELEPHONE ENCOUNTER
Wife calling to request lab orders for PSA, liver and kidney panel. Also any labs PCP might feel as necessary

## 2022-03-03 NOTE — TELEPHONE ENCOUNTER
Pt's spouse calling back to make sure pt had A1C lab ordered - cued if agree.    Mara Kimbrough RN  [facility]

## 2022-03-03 NOTE — TELEPHONE ENCOUNTER
Dr. Lowe,    Called pt to notify - requesting Vitamin D lab as well. Cued if agree.    Mara Kimbrough RN  [facility]

## 2022-03-03 NOTE — TELEPHONE ENCOUNTER
Dr. Lowe,    Please see message below.    PSA and CMP cued - are there any others you would like checked?    Mara Kimbrough RN  [facility]

## 2022-03-24 ENCOUNTER — TELEPHONE (OUTPATIENT)
Dept: FAMILY MEDICINE | Facility: CLINIC | Age: 67
End: 2022-03-24
Payer: MEDICARE

## 2022-03-24 DIAGNOSIS — I25.728 CORONARY ARTERY DISEASE OF AUTOLOGOUS BYPASS GRAFT WITH STABLE ANGINA PECTORIS (H): Primary | ICD-10-CM

## 2022-03-29 ENCOUNTER — TELEPHONE (OUTPATIENT)
Dept: FAMILY MEDICINE | Facility: CLINIC | Age: 67
End: 2022-03-29
Payer: MEDICARE

## 2022-03-29 NOTE — TELEPHONE ENCOUNTER
Dr. Lowe,    Can we use a virtual spot for pt and spouse so can be seen sooner than July?    Thanks,  DOTTIE Sheffield  [facility]

## 2022-03-29 NOTE — TELEPHONE ENCOUNTER
Reason for Call:  Other appointment and call back    Detailed comments: Spouse called to request back to back appointment with patient and to be placed on cancellation list for sooner appointment.    Phone Number Patient can be reached at: Home number on file [phone] (home)    Best Time: anytime    Can we leave a detailed message on this number? YES    Call taken on 3/29/2022 at 9:39 AM by Gurpreet Marcano MA

## 2022-04-01 ENCOUNTER — LAB (OUTPATIENT)
Dept: LAB | Facility: CLINIC | Age: 67
End: 2022-04-01
Payer: MEDICARE

## 2022-04-01 DIAGNOSIS — N18.2 TYPE 2 DIABETES MELLITUS WITH STAGE 2 CHRONIC KIDNEY DISEASE, WITHOUT LONG-TERM CURRENT USE OF INSULIN (H): ICD-10-CM

## 2022-04-01 DIAGNOSIS — I25.728 CORONARY ARTERY DISEASE OF AUTOLOGOUS BYPASS GRAFT WITH STABLE ANGINA PECTORIS (H): ICD-10-CM

## 2022-04-01 DIAGNOSIS — E55.9 VITAMIN D DEFICIENCY: ICD-10-CM

## 2022-04-01 DIAGNOSIS — E11.22 TYPE 2 DIABETES MELLITUS WITH STAGE 2 CHRONIC KIDNEY DISEASE, WITHOUT LONG-TERM CURRENT USE OF INSULIN (H): ICD-10-CM

## 2022-04-01 DIAGNOSIS — Z12.5 SCREENING FOR PROSTATE CANCER: ICD-10-CM

## 2022-04-01 LAB
CRP SERPL-MCNC: 5.3 MG/L (ref 0–8)
DEPRECATED CALCIDIOL+CALCIFEROL SERPL-MC: 58 UG/L (ref 20–75)
HBA1C MFR BLD: 6.6 % (ref 0–5.6)

## 2022-04-01 PROCEDURE — 82043 UR ALBUMIN QUANTITATIVE: CPT

## 2022-04-01 PROCEDURE — 83036 HEMOGLOBIN GLYCOSYLATED A1C: CPT

## 2022-04-01 PROCEDURE — 82306 VITAMIN D 25 HYDROXY: CPT

## 2022-04-01 PROCEDURE — G0103 PSA SCREENING: HCPCS

## 2022-04-01 PROCEDURE — 80061 LIPID PANEL: CPT

## 2022-04-01 PROCEDURE — 86140 C-REACTIVE PROTEIN: CPT

## 2022-04-01 PROCEDURE — 80053 COMPREHEN METABOLIC PANEL: CPT

## 2022-04-01 PROCEDURE — 36415 COLL VENOUS BLD VENIPUNCTURE: CPT

## 2022-04-02 LAB
ALBUMIN SERPL-MCNC: 3.7 G/DL (ref 3.4–5)
ALP SERPL-CCNC: 50 U/L (ref 40–150)
ALT SERPL W P-5'-P-CCNC: 35 U/L (ref 0–70)
ANION GAP SERPL CALCULATED.3IONS-SCNC: 4 MMOL/L (ref 3–14)
AST SERPL W P-5'-P-CCNC: 26 U/L (ref 0–45)
BILIRUB SERPL-MCNC: 1.6 MG/DL (ref 0.2–1.3)
BUN SERPL-MCNC: 17 MG/DL (ref 7–30)
CALCIUM SERPL-MCNC: 9.7 MG/DL (ref 8.5–10.1)
CHLORIDE BLD-SCNC: 106 MMOL/L (ref 94–109)
CHOLEST SERPL-MCNC: 130 MG/DL
CO2 SERPL-SCNC: 27 MMOL/L (ref 20–32)
CREAT SERPL-MCNC: 0.82 MG/DL (ref 0.66–1.25)
FASTING STATUS PATIENT QL REPORTED: YES
GFR SERPL CREATININE-BSD FRML MDRD: >90 ML/MIN/1.73M2
GLUCOSE BLD-MCNC: 167 MG/DL (ref 70–99)
HDLC SERPL-MCNC: 42 MG/DL
LDLC SERPL CALC-MCNC: 69 MG/DL
NONHDLC SERPL-MCNC: 88 MG/DL
POTASSIUM BLD-SCNC: 5.1 MMOL/L (ref 3.4–5.3)
PROT SERPL-MCNC: 7.1 G/DL (ref 6.8–8.8)
PSA SERPL-MCNC: 0.68 UG/L (ref 0–4)
SODIUM SERPL-SCNC: 137 MMOL/L (ref 133–144)
TRIGL SERPL-MCNC: 97 MG/DL

## 2022-04-03 LAB
CREAT UR-MCNC: 98 MG/DL
MICROALBUMIN UR-MCNC: 19 MG/L
MICROALBUMIN/CREAT UR: 19.39 MG/G CR (ref 0–17)

## 2022-04-07 NOTE — RESULT ENCOUNTER NOTE
Elvin Geller: Your recent results are mostly at goal. However, the elevated loss of albumen in urine should be rechecked at your May appointment. Good diabetes control (A1c<7.0) and good blood pressure control <140/90, both of which you have, is good. Gradually picking up your exercise/biking, may help as well. See you in May.     Nabeel

## 2022-04-25 ASSESSMENT — ACTIVITIES OF DAILY LIVING (ADL): CURRENT_FUNCTION: NO ASSISTANCE NEEDED

## 2022-04-28 ENCOUNTER — OFFICE VISIT (OUTPATIENT)
Dept: DERMATOLOGY | Facility: CLINIC | Age: 67
End: 2022-04-28
Attending: FAMILY MEDICINE
Payer: MEDICARE

## 2022-04-28 DIAGNOSIS — D22.9 NUMEROUS MOLES: ICD-10-CM

## 2022-04-28 DIAGNOSIS — Z80.8 FAMILY HISTORY OF SKIN CANCER: ICD-10-CM

## 2022-04-28 DIAGNOSIS — B00.9 HSV (HERPES SIMPLEX VIRUS) INFECTION: Primary | ICD-10-CM

## 2022-04-28 DIAGNOSIS — L57.0 ACTINIC KERATOSIS: ICD-10-CM

## 2022-04-28 PROCEDURE — 99203 OFFICE O/P NEW LOW 30 MIN: CPT | Mod: 25 | Performed by: STUDENT IN AN ORGANIZED HEALTH CARE EDUCATION/TRAINING PROGRAM

## 2022-04-28 PROCEDURE — 17000 DESTRUCT PREMALG LESION: CPT | Mod: GC | Performed by: STUDENT IN AN ORGANIZED HEALTH CARE EDUCATION/TRAINING PROGRAM

## 2022-04-28 RX ORDER — VALACYCLOVIR HYDROCHLORIDE 1 G/1
2000 TABLET, FILM COATED ORAL 2 TIMES DAILY
Qty: 4 TABLET | Refills: 3 | Status: SHIPPED | OUTPATIENT
Start: 2022-04-28 | End: 2023-05-11

## 2022-04-28 ASSESSMENT — PAIN SCALES - GENERAL: PAINLEVEL: NO PAIN (0)

## 2022-04-28 NOTE — LETTER
4/28/2022       RE: Yimi Mcqueen  [address] [facility] [address]     Dear Colleague,    Thank you for referring your patient, Yimi Mcqueen, to the [facility]. Please see a copy of my visit note below.    [facility] Dermatology Note  Encounter Date: Apr 28, 2022  Office Visit     Dermatology Problem List:  1. AK - left lower lip - LN2  2. Recurrent herpes labialis - 2g BID valtrex prn    ____________________________________________    Assessment & Plan:     # AK - left lower lip.  - Cryotherapy performed today (see procedure note(s) below).  - Will start valtrex 2g twice daily x 1 days - reviewed side effects     # Recurrent herpes labialis   - Additional 2g BID valtrex prn fills provided    # Benign lesions (cherry angiomas, SKs, lentigines, clinically banal appearing melanocytic nevi)  - ABCDs of melanoma were discussed and self skin checks were advised.  - Signs and symptoms of NMSC discussed  - Sun precaution was advised including the use of sun screens of SPF 30 or higher, sun protective clothing, and avoidance of tanning beds.  - Ultimately reassurance provided and benign nature of condition discussed      Procedures Performed:   - Cryotherapy procedure note, location(s): left lower lip. After verbal consent and discussion of risks and benefits including, but not limited to, dyspigmentation/scar, blister, and pain, 1 lesion(s) was(were) treated with 1-2 mm freeze border for 1-2 cycles with liquid nitrogen. Post cryotherapy instructions were provided.    Follow-up: 1 year(s) in-person, or earlier for new or changing lesions    Staff and Resident:     This patient was staffed with Dr. Mcbride.    Hamzah Guzman MD  Internal Medicine - Dermatology PGY 4    Patient was seen and examined with the medicine/dermatology resident. I agree with the history, review of systems,  physical examination, assessments and plan. I was present for the entire cryotherapy procedure.    Freya Mcbride MD  Professor and  Chair  Department of Dermatology  [facility]  ____________________________________________    CC: Skin Check    HPI:  Mr. Yimi Mcqueen is a(n) 66 year old male who presents today as a new patient for skin check  - Multiple moles  - Avid cyclist and not good with sun protection  - Gets a flaky spot on the lower lip  - Gets intermittent cold sores - 2-3x/year   - Patient is otherwise feeling well, without additional skin concerns.    Labs Reviewed:  N/A    Physical Exam:  GEN: Pleasant male, in NAD  SKIN: Skin, which includes the head/face, both arms, chest, back, abdomen,both legs, digits and/or nails, was examined.  -Scattered erythematous gritty papules on left lower lip  -Homogenous tan macules on sun exposed skin  -Scattered bright red domed papules on trunk and extremities  -Scattered waxy stuck-on papules and brown macules w/o concerning clinical features  -Well-circumscribed brown macules and fleshy papules w/o concerning dermatoscopic or clinical features  - No other lesions of concern on areas examined.     Medications:  Current Outpatient Medications   Medication     aspirin 81 MG tablet     blood glucose (NO BRAND SPECIFIED) test strip     calcium-vitamin D (CALTRATE) 600-400 MG-UNIT per tablet     lisinopril (ZESTRIL) 10 MG tablet     metFORMIN (GLUCOPHAGE-XR) 500 MG 24 hr tablet     metoprolol succinate ER (TOPROL-XL) 50 MG 24 hr tablet     Multiple Vitamin (MULTIVITAMINS PO)     nitroGLYcerin (NITROSTAT) 0.4 MG sublingual tablet     rosuvastatin (CRESTOR) 20 MG tablet     sildenafil (REVATIO) 20 MG tablet     vitamin D3 (CHOLECALCIFEROL) 10 MCG (400 UNIT) capsule     No current facility-administered medications for this visit.      Past Medical History:   Patient Active Problem List   Diagnosis     Numerous moles     Hyperlipidemia with target LDL less  than 100     Venous (peripheral) insufficiency     ED (erectile dysfunction)     CKD (chronic kidney disease) stage 2, GFR 60-89 ml/min     Hypertension, benign essential, goal below 140/90     Old myocardial infarction     Type 2 diabetes mellitus with stage 2 chronic kidney disease, without long-term current use of insulin (H)     BMI 33.0-33.9,adult     Past Medical History:   Diagnosis Date     BMI 33.0-33.9,adult 4/16/2019     CKD (chronic kidney disease) stage 2, GFR 60-89 ml/min 10/9/2015     Diabetes (H)      Heart disease      Hypertension      Open left ankle fracture ~'05    infected       CC Nabeel Lowe MD  [address]  [place] [address] on close of this encounter.

## 2022-04-28 NOTE — NURSING NOTE
Dermatology Rooming Note    Yimi Mcqueen's goals for this visit include:   Chief Complaint   Patient presents with     Skin Check     Lilli Clark, Visit Facilitator

## 2022-04-28 NOTE — PROGRESS NOTES
[facility] Dermatology Note  Encounter Date: Apr 28, 2022  Office Visit     Dermatology Problem List:  1. AK - left lower lip - LN2  2. Recurrent herpes labialis - 2g BID valtrex prn    ____________________________________________    Assessment & Plan:     # AK - left lower lip.  - Cryotherapy performed today (see procedure note(s) below).  - Will start valtrex 2g twice daily x 1 days - reviewed side effects     # Recurrent herpes labialis   - Additional 2g BID valtrex prn fills provided    # Benign lesions (cherry angiomas, SKs, lentigines, clinically banal appearing melanocytic nevi)  - ABCDs of melanoma were discussed and self skin checks were advised.  - Signs and symptoms of NMSC discussed  - Sun precaution was advised including the use of sun screens of SPF 30 or higher, sun protective clothing, and avoidance of tanning beds.  - Ultimately reassurance provided and benign nature of condition discussed      Procedures Performed:   - Cryotherapy procedure note, location(s): left lower lip. After verbal consent and discussion of risks and benefits including, but not limited to, dyspigmentation/scar, blister, and pain, 1 lesion(s) was(were) treated with 1-2 mm freeze border for 1-2 cycles with liquid nitrogen. Post cryotherapy instructions were provided.    Follow-up: 1 year(s) in-person, or earlier for new or changing lesions    Staff and Resident:     This patient was staffed with Dr. Mcbride.    Hamzah Guzman MD  Internal Medicine - Dermatology PGY 4    Patient was seen and examined with the medicine/dermatology resident. I agree with the history, review of systems, physical examination, assessments and plan. I was present for the entire cryotherapy procedure.    Freya Mcbride MD  Professor and  Chair  Department of Dermatology  [facility]  ____________________________________________    CC: Skin Check    HPI:  Mr. Yimi Mcqueen is a(n) 66 year old male who presents  today as a new patient for skin check  - Multiple moles  - Avid cyclist and not good with sun protection  - Gets a flaky spot on the lower lip  - Gets intermittent cold sores - 2-3x/year   - Patient is otherwise feeling well, without additional skin concerns.    Labs Reviewed:  N/A    Physical Exam:  GEN: Pleasant male, in NAD  SKIN: Skin, which includes the head/face, both arms, chest, back, abdomen,both legs, digits and/or nails, was examined.  -Scattered erythematous gritty papules on left lower lip  -Homogenous tan macules on sun exposed skin  -Scattered bright red domed papules on trunk and extremities  -Scattered waxy stuck-on papules and brown macules w/o concerning clinical features  -Well-circumscribed brown macules and fleshy papules w/o concerning dermatoscopic or clinical features  - No other lesions of concern on areas examined.     Medications:  Current Outpatient Medications   Medication     aspirin 81 MG tablet     blood glucose (NO BRAND SPECIFIED) test strip     calcium-vitamin D (CALTRATE) 600-400 MG-UNIT per tablet     lisinopril (ZESTRIL) 10 MG tablet     metFORMIN (GLUCOPHAGE-XR) 500 MG 24 hr tablet     metoprolol succinate ER (TOPROL-XL) 50 MG 24 hr tablet     Multiple Vitamin (MULTIVITAMINS PO)     nitroGLYcerin (NITROSTAT) 0.4 MG sublingual tablet     rosuvastatin (CRESTOR) 20 MG tablet     sildenafil (REVATIO) 20 MG tablet     vitamin D3 (CHOLECALCIFEROL) 10 MCG (400 UNIT) capsule     No current facility-administered medications for this visit.      Past Medical History:   Patient Active Problem List   Diagnosis     Numerous moles     Hyperlipidemia with target LDL less than 100     Venous (peripheral) insufficiency     ED (erectile dysfunction)     CKD (chronic kidney disease) stage 2, GFR 60-89 ml/min     Hypertension, benign essential, goal below 140/90     Old myocardial infarction     Type 2 diabetes mellitus with stage 2 chronic kidney disease, without long-term current use of insulin  (H)     BMI 33.0-33.9,adult     Past Medical History:   Diagnosis Date     BMI 33.0-33.9,adult 4/16/2019     CKD (chronic kidney disease) stage 2, GFR 60-89 ml/min 10/9/2015     Diabetes (H)      Heart disease      Hypertension      Open left ankle fracture ~'05    infected       CC Nabeel Lowe MD  [address]  [place] [address] on close of this encounter.

## 2022-04-28 NOTE — PATIENT INSTRUCTIONS
Cryotherapy today. Start Valtrex 2g twice per day. I provided additional fills which you can start with the onset of a tingling sensation prior to cold sores.    Cryotherapy    What is it?  Use of a very cold liquid, such as liquid nitrogen, to freeze and destroy abnormal skin cells that need to be removed    What should I expect?  Tenderness and redness  A small blister that might grow and fill with dark purple blood. There may be crusting.  More than one treatment may be needed if the lesions do not go away.    How do I care for the treated area?  Gently wash the area with your hands when bathing.  Use a thin layer of Vaseline to help with healing. You may use a Band-Aid.   The area should heal within 7-10 days and may leave behind a pink or lighter color.   Do not use an antibiotic or Neosporin ointment.   You may take acetaminophen (Tylenol) for pain.     Call your doctor if you have:  Severe pain  Signs of infection (warmth, redness, cloudy yellow drainage, and or a bad smell)  Questions or concerns    Who should I call with questions?      [facility]: [phone]      [facility]: [phone]      For urgent needs outside of business hours call the [facility] at [phone] and ask for the dermatology resident on call

## 2022-05-02 ENCOUNTER — OFFICE VISIT (OUTPATIENT)
Dept: FAMILY MEDICINE | Facility: CLINIC | Age: 67
End: 2022-05-02
Payer: MEDICARE

## 2022-05-02 VITALS
WEIGHT: 237 LBS | TEMPERATURE: 97 F | HEART RATE: 61 BPM | OXYGEN SATURATION: 95 % | HEIGHT: 72 IN | DIASTOLIC BLOOD PRESSURE: 78 MMHG | BODY MASS INDEX: 32.1 KG/M2 | SYSTOLIC BLOOD PRESSURE: 122 MMHG

## 2022-05-02 DIAGNOSIS — I25.728 CORONARY ARTERY DISEASE OF AUTOLOGOUS BYPASS GRAFT WITH STABLE ANGINA PECTORIS (H): ICD-10-CM

## 2022-05-02 DIAGNOSIS — I10 HTN, GOAL BELOW 130/80: ICD-10-CM

## 2022-05-02 DIAGNOSIS — N18.2 CKD (CHRONIC KIDNEY DISEASE) STAGE 2, GFR 60-89 ML/MIN: ICD-10-CM

## 2022-05-02 DIAGNOSIS — E11.22 TYPE 2 DIABETES MELLITUS WITH STAGE 2 CHRONIC KIDNEY DISEASE, WITHOUT LONG-TERM CURRENT USE OF INSULIN (H): ICD-10-CM

## 2022-05-02 DIAGNOSIS — N18.2 TYPE 2 DIABETES MELLITUS WITH STAGE 2 CHRONIC KIDNEY DISEASE, WITHOUT LONG-TERM CURRENT USE OF INSULIN (H): ICD-10-CM

## 2022-05-02 DIAGNOSIS — Z00.01 ENCOUNTER FOR PREVENTATIVE ADULT HEALTH CARE EXAM WITH ABNORMAL FINDINGS: Primary | ICD-10-CM

## 2022-05-02 DIAGNOSIS — E78.5 HYPERLIPIDEMIA WITH TARGET LDL LESS THAN 100: ICD-10-CM

## 2022-05-02 PROCEDURE — G0402 INITIAL PREVENTIVE EXAM: HCPCS | Performed by: FAMILY MEDICINE

## 2022-05-02 ASSESSMENT — ACTIVITIES OF DAILY LIVING (ADL): CURRENT_FUNCTION: NO ASSISTANCE NEEDED

## 2022-05-02 NOTE — PROGRESS NOTES
"SUBJECTIVE:   Yimi Mcqueen is a 66 year old male who presents for Preventive Visit.    Patient has been advised of split billing requirements and indicates understanding: Yes  Are you in the first 12 months of your Medicare coverage?  Yes,  Visual Acuity:  Right Eye: 20/25  Left Eye: 10/10  Both Eyes: 20/20    Healthy Habits:     In general, how would you rate your overall health?  Good    Frequency of exercise:  4-5 days/week    Duration of exercise:  Greater than 60 minutes    Do you usually eat at least 4 servings of fruit and vegetables a day, include whole grains    & fiber and avoid regularly eating high fat or \"junk\" foods?  Yes    Taking medications regularly:  Yes    Medication side effects:  None    Ability to successfully perform activities of daily living:  No assistance needed    Home Safety:  Lack of grab bars in the bathroom    Hearing Impairment:  Feel that people are mumbling or not speaking clearly and difficulty understanding soft or whispered speech    In the past 6 months, have you been bothered by leaking of urine?  No    In general, how would you rate your overall mental or emotional health?  Good      PHQ-2 Total Score: 0    Additional concerns today:  Yes    Do you feel safe in your environment? Yes    Have you ever done Advance Care Planning? (For example, a Health Directive, POLST, or a discussion with a medical provider or your loved ones about your wishes): Yes, advance care planning is on file.    Fall risk  Fallen 2 or more times in the past year?: No  Any fall with injury in the past year?: No  click delete button to remove this line now  Cognitive Screening   1) Repeat 3 items (Leader, Season, Table)    2) Clock draw: NORMAL  3) 3 item recall: Recalls 3 objects  Results: NORMAL clock, 1-2 items recalled: COGNITIVE IMPAIRMENT LESS LIKELY    Mini-CogTM Copyright MURRAY Fernandez. Licensed by the author for use in [facility]; reprinted with permission (brandie@.[facility]). All " rights reserved.      Do you have sleep apnea, excessive snoring or daytime drowsiness?: no    Reviewed and updated as needed this visit by clinical staff   Tobacco                  Reviewed and updated as needed this visit by Provider                   Social History     Tobacco Use     Smoking status: Former Smoker     Packs/day: 1.50     Years: 20.00     Pack years: 30.00     Quit date: 2006     Years since quittin.3     Smokeless tobacco: Never Used   Substance Use Topics     Alcohol use: Yes     Comment: Occ.      If you drink alcohol do you typically have >3 drinks per day or >7 drinks per week? No    No flowsheet data found.    Diabetes Follow-up    How often are you checking your blood sugar? Two times daily  Blood sugar testing frequency justification:  Adjustment of medication(s) and Patient modifying lifestyle changes (diet, exercise) with blood sugars  What time of day are you checking your blood sugars (select all that apply)?  Before and after meals  Have you had any blood sugars above 200?  No  Have you had any blood sugars below 70?  No    What symptoms do you notice when your blood sugar is low?  Shaky, Dizzy and Weak    What concerns do you have today about your diabetes? None     Do you have any of these symptoms? (Select all that apply)  No numbness or tingling in feet.  No redness, sores or blisters on feet.  No complaints of excessive thirst.  No reports of blurry vision.  No significant changes to weight.    Have you had a diabetic eye exam in the last 12 months? yes    Hyperlipidemia Follow-Up      Are you regularly taking any medication or supplement to lower your cholesterol?   Yes- atorvastatin    Are you having muscle aches or other side effects that you think could be caused by your cholesterol lowering medication?  No    Hypertension Follow-up      Do you check your blood pressure regularly outside of the clinic? No     Are you following a low salt diet? Yes    Are your blood  pressures ever more than 140 on the top number (systolic) OR more   than 90 on the bottom number (diastolic), for example 140/90? No    BP Readings from Last 2 Encounters:   05/02/22 122/78   05/21/21 100/70     Hemoglobin A1C POCT (%)   Date Value   05/21/2021 6.8 (H)   08/05/2020 6.4 (H)     Hemoglobin A1C (%)   Date Value   04/01/2022 6.6 (H)     LDL Cholesterol Calculated (mg/dL)   Date Value   04/01/2022 69   05/21/2021 81   02/19/2020 72     LDL Cholesterol Direct (mg/dL)   Date Value   08/05/2020 66       Vascular Disease Follow-up      How often do you take nitroglycerin? Never    Do you take an aspirin every day? Yes    Chronic Kidney Disease Follow-up      Do you take any over the counter pain medicine?: No      Current providers sharing in care for this patient include:  Patient Care Team:  Nabeel Lowe MD as PCP - General (Family Practice)  Anand Jenkins [facility] as Pharmacist (Pharmacist)  Nabeel Lowe MD as Assigned PCP  Ruben Cutler MD as Resident (Internal Medicine)    The following health maintenance items are reviewed in Epic and correct as of today:  Health Maintenance Due   Topic Date Due     DIABETIC FOOT EXAM  Never done     EYE EXAM  06/01/2020     FALL RISK ASSESSMENT  Never done     AORTIC ANEURYSM SCREENING (SYSTEM ASSIGNED)  Never done     Labs reviewed in EPIC    Review of Systems  Constitutional, HEENT, cardiovascular, pulmonary, gi and gu systems are negative, except as otherwise noted.    OBJECTIVE:   /78   Pulse 61   Temp 97  F (36.1  C) (Temporal)   Ht 1.829 m (6')   Wt 107.5 kg (237 lb)   SpO2 95%   BMI 32.14 kg/m   Estimated body mass index is 32.14 kg/m  as calculated from the following:    Height as of this encounter: 1.829 m (6').    Weight as of this encounter: 107.5 kg (237 lb).  Physical Exam  GENERAL: healthy, alert and no distress  EYES: Eyes grossly normal to inspection, PERRL and conjunctivae and sclerae normal  HENT: ear canals and  TM's normal, nose and mouth without ulcers or lesions  NECK: no adenopathy, no asymmetry, masses, or scars and thyroid normal to palpation  RESP: lungs clear to auscultation - no rales, rhonchi or wheezes  CV: regular rate and rhythm, normal S1 S2, no S3 or S4, no murmur, click or rub, no peripheral edema and peripheral pulses strong  ABDOMEN: soft, nontender, no hepatosplenomegaly, no masses and bowel sounds normal   (male): normal male genitalia without lesions or urethral discharge, no hernia  RECTAL: normal sphincter tone, no rectal masses, prostate normal size, smooth, nontender without nodules or masses  MS: no gross musculoskeletal defects noted, no edema  SKIN: no suspicious lesions or rashes  NEURO: Normal strength and tone, mentation intact and speech normal  PSYCH: mentation appears normal, affect normal/bright    Diagnostic Test Results:  Labs reviewed in Epic    ASSESSMENT / PLAN:   (Z00.01) Encounter for preventative adult health care exam with abnormal findings  (primary encounter diagnosis)  Comment: doing well   Plan: continue     (I25.728) 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)  Comment: asymptomatic   Plan: same    (E11.22,  N18.2) Type 2 diabetes mellitus with stage 2 chronic kidney disease, without long-term current use of insulin (H)  Comment: at goal   Plan: same    (N18.2) CKD (chronic kidney disease) stage 2, GFR 60-89 ml/min  Comment: stable  Plan: unknown     (I10) HTN, goal below 130/80  Comment: at goal   Plan: continue     (E78.5) Hyperlipidemia with target LDL less than 100  Comment: at goal   Plan: continue     COUNSELING:  Reviewed preventive health counseling, as reflected in patient instructions       Regular exercise       Healthy diet/nutrition       Vision screening       Hearing screening       Colon cancer screening       Prostate cancer screening    Estimated body mass index is 32.14 kg/m  as calculated from the following:     Height as of this encounter: 1.829 m (6').    Weight as of this encounter: 107.5 kg (237 lb).    Weight management plan: Discussed healthy diet and exercise guidelines    He reports that he quit smoking about 16 years ago. He has a 30.00 pack-year smoking history. He has never used smokeless tobacco.      Appropriate preventive services were discussed with this patient, including applicable screening as appropriate for cardiovascular disease, diabetes, osteopenia/osteoporosis, and glaucoma.  As appropriate for age/gender, discussed screening for colorectal cancer, prostate cancer, breast cancer, and cervical cancer. Checklist reviewing preventive services available has been given to the patient.    Reviewed patients plan of care and provided an AVS. The Basic Care Plan (routine screening as documented in Health Maintenance) for Yimi meets the Care Plan requirement. This Care Plan has been established and reviewed with the Patient.        Nabeel Lowe MD  [facility] [place]    Identified Health Risks:

## 2022-08-22 ENCOUNTER — DOCUMENTATION ONLY (OUTPATIENT)
Dept: OTHER | Facility: CLINIC | Age: 67
End: 2022-08-22

## 2022-08-30 ENCOUNTER — MYC MEDICAL ADVICE (OUTPATIENT)
Dept: FAMILY MEDICINE | Facility: CLINIC | Age: 67
End: 2022-08-30

## 2022-08-30 NOTE — TELEPHONE ENCOUNTER
Please see [employer] message - upload Healthcare directive to chart    Thank you  Nesha Lorenzana RN  [facility]

## 2022-10-01 DIAGNOSIS — I10 HYPERTENSION, BENIGN ESSENTIAL, GOAL BELOW 140/90: ICD-10-CM

## 2022-10-01 DIAGNOSIS — E11.9 TYPE 2 DIABETES MELLITUS WITHOUT COMPLICATION, WITHOUT LONG-TERM CURRENT USE OF INSULIN (H): ICD-10-CM

## 2022-10-01 DIAGNOSIS — E78.5 HYPERLIPIDEMIA WITH TARGET LDL LESS THAN 100: ICD-10-CM

## 2022-10-01 DIAGNOSIS — I25.2 OLD MYOCARDIAL INFARCTION: ICD-10-CM

## 2022-10-03 RX ORDER — ROSUVASTATIN CALCIUM 20 MG/1
TABLET, COATED ORAL
Qty: 90 TABLET | Refills: 3 | Status: SHIPPED | OUTPATIENT
Start: 2022-10-03 | End: 2023-10-05

## 2022-10-03 RX ORDER — METOPROLOL SUCCINATE 50 MG/1
TABLET, EXTENDED RELEASE ORAL
Qty: 90 TABLET | Refills: 3 | Status: SHIPPED | OUTPATIENT
Start: 2022-10-03 | End: 2023-10-05

## 2022-10-03 NOTE — TELEPHONE ENCOUNTER
"Requested Prescriptions   Pending Prescriptions Disp Refills     metFORMIN (GLUCOPHAGE XR) 500 MG 24 hr tablet [Pharmacy Med Name: METFORMIN HCL  MG TABLET] 270 tablet 3     Sig: TAKE 1 TABLET BY MOUTH THREE TIMES A DAY WITH MEALS       Biguanide Agents Failed - 10/1/2022  3:00 PM        Failed - Patient has documented A1c within the specified period of time.     If HgbA1C is 8 or greater, it needs to be on file within the past 3 months.  If less than 8, must be on file within the past 6 months.     Recent Labs   Lab Test 04/01/22 0947   A1C 6.6*             Passed - Patient is age 10 or older        Passed - Patient's CR is NOT>1.4 OR Patient's EGFR is NOT<45 within past 12 mos.     Recent Labs   Lab Test 04/01/22 0947 05/21/21  1435   GFRESTIMATED >90 >90   GFRESTBLACK  --  >90       Recent Labs   Lab Test 04/01/22 0947   CR 0.82             Passed - Patient does NOT have a diagnosis of CHF.        Passed - Medication is active on med list        Passed - Recent (6 mo) or future (30 days) visit within the authorizing provider's specialty     Patient had office visit in the last 6 months or has a visit in the next 30 days with authorizing provider or within the authorizing provider's specialty.  See \"Patient Info\" tab in inbasket, or \"Choose Columns\" in Meds & Orders section of the refill encounter.               metoprolol succinate ER (TOPROL XL) 50 MG 24 hr tablet [Pharmacy Med Name: METOPROLOL SUCC ER 50 MG TAB] 90 tablet 3     Sig: TAKE 1 TABLET BY MOUTH EVERY DAY       Beta-Blockers Protocol Passed - 10/1/2022  3:00 PM        Passed - Blood pressure under 140/90 in past 12 months     BP Readings from Last 3 Encounters:   05/02/22 122/78   05/21/21 100/70   02/19/20 100/70                 Passed - Patient is age 6 or older        Passed - Recent (12 mo) or future (30 days) visit within the authorizing provider's specialty     Patient has had an office visit with the authorizing provider or a provider " "within the authorizing providers department within the previous 12 mos or has a future within next 30 days. See \"Patient Info\" tab in inbasket, or \"Choose Columns\" in Meds & Orders section of the refill encounter.              Passed - Medication is active on med list           rosuvastatin (CRESTOR) 20 MG tablet [Pharmacy Med Name: ROSUVASTATIN CALCIUM 20 MG TAB] 90 tablet 3     Sig: TAKE 1 TABLET BY MOUTH EVERY DAY       Statins Protocol Passed - 10/1/2022  3:00 PM        Passed - LDL on file in past 12 months     Recent Labs   Lab Test 04/01/22  0947   LDL 69             Passed - No abnormal creatine kinase in past 12 months     Recent Labs   Lab Test 12/07/18  1009                   Passed - Recent (12 mo) or future (30 days) visit within the authorizing provider's specialty     Patient has had an office visit with the authorizing provider or a provider within the authorizing providers department within the previous 12 mos or has a future within next 30 days. See \"Patient Info\" tab in inbasket, or \"Choose Columns\" in Meds & Orders section of the refill encounter.              Passed - Medication is active on med list        Passed - Patient is age 18 or older           Prescription approved per [facility] Refill Protocol.  Metoprolol and rosuvastatin passed protocol    Routing refill request to provider for review/approval because:  Labs out of range:  A1C      Nesha Lorenzana RN  [facility]     "

## 2022-10-04 RX ORDER — METFORMIN HCL 500 MG
TABLET, EXTENDED RELEASE 24 HR ORAL
Qty: 270 TABLET | Refills: 3 | Status: SHIPPED | OUTPATIENT
Start: 2022-10-04 | End: 2022-10-04

## 2022-10-04 RX ORDER — METFORMIN HCL 500 MG
TABLET, EXTENDED RELEASE 24 HR ORAL
Qty: 270 TABLET | Refills: 3 | Status: SHIPPED | OUTPATIENT
Start: 2022-10-04 | End: 2023-10-10

## 2022-10-04 NOTE — TELEPHONE ENCOUNTER
Prescribing error per pharmacy. Medication had to be resent.     EDWARD COBOS RN  [facility]

## 2022-10-22 ENCOUNTER — HEALTH MAINTENANCE LETTER (OUTPATIENT)
Age: 67
End: 2022-10-22

## 2022-11-02 ENCOUNTER — TELEPHONE (OUTPATIENT)
Dept: FAMILY MEDICINE | Facility: CLINIC | Age: 67
End: 2022-11-02

## 2022-11-02 ENCOUNTER — OFFICE VISIT (OUTPATIENT)
Dept: FAMILY MEDICINE | Facility: CLINIC | Age: 67
End: 2022-11-02
Payer: MEDICARE

## 2022-11-02 DIAGNOSIS — E11.22 TYPE 2 DIABETES MELLITUS WITH STAGE 2 CHRONIC KIDNEY DISEASE, WITHOUT LONG-TERM CURRENT USE OF INSULIN (H): Primary | ICD-10-CM

## 2022-11-02 DIAGNOSIS — N18.2 TYPE 2 DIABETES MELLITUS WITH STAGE 2 CHRONIC KIDNEY DISEASE, WITHOUT LONG-TERM CURRENT USE OF INSULIN (H): Primary | ICD-10-CM

## 2022-11-02 DIAGNOSIS — I25.728 CORONARY ARTERY DISEASE OF AUTOLOGOUS BYPASS GRAFT WITH STABLE ANGINA PECTORIS (H): ICD-10-CM

## 2022-11-02 DIAGNOSIS — E11.9 TYPE 2 DIABETES MELLITUS WITHOUT COMPLICATION, WITHOUT LONG-TERM CURRENT USE OF INSULIN (H): Primary | ICD-10-CM

## 2022-11-02 PROCEDURE — 99213 OFFICE O/P EST LOW 20 MIN: CPT | Performed by: FAMILY MEDICINE

## 2022-11-02 NOTE — TELEPHONE ENCOUNTER
"Dr. Lowe  -   Patient requesting labs rechecked at [facility] appt (A1C and albumin)    Last visit notes 4/1/22:   \"the elevated loss of albumen in urine should be rechecked at your May appointment.\"    Orders t'd    Thank you.   Nesha Lorenzana RN  [place]   "

## 2022-11-02 NOTE — PROGRESS NOTES
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    Weight as of 5/2/22: 107.5 kg (237 lb).   Weight management plan: Discussed healthy diet and exercise guidelines    Patient Instructions   Recommend filing a copy of healthcare directive with us, may need to update in 5 years.      Return in about 3 months (around 2/2/2023) for Lab Work.    Nabeel Lowe MD  [facility] ERIC Geller is a 67 year old accompanied by his spouse, presenting for the following health issues:  *_* Health Care Directive *_*      HPI     Diabetes Follow-up    How often are you checking your blood sugar? Two times daily  Blood sugar testing frequency justification:  Adjustment of medication(s) and Patient modifying lifestyle changes (diet, exercise) with blood sugars  What time of day are you checking your blood sugars (select all that apply)?  Before and after meals  Have you had any blood sugars above 200?  No  Have you had any blood sugars below 70?  No    What symptoms do you notice when your blood sugar is low?  None    What concerns do you have today about your diabetes? None     Do you have any of these symptoms? (Select all that apply)  No numbness or tingling in feet.  No redness, sores or blisters on feet.  No complaints of excessive thirst.  No reports of blurry vision.  No significant changes to weight.    Have you had a diabetic eye exam in the last 12 months? No        BP Readings from Last 2  Encounters:   05/02/22 122/78   05/21/21 100/70     Hemoglobin A1C (%)   Date Value   04/01/2022 6.6 (H)   05/21/2021 6.8 (H)   08/05/2020 6.4 (H)     LDL Cholesterol Calculated (mg/dL)   Date Value   04/01/2022 69   05/21/2021 81   02/19/2020 72     LDL Cholesterol Direct (mg/dL)   Date Value   08/05/2020 66               Vascular Disease Follow-up      How often do you take nitroglycerin? Never    Do you take an aspirin every day? Yes      How many days per week do you miss taking your medication? 0    Wants to make sure that individuals in the healthcare system, ie in the hospital, as well as transitional care, will honor wishes not to be resuscitated.  Feels strongly about not wanting long-term care in a nursing facility    Review of Systems   Constitutional, HEENT, cardiovascular, pulmonary, gi and gu systems are negative, except as otherwise noted.      Objective    There were no vitals taken for this visit.  There is no height or weight on file to calculate BMI.  Physical Exam   GENERAL: healthy, alert and no distress  RESP: lungs clear to auscultation - no rales, rhonchi or wheezes  CV: regular rate and rhythm, normal S1 S2, no S3 or S4, no murmur, click or rub, no peripheral edema and peripheral pulses strong  PSYCH: mentation appears normal, affect normal/bright

## 2022-11-10 ENCOUNTER — LAB (OUTPATIENT)
Dept: LAB | Facility: CLINIC | Age: 67
End: 2022-11-10
Payer: MEDICARE

## 2022-11-10 DIAGNOSIS — E11.9 TYPE 2 DIABETES MELLITUS WITHOUT COMPLICATION, WITHOUT LONG-TERM CURRENT USE OF INSULIN (H): ICD-10-CM

## 2022-11-10 LAB
CREAT UR-MCNC: 17 MG/DL
HBA1C MFR BLD: 6.9 % (ref 0–5.6)
MICROALBUMIN UR-MCNC: <5 MG/L
MICROALBUMIN/CREAT UR: NORMAL MG/G{CREAT}

## 2022-11-10 PROCEDURE — 82043 UR ALBUMIN QUANTITATIVE: CPT

## 2022-11-10 PROCEDURE — 83036 HEMOGLOBIN GLYCOSYLATED A1C: CPT

## 2022-11-10 PROCEDURE — 36415 COLL VENOUS BLD VENIPUNCTURE: CPT

## 2022-11-22 ENCOUNTER — TRANSFERRED RECORDS (OUTPATIENT)
Dept: HEALTH INFORMATION MANAGEMENT | Facility: CLINIC | Age: 67
End: 2022-11-22

## 2023-01-06 DIAGNOSIS — I10 HYPERTENSION, BENIGN ESSENTIAL, GOAL BELOW 140/90: ICD-10-CM

## 2023-01-06 RX ORDER — LISINOPRIL 10 MG/1
TABLET ORAL
Qty: 90 TABLET | Refills: 3 | Status: SHIPPED | OUTPATIENT
Start: 2023-01-06 | End: 2024-01-02

## 2023-02-02 ENCOUNTER — MYC MEDICAL ADVICE (OUTPATIENT)
Dept: PHARMACY | Facility: CLINIC | Age: 68
End: 2023-02-02
Payer: MEDICARE

## 2023-02-02 NOTE — TELEPHONE ENCOUNTER
Steve:     It's been a while!  I have a brief question for you.       2.  I have been on Medforman (Type 2 diabetes) for 10 years or so & have read that newer meds may be preferred now as they provide additional heart and kidney protection.  My A1C has been in the low 6's and my weight is about the same.        Thoughts on switching to one of the below:  Dapagliflozin (Farxiga )    empagliflozin (Jardiance )      Would you recommend that I get a consult with an Endo Doc or could Dr Lowe handle this?     Thanks!     Yimi knowles will let yimi know --yes sglt-2 meds would be a good choice but $$$$.    Anand Jenkins [facility].  Medication Therapy Management Provider  [phone]

## 2023-02-28 ENCOUNTER — VIRTUAL VISIT (OUTPATIENT)
Dept: PHARMACY | Facility: CLINIC | Age: 68
End: 2023-02-28
Payer: COMMERCIAL

## 2023-02-28 DIAGNOSIS — I25.2 OLD MYOCARDIAL INFARCTION: ICD-10-CM

## 2023-02-28 DIAGNOSIS — B00.1 COLD SORE: ICD-10-CM

## 2023-02-28 DIAGNOSIS — E78.5 HYPERLIPIDEMIA WITH TARGET LDL LESS THAN 100: ICD-10-CM

## 2023-02-28 DIAGNOSIS — I10 HTN, GOAL BELOW 130/80: ICD-10-CM

## 2023-02-28 DIAGNOSIS — N18.2 TYPE 2 DIABETES MELLITUS WITH STAGE 2 CHRONIC KIDNEY DISEASE, WITHOUT LONG-TERM CURRENT USE OF INSULIN (H): Primary | ICD-10-CM

## 2023-02-28 DIAGNOSIS — E11.22 TYPE 2 DIABETES MELLITUS WITH STAGE 2 CHRONIC KIDNEY DISEASE, WITHOUT LONG-TERM CURRENT USE OF INSULIN (H): Primary | ICD-10-CM

## 2023-02-28 DIAGNOSIS — N52.9 ERECTILE DYSFUNCTION, UNSPECIFIED ERECTILE DYSFUNCTION TYPE: ICD-10-CM

## 2023-02-28 PROCEDURE — 99207 PR NO CHARGE LOS: CPT | Mod: VID | Performed by: PHARMACIST

## 2023-02-28 NOTE — PATIENT INSTRUCTIONS
"Recommendations from today's MTM visit:                                                    MTM (medication therapy management) is a service provided by a clinical pharmacist designed to help you get the most of out of your medicines.      Continue current medications as prescribed.     We discussed potentially starting Jardiance to help manage your diabetes and prevent further kidney damage/heart damage. At this time your blood sugars look great and you do not need to start a new medication. There are pros & cons of starting Jardiance including adding another kidney/cardio protective agent and cons of potential low blood sugar, increased urination, etc. If in the future you do wish to start this medication or one similar please reach out.    Follow-up: As needed.    It was great speaking with you today.  I value your experience and would be very thankful for your time in providing feedback in our clinic survey. In the next few days, you may receive an email or text message from [employer] with a link to a survey related to your  clinical pharmacist.\"     To schedule another MTM appointment, please call the clinic directly or you may call the MTM scheduling line at [phone] or toll-free at [phone].     My Clinical Pharmacist's contact information:                                                      Please feel free to contact me with any questions or concerns you have.      Jessica Durham, PharmD, BCACP   Medication Management Pharmacist   [facility]  [phone]     "

## 2023-02-28 NOTE — PROGRESS NOTES
Medication Therapy Management (MTM) Encounter    ASSESSMENT:                            Medication Adherence/Access: No issues identified    Type 2 Diabetes: Stable. Patient's is at goal A1c <8% and more stringent goal <7%.  Patient's diabetes is currently well controlled with metformin. No compelling indication at this time to add SGLT2. Discussed SGLT2 as a potential 2nd medication if needed in the future to improve glucose control.     Hypertension/Post MI: Stable. Blood pressures have been at goal of <130/80 mmHg.    Hyperlipidemia: Stable. Patient is on high intensity statin post MI which is appropriate.    Erectile Dysfunction: Stable.     Cold Sores: Stable.    Supplements: Stable.    PLAN:                            Continue current therapies as prescribed. Reach out in the future with any questions.    Follow-up: As needed.    SUBJECTIVE/OBJECTIVE:                          Yimi Mcqueen is a 67 year old male contacted via secure video for an initial visit. He was referred to me from Nabeel Lowe.      Reason for visit: wonders about starting a new diabetes medication - SGLT2.    Allergies/ADRs: Reviewed in chart  Past Medical History: Reviewed in chart  Tobacco: He reports that he quit smoking about 17 years ago. He has a 30.00 pack-year smoking history. He has never used smokeless tobacco.  Alcohol: 1-3 beverages / week    Medication Adherence/Access: no issues reported    Type 2 Diabetes:  Currently taking Metformin  mg 2 tabs AM and 1 tab PM. Patient is not experiencing side effects. Previously had muscle cramping but resolved with increasing water intake. Currently drinks about 1 gallon liquid per day.  He is wondering if he should start Jardiance or Farxiga for the cardiovascular and renal benefits. History of prior MI and diagnosis CKD stage 2.  Blood sugar monitoring: rarely. Ranges (patient reported): typically 130s. Lowest 105 and highest 180.  Symptoms of low blood sugar?  none  Symptoms of high blood sugar? none  Eye exam: Due due  Foot exam: Due due Neuropathy  Diet/Exercise: primarily biking, 4x/week. Has kept bread and pasta intake low.  Aspirin: Taking 81mg daily for secondary prevention   Statin: Yes: rosuvastatin   ACEi/ARB: Yes: lisinopril.   Urine Albumin:   Lab Results   Component Value Date    UMALCR  11/10/2022      Comment:      Unable to calculate, urine albumin or creatinine is outside the detectable limits.      Lab Results   Component Value Date    A1C 6.9 11/10/2022    A1C 6.6 04/01/2022    A1C 6.8 05/21/2021    A1C 6.4 08/05/2020    A1C 5.9 02/19/2020    A1C 6.1 04/15/2019    A1C 6.2 05/31/2018       Hypertension/Post MI: Current medications include lisinopril 10 mg daily, metoprolol XL 50 mg daily, aspirin 81 mg daily, nitroglycerin 0.4 mg SL as directed (has on hand, hasn't needed to use it).  Patient does self-monitor blood pressure. Home BP monitoring in range of 120's systolic over 60's diastolic.  Patient reports no current medication side effects. Before increasing fluid intake, lightheaded and low pulse but has resolved.  BP Readings from Last 3 Encounters:   05/02/22 122/78   05/21/21 100/70   02/19/20 100/70     Hyperlipidemia: Current therapy includes rosuvastatin 20 mg daily.  Patient reports no significant myalgias or other side effects.    Recent Labs   Lab Test 04/01/22  0947 05/21/21  1435 01/06/17  1007 09/25/15  1118   CHOL 130 154   < > 237*   HDL 42 49   < > 39*   LDL 69 81   < > 145*   TRIG 97 119   < > 265*   CHOLHDLRATIO  --   --   --  6.1*    < > = values in this interval not displayed.       Erectile Dysfunction:  Current medications include: Sildenafil 20 mg 1-2 tabs as needed.  Not using very often. Aware of interaction with Nitroglycerin.    Cold Sores:  Current medications include: valacyclovir 2000 mg 2 times daily for 1 day as needed. Hasn't taken in >1 yr.    Supplements:  Current medications include: Multivitamin (Centrum silver)  daily, vitamin D3 once daily 5000u, calcium-vitamin D 600 mg-400 unit daily.      Lab Results   Component Value Date    VITDT 58 04/01/2022    VITDT 66 08/05/2020    VITDT 77 (H) 04/15/2019    VITDT 39 05/31/2018      Today's Vitals: There were no vitals taken for this visit.  ----------------      I spent 40 minutes with this patient today.  A copy of the visit note was provided to the patient's provider(s).    A summary of these recommendations was sent via [employer].    Matt Fowler, PharmD student  Jessica Durham, AgustinaD, BCACP     Telemedicine Visit Details  Type of service:  Video Conference via [employer]  Start Time: 9:05 AM  End Time: 9:43 AM     Medication Therapy Recommendations  No medication therapy recommendations to display

## 2023-04-05 ENCOUNTER — TELEPHONE (OUTPATIENT)
Dept: FAMILY MEDICINE | Facility: CLINIC | Age: 68
End: 2023-04-05
Payer: MEDICARE

## 2023-04-05 DIAGNOSIS — I25.2 OLD MYOCARDIAL INFARCTION: ICD-10-CM

## 2023-04-05 DIAGNOSIS — Z00.01 ENCOUNTER FOR PREVENTATIVE ADULT HEALTH CARE EXAM WITH ABNORMAL FINDINGS: ICD-10-CM

## 2023-04-05 DIAGNOSIS — Z12.5 SCREENING FOR PROSTATE CANCER: ICD-10-CM

## 2023-04-05 DIAGNOSIS — E11.9 TYPE 2 DIABETES MELLITUS WITHOUT COMPLICATION, WITHOUT LONG-TERM CURRENT USE OF INSULIN (H): Primary | ICD-10-CM

## 2023-04-05 NOTE — TELEPHONE ENCOUNTER
Dr. Lowe -   Patient requesting labs done prior to annual appt with you and Cardiologist 4/20/23  Will schedule annual with PCP after cardiology appt    Labs T'd    Nesha REDD RN  [facility]

## 2023-04-06 NOTE — TELEPHONE ENCOUNTER
Wife calling to to check on status of requested labs - informed message has been sent to PCP and will call as soon as signed     Nesha REDD RN  [facility]

## 2023-04-11 ENCOUNTER — MYC MEDICAL ADVICE (OUTPATIENT)
Dept: FAMILY MEDICINE | Facility: CLINIC | Age: 68
End: 2023-04-11
Payer: MEDICARE

## 2023-04-11 DIAGNOSIS — E55.9 VITAMIN D DEFICIENCY: Primary | ICD-10-CM

## 2023-04-11 DIAGNOSIS — I25.728 CORONARY ARTERY DISEASE OF AUTOLOGOUS BYPASS GRAFT WITH STABLE ANGINA PECTORIS (H): ICD-10-CM

## 2023-04-14 ENCOUNTER — TELEPHONE (OUTPATIENT)
Dept: FAMILY MEDICINE | Facility: CLINIC | Age: 68
End: 2023-04-14

## 2023-04-14 ENCOUNTER — LAB (OUTPATIENT)
Dept: LAB | Facility: CLINIC | Age: 68
End: 2023-04-14
Payer: MEDICARE

## 2023-04-14 DIAGNOSIS — E55.9 VITAMIN D DEFICIENCY: ICD-10-CM

## 2023-04-14 DIAGNOSIS — Z12.5 SCREENING FOR PROSTATE CANCER: ICD-10-CM

## 2023-04-14 DIAGNOSIS — I25.2 OLD MYOCARDIAL INFARCTION: ICD-10-CM

## 2023-04-14 DIAGNOSIS — Z00.01 ENCOUNTER FOR PREVENTATIVE ADULT HEALTH CARE EXAM WITH ABNORMAL FINDINGS: ICD-10-CM

## 2023-04-14 DIAGNOSIS — E11.9 TYPE 2 DIABETES MELLITUS WITHOUT COMPLICATION, WITHOUT LONG-TERM CURRENT USE OF INSULIN (H): ICD-10-CM

## 2023-04-14 DIAGNOSIS — I25.728 CORONARY ARTERY DISEASE OF AUTOLOGOUS BYPASS GRAFT WITH STABLE ANGINA PECTORIS (H): ICD-10-CM

## 2023-04-14 LAB
ALBUMIN SERPL BCG-MCNC: 4.6 G/DL (ref 3.5–5.2)
ALP SERPL-CCNC: 46 U/L (ref 40–129)
ALT SERPL W P-5'-P-CCNC: 35 U/L (ref 10–50)
ANION GAP SERPL CALCULATED.3IONS-SCNC: 13 MMOL/L (ref 7–15)
AST SERPL W P-5'-P-CCNC: 41 U/L (ref 10–50)
BILIRUB SERPL-MCNC: 1.3 MG/DL
BUN SERPL-MCNC: 23.2 MG/DL (ref 8–23)
CALCIUM SERPL-MCNC: 9.6 MG/DL (ref 8.8–10.2)
CHLORIDE SERPL-SCNC: 105 MMOL/L (ref 98–107)
CHOLEST SERPL-MCNC: 143 MG/DL
CREAT SERPL-MCNC: 0.74 MG/DL (ref 0.67–1.17)
CREAT UR-MCNC: 24.8 MG/DL
CRP SERPL HS-MCNC: 0.49 MG/L
DEPRECATED HCO3 PLAS-SCNC: 22 MMOL/L (ref 22–29)
GFR SERPL CREATININE-BSD FRML MDRD: >90 ML/MIN/1.73M2
GLUCOSE SERPL-MCNC: 155 MG/DL (ref 70–99)
HBA1C MFR BLD: 7.1 % (ref 0–5.6)
HDLC SERPL-MCNC: 43 MG/DL
LDLC SERPL CALC-MCNC: 86 MG/DL
MICROALBUMIN UR-MCNC: <12 MG/L
MICROALBUMIN/CREAT UR: NORMAL MG/G{CREAT}
NONHDLC SERPL-MCNC: 100 MG/DL
POTASSIUM SERPL-SCNC: 4.2 MMOL/L (ref 3.4–5.3)
PROT SERPL-MCNC: 7.2 G/DL (ref 6.4–8.3)
PSA SERPL DL<=0.01 NG/ML-MCNC: 0.51 NG/ML (ref 0–4.5)
SODIUM SERPL-SCNC: 140 MMOL/L (ref 136–145)
TRIGL SERPL-MCNC: 71 MG/DL

## 2023-04-14 PROCEDURE — 80053 COMPREHEN METABOLIC PANEL: CPT

## 2023-04-14 PROCEDURE — 82570 ASSAY OF URINE CREATININE: CPT

## 2023-04-14 PROCEDURE — 80061 LIPID PANEL: CPT

## 2023-04-14 PROCEDURE — 83036 HEMOGLOBIN GLYCOSYLATED A1C: CPT

## 2023-04-14 PROCEDURE — 82043 UR ALBUMIN QUANTITATIVE: CPT

## 2023-04-14 PROCEDURE — G0103 PSA SCREENING: HCPCS

## 2023-04-14 PROCEDURE — 36415 COLL VENOUS BLD VENIPUNCTURE: CPT

## 2023-04-14 PROCEDURE — 86141 C-REACTIVE PROTEIN HS: CPT

## 2023-04-14 PROCEDURE — 82306 VITAMIN D 25 HYDROXY: CPT

## 2023-04-14 NOTE — TELEPHONE ENCOUNTER
Pt calling to have below labs added to blood draw today at 11am  Vitamin D  CRP - recommended by cardiology     Orders T'd    Nesha REDD RN  [facility]

## 2023-04-14 NOTE — TELEPHONE ENCOUNTER
Per lab message need these signed to add to collection from today.    Thanks!  David Bergeron RN   [facility]

## 2023-04-14 NOTE — TELEPHONE ENCOUNTER
Spouse calling to check on labs, advised CRP and vitamin d were signed and added to specimen that was drawn today.    David Bergeron RN   [facility]

## 2023-04-16 LAB — DEPRECATED CALCIDIOL+CALCIFEROL SERPL-MC: 61 UG/L (ref 20–75)

## 2023-04-20 ENCOUNTER — PATIENT OUTREACH (OUTPATIENT)
Dept: CARE COORDINATION | Facility: CLINIC | Age: 68
End: 2023-04-20
Payer: MEDICARE

## 2023-04-29 NOTE — RESULT ENCOUNTER NOTE
Elvin Geller: Your last set of labs showed diabetes control not at goal, but shouldn't be hard to continue same medications, and tweak diet and exercise; repeat A1c in 3 months. The rest of labs within normal limits. Contact if questions, nice to see you!     Nabeel SANTANA

## 2023-05-10 ENCOUNTER — OFFICE VISIT (OUTPATIENT)
Dept: FAMILY MEDICINE | Facility: CLINIC | Age: 68
End: 2023-05-10
Payer: MEDICARE

## 2023-05-10 VITALS
HEIGHT: 71 IN | TEMPERATURE: 97.6 F | DIASTOLIC BLOOD PRESSURE: 63 MMHG | HEART RATE: 60 BPM | RESPIRATION RATE: 17 BRPM | BODY MASS INDEX: 32.71 KG/M2 | SYSTOLIC BLOOD PRESSURE: 117 MMHG | OXYGEN SATURATION: 98 %

## 2023-05-10 DIAGNOSIS — Z12.5 SCREENING FOR PROSTATE CANCER: ICD-10-CM

## 2023-05-10 DIAGNOSIS — I25.10 CORONARY ARTERY DISEASE INVOLVING NATIVE CORONARY ARTERY OF NATIVE HEART WITHOUT ANGINA PECTORIS: ICD-10-CM

## 2023-05-10 DIAGNOSIS — I10 HTN, GOAL BELOW 140/90: ICD-10-CM

## 2023-05-10 DIAGNOSIS — I87.2 VENOUS (PERIPHERAL) INSUFFICIENCY: ICD-10-CM

## 2023-05-10 DIAGNOSIS — Z00.00 ENCOUNTER FOR PREVENTATIVE ADULT HEALTH CARE EXAMINATION: Primary | ICD-10-CM

## 2023-05-10 DIAGNOSIS — G56.03 BILATERAL CARPAL TUNNEL SYNDROME: ICD-10-CM

## 2023-05-10 DIAGNOSIS — N52.9 ERECTILE DYSFUNCTION, UNSPECIFIED ERECTILE DYSFUNCTION TYPE: ICD-10-CM

## 2023-05-10 DIAGNOSIS — E11.9 TYPE 2 DIABETES MELLITUS WITHOUT COMPLICATION, WITHOUT LONG-TERM CURRENT USE OF INSULIN (H): ICD-10-CM

## 2023-05-10 DIAGNOSIS — E78.5 HYPERLIPIDEMIA WITH TARGET LDL LESS THAN 100: ICD-10-CM

## 2023-05-10 PROCEDURE — 99214 OFFICE O/P EST MOD 30 MIN: CPT | Mod: 25 | Performed by: FAMILY MEDICINE

## 2023-05-10 PROCEDURE — G0438 PPPS, INITIAL VISIT: HCPCS | Performed by: FAMILY MEDICINE

## 2023-05-10 RX ORDER — EZETIMIBE 10 MG/1
1 TABLET ORAL
COMMUNITY
Start: 2023-04-20

## 2023-05-10 ASSESSMENT — PAIN SCALES - GENERAL: PAINLEVEL: NO PAIN (0)

## 2023-05-10 NOTE — PROGRESS NOTES
"SUBJECTIVE:   Yimi is a 67 year old who presents for Preventive Visit.      5/10/2023     2:09 PM   Additional Questions   Roomed by Daniel Courtney   Accompanied by N/A   Patient has been advised of split billing requirements and indicates understanding: Yes  Are you in the first 12 months of your Medicare coverage?  No    History of Present Illness       Reason for visit:  Annual Wellness    He eats 4 or more servings of fruits and vegetables daily.He consumes 0 sweetened beverage(s) daily.He exercises with enough effort to increase his heart rate 60 or more minutes per day.  He exercises with enough effort to increase his heart rate 5 days per week.   He is taking medications regularly.      Have you ever done Advance Care Planning? (For example, a Health Directive, POLST, or a discussion with a medical provider or your loved ones about your wishes): Yes, advance care planning is on file.       Fall risk  Fallen 2 or more times in the past year?: No  Any fall with injury in the past year?: No  click delete button to remove this line now  Cognitive Screening   1) Repeat 3 items (Leader, Season, Table)    2) Clock draw: ABNORMAL \"1150\"  3) 3 item recall: Recalls 2 objects   Results: ABNORMAL clock, 1-2 items recalled: PROBABLE COGNITIVE IMPAIRMENT, **INFORM PROVIDER**    Mini-CogTM Copyright MURRAY Fernandez. Licensed by the author for use in [facility]; reprinted with permission (brandie@.[facility]). All rights reserved.      Do you have sleep apnea, excessive snoring or daytime drowsiness?: no    Reviewed and updated as needed this visit by clinical staff   Tobacco  Allergies  Meds              Reviewed and updated as needed this visit by Provider                 Social History     Tobacco Use     Smoking status: Former     Packs/day: 1.50     Years: 20.00     Pack years: 30.00     Types: Cigarettes     Quit date: 2006     Years since quittin.3     Smokeless tobacco: Never   Vaping Use     Vaping " status: Never Used   Substance Use Topics     Alcohol use: Yes     Comment: Occ.            4/25/2022     7:33 PM   Alcohol Use   Prescreen: >3 drinks/day or >7 drinks/week? No          View : No data to display.              Do you have a current opioid prescription? No  Do you use any other controlled substances or medications that are not prescribed by a provider? None    Joint Pain    Onset: several months     Description:   Location: left wrist and right wrist  Character: Sharp and Dull ache    Intensity: mild, moderate    Progression of Symptoms: worse, intermittent    Accompanying Signs & Symptoms:  Other symptoms: weakness of  L>R    History:   Previous similar pain: YES      Precipitating factors:   Trauma or overuse: YES    Alleviating factors:  Improved by: rest/inactivity  Therapies Tried and outcome: injections in past, temporary    Diabetes Follow-up    How often are you checking your blood sugar? Two times daily  Blood sugar testing frequency justification:  Adjustment of medication(s) and Patient modifying lifestyle changes (diet, exercise) with blood sugars  What time of day are you checking your blood sugars (select all that apply)?  Before and after meals  Have you had any blood sugars above 200?  No  Have you had any blood sugars below 70?  No    What symptoms do you notice when your blood sugar is low?  Shaky, Dizzy and Weak    What concerns do you have today about your diabetes? None     Do you have any of these symptoms? (Select all that apply)  No numbness or tingling in feet.  No redness, sores or blisters on feet.  No complaints of excessive thirst.  No reports of blurry vision.  No significant changes to weight.    Have you had a diabetic eye exam in the last 12 months? yes        Hyperlipidemia Follow-Up      Are you regularly taking any medication or supplement to lower your cholesterol?   Yes- atorvas    Are you having muscle aches or other side effects that you think could be  caused by your cholesterol lowering medication?  No    Hypertension Follow-up      Do you check your blood pressure regularly outside of the clinic? Yes     Are you following a low salt diet? Yes    Are your blood pressures ever more than 140 on the top number (systolic) OR more   than 90 on the bottom number (diastolic), for example 140/90? No    BP Readings from Last 2 Encounters:   05/10/23 117/63   05/02/22 122/78     Hemoglobin A1C (%)   Date Value   04/14/2023 7.1 (H)   11/10/2022 6.9 (H)   05/21/2021 6.8 (H)   08/05/2020 6.4 (H)     LDL Cholesterol Calculated (mg/dL)   Date Value   04/14/2023 86   04/01/2022 69   05/21/2021 81   02/19/2020 72     LDL Cholesterol Direct (mg/dL)   Date Value   08/05/2020 66       Vascular Disease Follow-up      How often do you take nitroglycerin? Never    Do you take an aspirin every day? Yes      Current providers sharing in care for this patient include:   Patient Care Team:  Nabeel Lowe MD as PCP - General (Family Practice)  Anand Jenkins RPH as Pharmacist (Pharmacist)  Nabeel Lowe MD as Assigned PCP  Ruben Cutler MD as Resident (Internal Medicine)  Jessica Durham RPH as Pharmacist (Pharmacist)  Jessica Durham RPH as Assigned [facility] Pharmacist    The following health maintenance items are reviewed in Epic and correct as of today:  Health Maintenance   Topic Date Due     DIABETIC FOOT EXAM  Never done     LUNG CANCER SCREENING  Never done     EYE EXAM  06/01/2020     AORTIC ANEURYSM SCREENING (SYSTEM ASSIGNED)  Never done     ANNUAL REVIEW OF HM ORDERS  03/24/2023     MEDICARE ANNUAL WELLNESS VISIT  05/02/2023     A1C  10/14/2023     DTAP/TDAP/TD IMMUNIZATION (2 - Td or Tdap) 04/10/2024     BMP  04/14/2024     LIPID  04/14/2024     MICROALBUMIN  04/14/2024     FALL RISK ASSESSMENT  05/10/2024     ADVANCE CARE PLANNING  08/22/2027     COLORECTAL CANCER SCREENING  11/22/2032     HEPATITIS C SCREENING  Completed     PHQ-2 (once  "per calendar year)  Completed     INFLUENZA VACCINE  Completed     Pneumococcal Vaccine: 65+ Years  Completed     URINALYSIS  Completed     ZOSTER IMMUNIZATION  Completed     COVID-19 Vaccine  Completed     IPV IMMUNIZATION  Aged Out     MENINGITIS IMMUNIZATION  Aged Out     Lab work is in process  Labs reviewed in EPIC    Review of Systems  Constitutional, HEENT, cardiovascular, pulmonary, gi and gu systems are negative, except as otherwise noted.    OBJECTIVE:   /63 (BP Location: Left arm, Patient Position: Sitting, Cuff Size: Adult Large)   Pulse 60   Temp 97.6  F (36.4  C) (Temporal)   Resp 17   Ht 1.813 m (5' 11.38\")   SpO2 98%   BMI 32.71 kg/m   Estimated body mass index is 32.71 kg/m  as calculated from the following:    Height as of this encounter: 1.813 m (5' 11.38\").    Weight as of 5/2/22: 107.5 kg (237 lb).  Physical Exam  GENERAL: healthy, alert and no distress  EYES: Eyes grossly normal to inspection, PERRL and conjunctivae and sclerae normal  HENT: ear canals and TM's normal, nose and mouth without ulcers or lesions  NECK: no adenopathy, no asymmetry, masses, or scars and thyroid normal to palpation  RESP: lungs clear to auscultation - no rales, rhonchi or wheezes  CV: regular rate and rhythm, normal S1 S2, no S3 or S4, no murmur, click or rub, no peripheral edema and peripheral pulses strong  ABDOMEN: soft, nontender, no hepatosplenomegaly, no masses and bowel sounds normal   (male): normal male genitalia without lesions or urethral discharge, no hernia  RECTAL: normal sphincter tone, no rectal masses, prostate normal size, smooth, nontender without nodules or masses  MS: no gross musculoskeletal defects noted, no edema  SKIN: no suspicious lesions or rashes  NEURO: Normal strength and tone, mentation intact and speech normal  PSYCH: mentation appears normal, affect normal/bright    Diagnostic Test Results:  Labs reviewed in Epic    ASSESSMENT / PLAN:       ICD-10-CM    1. Encounter for " preventative adult health care examination  Z00.00       2. Bilateral carpal tunnel syndrome  G56.03 OFFICE/OUTPT VISIT,EST,LEVL II      3. Type 2 diabetes mellitus without complication, without long-term current use of insulin (H)  E11.9 Adult Eye  Referral     Hemoglobin A1c     Albumin Random Urine Quantitative with Creat Ratio      4. Coronary artery disease involving native coronary artery of native heart without angina pectoris  I25.10 Lipid panel reflex to direct LDL Fasting      5. Hyperlipidemia with target LDL less than 100  E78.5       6. Venous (peripheral) insufficiency  I87.2       7. Erectile dysfunction, unspecified erectile dysfunction type  N52.9       8. HTN, goal below 140/90  I10 Comprehensive metabolic panel (BMP + Alb, Alk Phos, ALT, AST, Total. Bili, TP)      9. Screening for prostate cancer  Z12.5 PSA, screen        COUNSELING:  Reviewed preventive health counseling, as reflected in patient instructions       Regular exercise       Healthy diet/nutrition       Vision screening       Colon cancer screening       Prostate cancer screening        He reports that he quit smoking about 17 years ago. His smoking use included cigarettes. He has a 30.00 pack-year smoking history. He has never used smokeless tobacco.      Appropriate preventive services were discussed with this patient, including applicable screening as appropriate for cardiovascular disease, diabetes, osteopenia/osteoporosis, and glaucoma.  As appropriate for age/gender, discussed screening for colorectal cancer, prostate cancer, breast cancer, and cervical cancer. Checklist reviewing preventive services available has been given to the patient.    Reviewed patients plan of care and provided an AVS. The Basic Care Plan (routine screening as documented in Health Maintenance) for Yimi meets the Care Plan requirement. This Care Plan has been established and reviewed with the Patient.    Patient Instructions   Continue to  wear night wrist splints    Make ergonomic bike adjustments to seat and handlebars/    Nabeel Lowe MD  [facility]    Identified Health Risks:    I have reviewed Opioid Use Disorder and Substance Use Disorder risk factors and made any needed referrals.

## 2023-05-11 PROBLEM — I25.10 CORONARY ARTERY DISEASE INVOLVING NATIVE CORONARY ARTERY OF NATIVE HEART WITHOUT ANGINA PECTORIS: Status: ACTIVE | Noted: 2022-05-02

## 2023-05-11 PROBLEM — B00.9 HSV (HERPES SIMPLEX VIRUS) INFECTION: Status: ACTIVE | Noted: 2023-05-11

## 2023-05-11 RX ORDER — VALACYCLOVIR HYDROCHLORIDE 1 G/1
2000 TABLET, FILM COATED ORAL 2 TIMES DAILY
COMMUNITY
Start: 2023-05-11 | End: 2024-06-20

## 2023-08-29 ENCOUNTER — TRANSFERRED RECORDS (OUTPATIENT)
Dept: HEALTH INFORMATION MANAGEMENT | Facility: CLINIC | Age: 68
End: 2023-08-29
Payer: MEDICARE

## 2023-08-29 LAB — RETINOPATHY: NEGATIVE

## 2023-09-29 ENCOUNTER — OFFICE VISIT (OUTPATIENT)
Dept: FAMILY MEDICINE | Facility: CLINIC | Age: 68
End: 2023-09-29
Payer: MEDICARE

## 2023-09-29 VITALS
HEART RATE: 66 BPM | OXYGEN SATURATION: 98 % | RESPIRATION RATE: 18 BRPM | DIASTOLIC BLOOD PRESSURE: 72 MMHG | HEIGHT: 70 IN | SYSTOLIC BLOOD PRESSURE: 130 MMHG | BODY MASS INDEX: 33.43 KG/M2 | TEMPERATURE: 97.9 F | WEIGHT: 233.5 LBS

## 2023-09-29 DIAGNOSIS — E11.9 TYPE 2 DIABETES MELLITUS WITHOUT COMPLICATION, WITHOUT LONG-TERM CURRENT USE OF INSULIN (H): ICD-10-CM

## 2023-09-29 DIAGNOSIS — Z23 NEED FOR INFLUENZA VACCINATION: ICD-10-CM

## 2023-09-29 DIAGNOSIS — I25.2 OLD MYOCARDIAL INFARCTION: ICD-10-CM

## 2023-09-29 DIAGNOSIS — H90.3 BILATERAL SENSORINEURAL HEARING LOSS: Primary | ICD-10-CM

## 2023-09-29 LAB
ALBUMIN SERPL BCG-MCNC: 4.6 G/DL (ref 3.5–5.2)
ALP SERPL-CCNC: 56 U/L (ref 40–129)
ALT SERPL W P-5'-P-CCNC: 28 U/L (ref 0–70)
ANION GAP SERPL CALCULATED.3IONS-SCNC: 11 MMOL/L (ref 7–15)
AST SERPL W P-5'-P-CCNC: 31 U/L (ref 0–45)
BILIRUB SERPL-MCNC: 1.3 MG/DL
BUN SERPL-MCNC: 18.6 MG/DL (ref 8–23)
CALCIUM SERPL-MCNC: 9.7 MG/DL (ref 8.8–10.2)
CHLORIDE SERPL-SCNC: 106 MMOL/L (ref 98–107)
CHOLEST SERPL-MCNC: 114 MG/DL
CREAT SERPL-MCNC: 0.73 MG/DL (ref 0.67–1.17)
CREAT UR-MCNC: 59.1 MG/DL
DEPRECATED HCO3 PLAS-SCNC: 25 MMOL/L (ref 22–29)
EGFRCR SERPLBLD CKD-EPI 2021: >90 ML/MIN/1.73M2
GLUCOSE SERPL-MCNC: 132 MG/DL (ref 70–99)
HBA1C MFR BLD: 7 % (ref 0–5.6)
HDLC SERPL-MCNC: 44 MG/DL
LDLC SERPL CALC-MCNC: 52 MG/DL
MICROALBUMIN UR-MCNC: 19.8 MG/L
MICROALBUMIN/CREAT UR: 33.5 MG/G CR (ref 0–17)
NONHDLC SERPL-MCNC: 70 MG/DL
POTASSIUM SERPL-SCNC: 4.2 MMOL/L (ref 3.4–5.3)
PROT SERPL-MCNC: 7.2 G/DL (ref 6.4–8.3)
SODIUM SERPL-SCNC: 142 MMOL/L (ref 135–145)
TRIGL SERPL-MCNC: 89 MG/DL

## 2023-09-29 PROCEDURE — 99214 OFFICE O/P EST MOD 30 MIN: CPT | Mod: 25 | Performed by: FAMILY MEDICINE

## 2023-09-29 PROCEDURE — 80061 LIPID PANEL: CPT | Performed by: FAMILY MEDICINE

## 2023-09-29 PROCEDURE — 90662 IIV NO PRSV INCREASED AG IM: CPT | Performed by: FAMILY MEDICINE

## 2023-09-29 PROCEDURE — 36415 COLL VENOUS BLD VENIPUNCTURE: CPT | Performed by: FAMILY MEDICINE

## 2023-09-29 PROCEDURE — 82043 UR ALBUMIN QUANTITATIVE: CPT | Performed by: FAMILY MEDICINE

## 2023-09-29 PROCEDURE — G0008 ADMIN INFLUENZA VIRUS VAC: HCPCS | Performed by: FAMILY MEDICINE

## 2023-09-29 PROCEDURE — 80053 COMPREHEN METABOLIC PANEL: CPT | Performed by: FAMILY MEDICINE

## 2023-09-29 PROCEDURE — 82570 ASSAY OF URINE CREATININE: CPT | Performed by: FAMILY MEDICINE

## 2023-09-29 PROCEDURE — 83036 HEMOGLOBIN GLYCOSYLATED A1C: CPT | Performed by: FAMILY MEDICINE

## 2023-09-29 ASSESSMENT — PAIN SCALES - GENERAL: PAINLEVEL: NO PAIN (0)

## 2023-09-29 ASSESSMENT — ENCOUNTER SYMPTOMS: NUMBNESS: 1

## 2023-09-29 NOTE — PROGRESS NOTES
"Assessment & Plan     Bilateral sensorineural hearing loss  Mild-moderate   - Adult Audiology  Referral; Future    Type 2 diabetes mellitus without complication, without long-term current use of insulin (H)  At goal   - Comprehensive metabolic panel (BMP + Alb, Alk Phos, ALT, AST, Total. Bili, TP); Future  - Hemoglobin A1c; Future  - Albumin Random Urine Quantitative with Creat Ratio; Future  - Comprehensive metabolic panel (BMP + Alb, Alk Phos, ALT, AST, Total. Bili, TP)  - Hemoglobin A1c  - Albumin Random Urine Quantitative with Creat Ratio    Old myocardial infarction  No angina  - Lipid panel reflex to direct LDL Fasting; Future  - Lipid panel reflex to direct LDL Fasting    Need for influenza vaccination  due  - INFLUENZA VACCINE 65+ (FLUZONE HD)    Review of external notes as documented elsewhere in note  Ordering of each unique test  Prescription drug management  25 minutes spent by me on the date of the encounter doing chart review, history and exam, documentation and further activities per the note     BMI:   Estimated body mass index is 33.06 kg/m  as calculated from the following:    Height as of this encounter: 1.79 m (5' 10.47\").    Weight as of this encounter: 105.9 kg (233 lb 8 oz).   Weight management plan: Discussed healthy diet and exercise guidelines    Patient Instructions   Continue current medications, diet and exercise     Nabeel Lowe MD  [facility]    Rodger Geller is a 68 year old, presenting for the following health issues:  No chief complaint on file.        9/29/2023     1:46 PM   Additional Questions   Roomed by Leidy SINCLAIR       History of Present Illness       Diabetes:   He presents for follow up of diabetes.  He is checking home blood glucose a few times a month.   He checks blood glucose before meals.  Blood glucose is never over 200 and never under 70. He is aware of hypoglycemia symptoms including none and other.   He is concerned about " other.   He is having numbness in feet.            Hyperlipidemia:  He presents for follow up of hyperlipidemia.   He is taking medication to lower cholesterol. He is having myalgia or other side effects to statin medications.    Vascular Disease:  He presents for follow up of vascular disease.      He takes daily aspirin.    He eats 4 or more servings of fruits and vegetables daily.He consumes 0 sweetened beverage(s) daily.He exercises with enough effort to increase his heart rate 60 or more minutes per day.  He exercises with enough effort to increase his heart rate 4 days per week.   He is taking medications regularly.       Decreased hearing-no previous audiogram    Diabetes Follow-up    How often are you checking your blood sugar? Two times daily  Blood sugar testing frequency justification:  Adjustment of medication(s) and Patient modifying lifestyle changes (diet, exercise) with blood sugars  What time of day are you checking your blood sugars (select all that apply)?  Before and after meals  Have you had any blood sugars above 200?  No  Have you had any blood sugars below 70?  No  What symptoms do you notice when your blood sugar is low?  None  What concerns do you have today about your diabetes? None   Do you have any of these symptoms? (Select all that apply)  No numbness or tingling in feet.  No redness, sores or blisters on feet.  No complaints of excessive thirst.  No reports of blurry vision.  No significant changes to weight.      BP Readings from Last 2 Encounters:   09/29/23 130/72   05/10/23 117/63     Hemoglobin A1C (%)   Date Value   09/29/2023 7.0 (H)   04/14/2023 7.1 (H)   05/21/2021 6.8 (H)   08/05/2020 6.4 (H)     LDL Cholesterol Calculated (mg/dL)   Date Value   09/29/2023 52   04/14/2023 86   05/21/2021 81   02/19/2020 72     LDL Cholesterol Direct (mg/dL)   Date Value   08/05/2020 66     Vascular Disease Follow-up    How often do you take nitroglycerin? Never  Do you take an aspirin every  "day? Yes  How many days per week do you miss taking your medication? 0      Review of Systems   Neurological:  Positive for numbness.      Constitutional, HEENT, cardiovascular, pulmonary, gi and gu systems are negative, except as otherwise noted.      Objective    /72 (BP Location: Left arm, Patient Position: Sitting, Cuff Size: Adult Regular)   Pulse 66   Temp 97.9  F (36.6  C) (Temporal)   Resp 18   Ht 1.79 m (5' 10.47\")   Wt 105.9 kg (233 lb 8 oz)   SpO2 98%   BMI 33.06 kg/m    Body mass index is 33.06 kg/m .  Physical Exam   GENERAL: healthy, alert and no distress  RESP: lungs clear to auscultation - no rales, rhonchi or wheezes  CV: regular rate and rhythm, normal S1 S2, no S3 or S4, no murmur, click or rub, no peripheral edema and peripheral pulses strong  MS: no gross musculoskeletal defects noted, no edema              "

## 2023-10-05 DIAGNOSIS — E78.5 HYPERLIPIDEMIA WITH TARGET LDL LESS THAN 100: ICD-10-CM

## 2023-10-05 DIAGNOSIS — I10 HYPERTENSION, BENIGN ESSENTIAL, GOAL BELOW 140/90: ICD-10-CM

## 2023-10-05 DIAGNOSIS — I25.2 OLD MYOCARDIAL INFARCTION: ICD-10-CM

## 2023-10-05 RX ORDER — ROSUVASTATIN CALCIUM 20 MG/1
TABLET, COATED ORAL
Qty: 90 TABLET | Refills: 3 | Status: SHIPPED | OUTPATIENT
Start: 2023-10-05 | End: 2024-09-23

## 2023-10-05 RX ORDER — METOPROLOL SUCCINATE 50 MG/1
TABLET, EXTENDED RELEASE ORAL
Qty: 90 TABLET | Refills: 3 | Status: SHIPPED | OUTPATIENT
Start: 2023-10-05 | End: 2024-09-23

## 2023-10-05 NOTE — TELEPHONE ENCOUNTER
"Requested Prescriptions   Pending Prescriptions Disp Refills    rosuvastatin (CRESTOR) 20 MG tablet [Pharmacy Med Name: ROSUVASTATIN CALCIUM 20 MG TAB] 90 tablet 3     Sig: TAKE 1 TABLET BY MOUTH EVERY DAY       Statins Protocol Passed - 10/5/2023  1:31 PM        Passed - LDL on file in past 12 months     Recent Labs   Lab Test 09/29/23  1535   LDL 52             Passed - No abnormal creatine kinase in past 12 months     Recent Labs   Lab Test 12/07/18  1009                   Passed - Recent (12 mo) or future (30 days) visit within the authorizing provider's specialty     Patient has had an office visit with the authorizing provider or a provider within the authorizing providers department within the previous 12 mos or has a future within next 30 days. See \"Patient Info\" tab in inbasket, or \"Choose Columns\" in Meds & Orders section of the refill encounter.              Passed - Medication is active on med list        Passed - Patient is age 18 or older          metoprolol succinate ER (TOPROL XL) 50 MG 24 hr tablet [Pharmacy Med Name: METOPROLOL SUCC ER 50 MG TAB] 90 tablet 3     Sig: TAKE 1 TABLET BY MOUTH EVERY DAY       Beta-Blockers Protocol Passed - 10/5/2023  1:31 PM        Passed - Blood pressure under 140/90 in past 12 months     BP Readings from Last 3 Encounters:   09/29/23 130/72   05/10/23 117/63   05/02/22 122/78                 Passed - Patient is age 6 or older        Passed - Recent (12 mo) or future (30 days) visit within the authorizing provider's specialty     Patient has had an office visit with the authorizing provider or a provider within the authorizing providers department within the previous 12 mos or has a future within next 30 days. See \"Patient Info\" tab in inbasket, or \"Choose Columns\" in Meds & Orders section of the refill encounter.              Passed - Medication is active on med list           Prescription approved per [facility] Refill Protocol.  Song Peguero " DOTTIE  [facility]

## 2023-10-09 NOTE — RESULT ENCOUNTER NOTE
Elvin Geller: Your recent results look good except slight increased loss of albumin in urine.  Recommend scheduling a repeat urine albumin in 2 months to check on the help of kidneys.  Along with your A1c being at goal of 7 or less, keeping your blood pressure less than 140/90 should help.  Contact if questions, Nice to see you!    Nabeel SANTANA

## 2023-10-10 DIAGNOSIS — E11.9 TYPE 2 DIABETES MELLITUS WITHOUT COMPLICATION, WITHOUT LONG-TERM CURRENT USE OF INSULIN (H): ICD-10-CM

## 2023-10-10 RX ORDER — METFORMIN HCL 500 MG
TABLET, EXTENDED RELEASE 24 HR ORAL
Qty: 270 TABLET | Refills: 3 | Status: SHIPPED | OUTPATIENT
Start: 2023-10-10 | End: 2024-09-23

## 2023-10-10 NOTE — TELEPHONE ENCOUNTER
Pending Prescriptions:                       Disp   Refills    metFORMIN (GLUCOPHAGE XR) 500 MG 24 hr ta*270 ta*3            Sig: TAKE 1 TABLET BY MOUTH THREE TIMES A DAY WITH           MEALS

## 2023-10-10 NOTE — TELEPHONE ENCOUNTER
"Requested Prescriptions   Pending Prescriptions Disp Refills    metFORMIN (GLUCOPHAGE XR) 500 MG 24 hr tablet 270 tablet 3     Sig: TAKE 1 TABLET BY MOUTH THREE TIMES A DAY WITH MEALS       Biguanide Agents Passed - 10/10/2023  1:02 PM        Passed - Patient is age 10 or older        Passed - Patient has documented A1c within the specified period of time.     If HgbA1C is 8 or greater, it needs to be on file within the past 3 months.  If less than 8, must be on file within the past 6 months.     Recent Labs   Lab Test 09/29/23  1535   A1C 7.0*             Passed - Patient's CR is NOT>1.4 OR Patient's EGFR is NOT<45 within past 12 mos.     Recent Labs   Lab Test 09/29/23  1535 04/01/22  0947 05/21/21  1435   GFRESTIMATED >90   < > >90   GFRESTBLACK  --   --  >90    < > = values in this interval not displayed.       Recent Labs   Lab Test 09/29/23  1535   CR 0.73             Passed - Patient does NOT have a diagnosis of CHF.        Passed - Medication is active on med list        Passed - Recent (6 mo) or future (30 days) visit within the authorizing provider's specialty     Patient had office visit in the last 6 months or has a visit in the next 30 days with authorizing provider or within the authorizing provider's specialty.  See \"Patient Info\" tab in inbasket, or \"Choose Columns\" in Meds & Orders section of the refill encounter.              Prescription approved per [facility] Refill Protocol.     Pt was last seen on 05/10/23    Lavinia Dominguez RN  [facility]   "

## 2023-11-06 ENCOUNTER — E-VISIT (OUTPATIENT)
Dept: FAMILY MEDICINE | Facility: CLINIC | Age: 68
End: 2023-11-06
Payer: MEDICARE

## 2023-11-06 DIAGNOSIS — R30.0 DYSURIA: Primary | ICD-10-CM

## 2023-11-06 PROCEDURE — 99421 OL DIG E/M SVC 5-10 MIN: CPT | Performed by: FAMILY MEDICINE

## 2023-11-07 ENCOUNTER — LAB (OUTPATIENT)
Dept: LAB | Facility: CLINIC | Age: 68
End: 2023-11-07
Payer: MEDICARE

## 2023-11-07 DIAGNOSIS — R30.0 DYSURIA: ICD-10-CM

## 2023-11-07 LAB
ALBUMIN UR-MCNC: NEGATIVE MG/DL
APPEARANCE UR: CLEAR
BACTERIA #/AREA URNS HPF: ABNORMAL /HPF
BILIRUB UR QL STRIP: NEGATIVE
COLOR UR AUTO: YELLOW
GLUCOSE UR STRIP-MCNC: NEGATIVE MG/DL
HGB UR QL STRIP: ABNORMAL
KETONES UR STRIP-MCNC: NEGATIVE MG/DL
LEUKOCYTE ESTERASE UR QL STRIP: NEGATIVE
NITRATE UR QL: NEGATIVE
PH UR STRIP: 5.5 [PH] (ref 5–7)
RBC #/AREA URNS AUTO: ABNORMAL /HPF
SP GR UR STRIP: 1.02 (ref 1–1.03)
SQUAMOUS #/AREA URNS AUTO: ABNORMAL /LPF
UROBILINOGEN UR STRIP-ACNC: 0.2 E.U./DL
WBC #/AREA URNS AUTO: ABNORMAL /HPF

## 2023-11-07 PROCEDURE — 81001 URINALYSIS AUTO W/SCOPE: CPT

## 2023-11-22 ENCOUNTER — LAB (OUTPATIENT)
Dept: LAB | Facility: CLINIC | Age: 68
End: 2023-11-22
Payer: MEDICARE

## 2023-11-22 DIAGNOSIS — E11.9 TYPE 2 DIABETES MELLITUS WITHOUT COMPLICATION, WITHOUT LONG-TERM CURRENT USE OF INSULIN (H): ICD-10-CM

## 2023-11-22 DIAGNOSIS — Z12.5 SCREENING FOR PROSTATE CANCER: ICD-10-CM

## 2023-11-22 DIAGNOSIS — I25.10 CORONARY ARTERY DISEASE INVOLVING NATIVE CORONARY ARTERY OF NATIVE HEART WITHOUT ANGINA PECTORIS: ICD-10-CM

## 2023-11-22 DIAGNOSIS — I10 HTN, GOAL BELOW 140/90: ICD-10-CM

## 2023-11-22 LAB
ALBUMIN SERPL BCG-MCNC: 4.5 G/DL (ref 3.5–5.2)
ALP SERPL-CCNC: 51 U/L (ref 40–150)
ALT SERPL W P-5'-P-CCNC: 30 U/L (ref 0–70)
ANION GAP SERPL CALCULATED.3IONS-SCNC: 10 MMOL/L (ref 7–15)
AST SERPL W P-5'-P-CCNC: 30 U/L (ref 0–45)
BILIRUB SERPL-MCNC: 1.4 MG/DL
BUN SERPL-MCNC: 15.6 MG/DL (ref 8–23)
CALCIUM SERPL-MCNC: 9.6 MG/DL (ref 8.8–10.2)
CHLORIDE SERPL-SCNC: 105 MMOL/L (ref 98–107)
CHOLEST SERPL-MCNC: 110 MG/DL
CREAT SERPL-MCNC: 0.72 MG/DL (ref 0.67–1.17)
CREAT UR-MCNC: 29.6 MG/DL
DEPRECATED HCO3 PLAS-SCNC: 26 MMOL/L (ref 22–29)
EGFRCR SERPLBLD CKD-EPI 2021: >90 ML/MIN/1.73M2
GLUCOSE SERPL-MCNC: 150 MG/DL (ref 70–99)
HBA1C MFR BLD: 6.6 % (ref 0–5.6)
HDLC SERPL-MCNC: 43 MG/DL
LDLC SERPL CALC-MCNC: 55 MG/DL
MICROALBUMIN UR-MCNC: <12 MG/L
MICROALBUMIN/CREAT UR: NORMAL MG/G{CREAT}
NONHDLC SERPL-MCNC: 67 MG/DL
POTASSIUM SERPL-SCNC: 4.3 MMOL/L (ref 3.4–5.3)
PROT SERPL-MCNC: 6.9 G/DL (ref 6.4–8.3)
PSA SERPL DL<=0.01 NG/ML-MCNC: 0.65 NG/ML (ref 0–4.5)
SODIUM SERPL-SCNC: 141 MMOL/L (ref 135–145)
TRIGL SERPL-MCNC: 58 MG/DL

## 2023-11-22 PROCEDURE — 80061 LIPID PANEL: CPT

## 2023-11-22 PROCEDURE — G0103 PSA SCREENING: HCPCS

## 2023-11-22 PROCEDURE — 36415 COLL VENOUS BLD VENIPUNCTURE: CPT

## 2023-11-22 PROCEDURE — 80053 COMPREHEN METABOLIC PANEL: CPT

## 2023-11-22 PROCEDURE — 82043 UR ALBUMIN QUANTITATIVE: CPT

## 2023-11-22 PROCEDURE — 82570 ASSAY OF URINE CREATININE: CPT

## 2023-11-22 PROCEDURE — 83036 HEMOGLOBIN GLYCOSYLATED A1C: CPT

## 2023-11-29 ENCOUNTER — OFFICE VISIT (OUTPATIENT)
Dept: FAMILY MEDICINE | Facility: CLINIC | Age: 68
End: 2023-11-29
Payer: MEDICARE

## 2023-11-29 VITALS
WEIGHT: 230 LBS | HEIGHT: 72 IN | BODY MASS INDEX: 31.15 KG/M2 | OXYGEN SATURATION: 97 % | SYSTOLIC BLOOD PRESSURE: 128 MMHG | DIASTOLIC BLOOD PRESSURE: 64 MMHG | TEMPERATURE: 97.7 F | HEART RATE: 70 BPM | RESPIRATION RATE: 17 BRPM

## 2023-11-29 DIAGNOSIS — E80.4 GILBERT'S SYNDROME: ICD-10-CM

## 2023-11-29 DIAGNOSIS — E11.9 TYPE 2 DIABETES MELLITUS WITHOUT COMPLICATION, WITHOUT LONG-TERM CURRENT USE OF INSULIN (H): ICD-10-CM

## 2023-11-29 DIAGNOSIS — F43.9 STRESS: Primary | ICD-10-CM

## 2023-11-29 DIAGNOSIS — R80.9 MICROALBUMINURIA: ICD-10-CM

## 2023-11-29 DIAGNOSIS — E78.5 HYPERLIPIDEMIA WITH TARGET LDL LESS THAN 100: ICD-10-CM

## 2023-11-29 DIAGNOSIS — I25.2 OLD MYOCARDIAL INFARCTION: ICD-10-CM

## 2023-11-29 PROCEDURE — 99214 OFFICE O/P EST MOD 30 MIN: CPT | Performed by: FAMILY MEDICINE

## 2023-11-29 ASSESSMENT — PAIN SCALES - GENERAL: PAINLEVEL: NO PAIN (0)
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
## 2023-11-30 PROBLEM — F43.9 STRESS: Status: ACTIVE | Noted: 2023-11-30

## 2023-11-30 PROBLEM — R80.9 MICROALBUMINURIA: Status: ACTIVE | Noted: 2023-11-30

## 2023-11-30 PROBLEM — E80.4 GILBERT'S SYNDROME: Status: ACTIVE | Noted: 2023-11-30

## 2023-11-30 NOTE — PATIENT INSTRUCTIONS
Continue current medications, diet and exercise    Followup with cards    Rx paper for semaglutide given to patient to consider whether to fill. Call if questions

## 2023-12-30 DIAGNOSIS — I10 HYPERTENSION, BENIGN ESSENTIAL, GOAL BELOW 140/90: ICD-10-CM

## 2024-01-02 RX ORDER — LISINOPRIL 10 MG/1
TABLET ORAL
Qty: 90 TABLET | Refills: 3 | Status: SHIPPED | OUTPATIENT
Start: 2024-01-02

## 2024-03-27 ENCOUNTER — LAB (OUTPATIENT)
Dept: LAB | Facility: CLINIC | Age: 69
End: 2024-03-27
Payer: MEDICARE

## 2024-03-27 DIAGNOSIS — E11.9 TYPE 2 DIABETES MELLITUS WITHOUT COMPLICATION, WITHOUT LONG-TERM CURRENT USE OF INSULIN (H): ICD-10-CM

## 2024-03-27 LAB
CREAT UR-MCNC: 84.7 MG/DL
MICROALBUMIN UR-MCNC: <12 MG/L
MICROALBUMIN/CREAT UR: NORMAL MG/G{CREAT}

## 2024-03-27 PROCEDURE — 82570 ASSAY OF URINE CREATININE: CPT

## 2024-03-27 PROCEDURE — 82043 UR ALBUMIN QUANTITATIVE: CPT

## 2024-03-29 ENCOUNTER — OFFICE VISIT (OUTPATIENT)
Dept: INTERNAL MEDICINE | Facility: CLINIC | Age: 69
End: 2024-03-29
Payer: MEDICARE

## 2024-03-29 VITALS
SYSTOLIC BLOOD PRESSURE: 118 MMHG | OXYGEN SATURATION: 98 % | HEIGHT: 71 IN | BODY MASS INDEX: 31.81 KG/M2 | HEART RATE: 56 BPM | DIASTOLIC BLOOD PRESSURE: 71 MMHG | WEIGHT: 227.2 LBS

## 2024-03-29 DIAGNOSIS — E11.9 TYPE 2 DIABETES MELLITUS WITHOUT COMPLICATION, WITHOUT LONG-TERM CURRENT USE OF INSULIN (H): Primary | ICD-10-CM

## 2024-03-29 DIAGNOSIS — Z29.11 NEED FOR VACCINATION AGAINST RESPIRATORY SYNCYTIAL VIRUS: ICD-10-CM

## 2024-03-29 DIAGNOSIS — Z23 NEED FOR TDAP VACCINATION: ICD-10-CM

## 2024-03-29 PROCEDURE — 99204 OFFICE O/P NEW MOD 45 MIN: CPT | Performed by: INTERNAL MEDICINE

## 2024-03-29 RX ORDER — RESPIRATORY SYNCYTIAL VIRUS VACCINE 120MCG/0.5
0.5 KIT INTRAMUSCULAR ONCE
Qty: 1 EACH | Refills: 0 | Status: CANCELLED | OUTPATIENT
Start: 2024-03-29 | End: 2024-03-29

## 2024-03-29 NOTE — PROGRESS NOTES
"  Assessment & Plan     Need for Tdap vaccination  Reviewed vaccinations with patient.  Recommend Tdap vaccine.    Need for vaccination against respiratory syncytial virus  Patient is up-to-date on RSV vaccine.    Type 2 diabetes mellitus without complication, without long-term current use of insulin (H)  Reviewed management of diabetes with patient.  Given the patient has history of coronary artery disease, recommend to continue with Ozempic given beneficial effects of the medication on cardiovascular risk profile.  Referral to Pharm.D. was also placed to for discussion of medication alternatives that may be less financially burdensome for the patient.  Recommend checking hemoglobin A1c as well as chemistry panel today.  - Med Therapy Management Referral  - Hemoglobin A1c; Future  - Basic metabolic panel  (Ca, Cl, CO2, Creat, Gluc, K, Na, BUN); Future      I spent a total of 45 minutes on the day of the visit.   Time spent by me doing chart review, history and exam, documentation and further activities per the note      BMI  Estimated body mass index is 31.25 kg/m  as calculated from the following:    Height as of this encounter: 1.816 m (5' 11.5\").    Weight as of this encounter: 103.1 kg (227 lb 3.2 oz).         Return in about 3 months (around 6/29/2024).    Rodger Geller is a 68 year old, presenting for the following health issues:  Establish Care (Pt here to establish care and would like to discuss type 2 diabetes, BMI, prostate problems, and CAD(coronary artery disease))      3/29/2024     7:52 AM   Additional Questions   Roomed by Fela Garnett   Accompanied by Agustin-wife     History of Present Illness       Reason for visit:  GP is retiring. Need to establish relationship with new Dr.  CAD, Type II Diabetes, BMI. Dr Lowe recommended you.    He eats 4 or more servings of fruits and vegetables daily.He consumes 0 sweetened beverage(s) daily.He exercises with enough effort to increase his heart rate 60 " "or more minutes per day.  He exercises with enough effort to increase his heart rate 4 days per week.   He is taking medications regularly.     Patient reports that his primary care provider is retiring. He is looking for a different provider. He reports a history of heart attack that prompted a lot of life changes. He is trying to bring A1C down. He is dealing with diabetic neuropathy. He has started Ozempic recently, has been able to tolerate it well. He is currently on 0.25 mg once a week and tolerating it well.   Patient also reports that he is planning bike ride in the summer of this year and wondering if he should adjust his medications prior to the ride.  He reports occasional leg cramping with longer rides.  He has been trying to stay hydrated for the rides and using sugar-free electrolyte tablets along with water.        Objective    /71 (BP Location: Right arm, Patient Position: Sitting, Cuff Size: Adult Regular)   Pulse 56   Ht 1.816 m (5' 11.5\")   Wt 103.1 kg (227 lb 3.2 oz)   SpO2 98%   BMI 31.25 kg/m    Body mass index is 31.25 kg/m .  Physical Exam   GENERAL: alert and no distress  EYES: Eyes grossly normal to inspection, PERRL and conjunctivae and sclerae normal  RESP: normal effort, no wheezing  CV: regular rates and rhythm and no peripheral edema  MS: no gross musculoskeletal defects noted, no edema  SKIN: no suspicious lesions or rashes  PSYCH: mentation appears normal, affect normal/bright            Signed Electronically by: Joycelyn Beck MD    "

## 2024-04-09 NOTE — RESULT ENCOUNTER NOTE
Elvin Geller, your urinary albumen test was normal; recheck in 1 year. Contact if questions   Nabeel SANTANA

## 2024-05-24 ENCOUNTER — LAB (OUTPATIENT)
Dept: LAB | Facility: CLINIC | Age: 69
End: 2024-05-24
Payer: MEDICARE

## 2024-05-24 DIAGNOSIS — E11.9 TYPE 2 DIABETES MELLITUS WITHOUT COMPLICATION, WITHOUT LONG-TERM CURRENT USE OF INSULIN (H): ICD-10-CM

## 2024-05-24 LAB
ANION GAP SERPL CALCULATED.3IONS-SCNC: 9 MMOL/L (ref 7–15)
BUN SERPL-MCNC: 24.5 MG/DL (ref 8–23)
CALCIUM SERPL-MCNC: 9.3 MG/DL (ref 8.8–10.2)
CHLORIDE SERPL-SCNC: 107 MMOL/L (ref 98–107)
CREAT SERPL-MCNC: 0.82 MG/DL (ref 0.67–1.17)
DEPRECATED HCO3 PLAS-SCNC: 24 MMOL/L (ref 22–29)
EGFRCR SERPLBLD CKD-EPI 2021: >90 ML/MIN/1.73M2
GLUCOSE SERPL-MCNC: 139 MG/DL (ref 70–99)
HBA1C MFR BLD: 6.3 %
POTASSIUM SERPL-SCNC: 5.2 MMOL/L (ref 3.4–5.3)
SODIUM SERPL-SCNC: 140 MMOL/L (ref 135–145)

## 2024-05-24 PROCEDURE — 99000 SPECIMEN HANDLING OFFICE-LAB: CPT | Performed by: PATHOLOGY

## 2024-05-24 PROCEDURE — 36415 COLL VENOUS BLD VENIPUNCTURE: CPT | Performed by: PATHOLOGY

## 2024-05-24 PROCEDURE — 83036 HEMOGLOBIN GLYCOSYLATED A1C: CPT | Performed by: INTERNAL MEDICINE

## 2024-05-24 PROCEDURE — 80048 BASIC METABOLIC PNL TOTAL CA: CPT | Performed by: PATHOLOGY

## 2024-05-31 ENCOUNTER — OFFICE VISIT (OUTPATIENT)
Dept: INTERNAL MEDICINE | Facility: CLINIC | Age: 69
End: 2024-05-31
Payer: MEDICARE

## 2024-05-31 VITALS
SYSTOLIC BLOOD PRESSURE: 102 MMHG | OXYGEN SATURATION: 98 % | HEART RATE: 64 BPM | DIASTOLIC BLOOD PRESSURE: 66 MMHG | WEIGHT: 218.1 LBS | BODY MASS INDEX: 30 KG/M2

## 2024-05-31 DIAGNOSIS — Z00.00 MEDICARE ANNUAL WELLNESS VISIT, SUBSEQUENT: Primary | ICD-10-CM

## 2024-05-31 DIAGNOSIS — M65.30 TRIGGER FINGER, ACQUIRED: ICD-10-CM

## 2024-05-31 DIAGNOSIS — Z23 NEED FOR TDAP VACCINATION: ICD-10-CM

## 2024-05-31 PROCEDURE — G0439 PPPS, SUBSEQ VISIT: HCPCS | Performed by: INTERNAL MEDICINE

## 2024-05-31 NOTE — PROGRESS NOTES
"Preventive Care Visit  [facility]  Joycelyn Beck MD, Internal Medicine  May 31, 2024      Assessment & Plan     Medicare annual wellness visit, subsequent  Reviewed preventive healthcare needs with patient.  Advised on the need for tetanus vaccine.  - REVIEW OF HEALTH MAINTENANCE PROTOCOL ORDERS    Need for Tdap vaccination      Trigger finger, acquired  Patient reports prior history of trigger finger surgery.  Recommend evaluation by hand specialist.  Referral was placed today.  - Orthopedic  Referral; Future    Diabetes mellitus.  Recent hemoglobin A1c is within normal range.  Discussed changes in medication management.  Given that patient is able to tolerate Ozempic at current dose without any significant issues other than mild reflux symptoms, recommend to continue with current dose.  Patient was advised on possible discontinuation of metformin.  However, at this time no changes were made.    Essential hypertension.   Blood pressure is within acceptable range.  Recommend to continue with current medications.          BMI  Estimated body mass index is 30 kg/m  as calculated from the following:    Height as of 3/29/24: 1.816 m (5' 11.5\").    Weight as of this encounter: 98.9 kg (218 lb 1.6 oz).             No follow-ups on file.    Subjective   Yimi is a 68 year old, presenting for the following:  Follow Up (Pt reports he started Ozempic a few month ago, reports it has been effective, lost about 15 pounds. Pt reports some constipation.), Diabetes, Recheck Medication (Pt is currently on 0.5 mg dose of Ozempic, wondering if that is the dosage he should continue to be on), and Results (Pt would like to review A1C results)        5/31/2024     8:22 AM   Additional Questions   Roomed by VALDEMAR MANZANARES         Health Care Directive  Patient has a Health Care Directive on file  Advance care planning document is on file and is current.    History of Present Illness "       Diabetes:   He presents for follow up of diabetes.  He is checking home blood glucose a few times a month.   He checks blood glucose before meals and after meals.  Blood glucose is never over 200 and never under 70.  When his blood glucose is low, the patient is asymptomatic for confusion, blurred vision, lethargy and reports not feeling dizzy, shaky, or weak.   He has no concerns regarding his diabetes at this time.  He is having burning in feet.            He eats 4 or more servings of fruits and vegetables daily.He consumes 0 sweetened beverage(s) daily.He exercises with enough effort to increase his heart rate 60 or more minutes per day.  He exercises with enough effort to increase his heart rate 5 days per week.   He is taking medications regularly.    Patient is here for a preventive visit. He reports that he has noticed worsening of reflux with starting Ozempic. It is not happening very often. He has lost some weight. He also has issues with several trigger fingers.         4/25/2022   General Health   How would you rate your overall physical health? Good         4/25/2022   Nutrition   At least 4 servings of fruits and vegetables/day Yes         4/25/2022   Exercise   Frequency of exercise: 4-5 days/week         3/28/2024   Social Factors   Worry food won't last until get money to buy more No   Food not last or not have enough money for food? No   Do you have housing?  Yes   Are you worried about losing your housing? No   Lack of transportation? No   Unable to get utilities (heat,electricity)? No         5/31/2024   Fall Risk   Fallen 2 or more times in the past year? No   Trouble with walking or balance? No          4/25/2022   Activities of Daily Living- Home Safety   Needs help with the following daily activites NO assistance is needed   Safety concerns in the home No grab bars in the bathroom         7/8/2017   Dental   Dentist two times every year? Yes         4/25/2022   Hearing Screening    Hearing concerns? Feel that people are mumbling or not speaking clearly    Difficulty understanding soft or whispered speech            No data to display                       Today's PHQ-2 Score:       3/28/2024     9:20 PM   PHQ-2 (  [employer])   Q1: Little interest or pleasure in doing things 0   Q2: Feeling down, depressed or hopeless 0   PHQ-2 Score 0   Q1: Little interest or pleasure in doing things Not at all   Q2: Feeling down, depressed or hopeless Not at all   PHQ-2 Score 0         2022   Substance Use   Alcohol more than 3/day or more than 7/wk No     Social History     Tobacco Use    Smoking status: Former     Current packs/day: 0.00     Average packs/day: 1.5 packs/day for 20.0 years (30.0 ttl pk-yrs)     Types: Cigarettes     Start date: 1984     Quit date: 2004     Years since quittin.9    Smokeless tobacco: Never    Tobacco comments:     Quit when Dr Syed Almeida (Attending [facility]) told me your cigarettes or leg repair.   Vaping Use    Vaping status: Never Used   Substance Use Topics    Alcohol use: Yes     Comment: Occ.     Drug use: No       Last PSA:   PSA   Date Value Ref Range Status   2021 0.66 0 - 4 ug/L Final     Comment:     Assay Method:  Chemiluminescence using Siemens Vista analyzer     Prostate Specific Antigen Screen   Date Value Ref Range Status   2023 0.65 0.00 - 4.50 ng/mL Final   2022 0.68 0.00 - 4.00 ug/L Final     ASCVD Risk   The ASCVD Risk score (Yaquelin GARCIA, et al., 2019) failed to calculate for the following reasons:    The patient has a prior MI or stroke diagnosis            Reviewed and updated as needed this visit by Provider                    Family History   Problem Relation Age of Onset    Cancer - colorectal Mother     Colon Cancer Mother     Diabetes Mother     Depression Mother     Anxiety Disorder Mother     Mental Illness Mother     Obesity Mother     Heart Disease Father        Past Medical History:  "  Diagnosis Date    BMI 33.0-33.9,adult 04/16/2019    Congestive heart failure (H) 9/2016    Diabetes (H)     Heart disease     Hypertension     Open left ankle fracture ~'05    infected     Past Surgical History:   Procedure Laterality Date    CARDIAC SURGERY  9/2016    COLONOSCOPY  05/2012    repeat 5 yrs    VASCULAR SURGERY  9/2/2016    Heart Attack AngioPlasti Procedure 2 stents installed    ZZC MUSCLE-SKIN FLAP,LEG  \"       \"    ZZC OPEN RX ANKLE DISLOCATN+FIXATN  ~'05    infected     Current providers sharing in care for this patient include:  Patient Care Team:  Joycelyn Beck MD as PCP - General (Internal Medicine)  Anand Jenkins [facility] as Pharmacist (Pharmacist)  Nabeel Lowe MD as Assigned PCP  Ruben Cutler MD as Resident (Internal Medicine)  Jessica Durham RPH as Pharmacist (Pharmacist)  Jessica Durham RPH as Assigned [facility] Pharmacist    The following health maintenance items are reviewed in Epic and correct as of today:  Health Maintenance   Topic Date Due    DIABETIC FOOT EXAM  Never done    DTAP/TDAP/TD IMMUNIZATION (2 - Td or Tdap) 04/10/2024    ANNUAL REVIEW OF HM ORDERS  05/10/2024    MEDICARE ANNUAL WELLNESS VISIT  05/10/2024    COVID-19 Vaccine (7 - 2023-24 season) 07/15/2024    EYE EXAM  08/29/2024    LIPID  11/22/2024    A1C  11/24/2024    MICROALBUMIN  03/27/2025    BMP  05/24/2025    FALL RISK ASSESSMENT  05/31/2025    ADVANCE CARE PLANNING  05/11/2028    COLORECTAL CANCER SCREENING  11/22/2032    HEPATITIS C SCREENING  Completed    PHQ-2 (once per calendar year)  Completed    INFLUENZA VACCINE  Completed    Pneumococcal Vaccine: 65+ Years  Completed    ZOSTER IMMUNIZATION  Completed    RSV VACCINE (Pregnancy & 60+)  Completed    AORTIC ANEURYSM SCREENING (SYSTEM ASSIGNED)  Completed    IPV IMMUNIZATION  Aged Out    HPV IMMUNIZATION  Aged Out    MENINGITIS IMMUNIZATION  Aged Out    RSV MONOCLONAL ANTIBODY  Aged Out            Objective    Exam  BP " "102/66 (BP Location: Right arm, Patient Position: Sitting, Cuff Size: Adult Large)   Pulse 64   Wt 98.9 kg (218 lb 1.6 oz)   SpO2 98%   BMI 30.00 kg/m     Estimated body mass index is 30 kg/m  as calculated from the following:    Height as of 3/29/24: 1.816 m (5' 11.5\").    Weight as of this encounter: 98.9 kg (218 lb 1.6 oz).    Physical Exam  GENERAL: alert and no distress  EYES: Eyes grossly normal to inspection, PERRL and conjunctivae and sclerae normal  NECK: no adenopathy, no asymmetry, masses, or scars  RESP: lungs clear to auscultation - no rales, rhonchi or wheezes  CV: regular rate and rhythm, normal S1 S2, no S3 or S4, no murmur, click or rub, no peripheral edema  ABDOMEN: soft, nontender and bowel sounds normal  MS: no gross musculoskeletal defects noted, no edema  SKIN: no suspicious lesions or rashes  NEURO: Normal strength and tone, mentation intact and speech normal         No data to display                     Signed Electronically by: Joycelyn Beck MD    "

## 2024-05-31 NOTE — PROGRESS NOTES
Medicare Annual Wellness Questionnaire:  This 68 year old year old male presents for a Medicare Wellness Exam.    Fall Risk Assessment:  Have you fallen 2 or more times in the last year?  No    How many times were you injured due to a fall in the last year?  No    PHQ-2:  Over the last 2 weeks, how often have you been bothered by feeling down, depressed, or hopeless?  Not at all (0)   Over the last 2 weeks, how often have you had little interest or pleasure in doing things?  Not at all (0)   Social History:  What is your marital status?      Who lives in your household?  Wife, 2 dogs    Does your home have loose rugs in the hallway:     No    Does your home have grab bars in the bathroom:    Yes     Does your home have handrails on the stairs?  Yes     Does your home have poorly lit areas?    No    Do you feel threatened or controlled by a partner, ex-partner or anyone in your life?   No    Has anyone hurt you physically, for example by pushing, hitting, slapping or kicking you or forcing you to have sex?   No    Do you need help with the phone, transportation, shopping, preparing meals, housework, laundry, medications or managing money?   No    Sexual Health:  Are you sexually active?    Yes     If yes, with men, women, or both?  Wife    If yes, how many partners?  1    If yes, are you using condoms?    No    Have you had any sexually transmitted infections in the last year?   No    Do you have any sexual concerns?    No    General Health Assessment:  Have you noticed any hearing difficulties?   N/A    Do you wear hearing aids?   No    Have you seen a hearing professional such as an audiologist in the last 1 year?   No    Do you have vision difficulty?    No    Do you wear glasses or contacts?   Yes     Have you seen an eye doctor in the last 1 year?   Yes     How many servings of fruits and vegetables do you eat a day?  Fruit: 2  Vegetables: 3    How often do you exercise in a week?  4-5    How long and  what kind of exercise do you do?  2-3 hours cycling    Tobacco and Alcohol History:  Do you use tobacco/nicotine products?    No    If yes, please list the method of use and average weekly consumption?  N/A    Do you use any other drugs?   No         Do you drink alcohol?   Yes     If you drink alcohol, how many drinks per week?  N/A    Advanced Directive:  Have you completed an Advance Directives document?  Yes     If yes, have you given a copy to the clinic?   Yes     Do you need information on Advance Directives?   No    Cynthia Leblanc, EMT at 9:23 AM on 5/31/2024

## 2024-05-31 NOTE — PROGRESS NOTES
{PROVIDER CHARTING PREFERENCE:380175}    Rodger Geller is a 68 year old, presenting for the following health issues:  Follow Up (Pt reports he started Ozempic a few month ago, reports it has been effective, lost about 15 pounds. Pt reports some constipation.), Diabetes, Recheck Medication (Pt is currently on 0.5 mg dose of Ozempic, wondering if that is the dosage he should continue to be on), and Results (Pt would like to review A1C results)      5/31/2024     8:22 AM   Additional Questions   Roomed by VALDEMAR MANZANARES     History of Present Illness       Diabetes:   He presents for follow up of diabetes.  He is checking home blood glucose a few times a month.   He checks blood glucose before meals and after meals.  Blood glucose is never over 200 and never under 70.  When his blood glucose is low, the patient is asymptomatic for confusion, blurred vision, lethargy and reports not feeling dizzy, shaky, or weak.   He has no concerns regarding his diabetes at this time.  He is having burning in feet.            He eats 4 or more servings of fruits and vegetables daily.He consumes 0 sweetened beverage(s) daily.He exercises with enough effort to increase his heart rate 60 or more minutes per day.  He exercises with enough effort to increase his heart rate 5 days per week.   He is taking medications regularly.       {MA/LPN/RN Pre-Provider Visit Orders- hCG/UA/Strep (Optional):800930}  {SUPERLIST (Optional):590939}  {additonal problems for provider to add (Optional):461441}    {ROS Picklists (Optional):519080}      Objective    /66 (BP Location: Right arm, Patient Position: Sitting, Cuff Size: Adult Large)   Pulse 64   Wt 98.9 kg (218 lb 1.6 oz)   SpO2 98%   BMI 30.00 kg/m    Body mass index is 30 kg/m .  Physical Exam   {Exam List (Optional):146069}    {Diagnostic Test Results (Optional):431185}        Signed Electronically by: Joycelyn Beck MD  {Email feedback regarding this note to  [email or web address]   :[id number]}

## 2024-06-20 ENCOUNTER — PRE VISIT (OUTPATIENT)
Dept: ORTHOPEDICS | Facility: CLINIC | Age: 69
End: 2024-06-20

## 2024-06-20 ENCOUNTER — OFFICE VISIT (OUTPATIENT)
Dept: ORTHOPEDICS | Facility: CLINIC | Age: 69
End: 2024-06-20
Attending: INTERNAL MEDICINE
Payer: MEDICARE

## 2024-06-20 DIAGNOSIS — M72.0 DUPUYTREN'S CONTRACTURE OF LEFT HAND: ICD-10-CM

## 2024-06-20 DIAGNOSIS — M65.30 TRIGGER FINGER, ACQUIRED: Primary | ICD-10-CM

## 2024-06-20 PROCEDURE — 99202 OFFICE O/P NEW SF 15 MIN: CPT | Mod: 25 | Performed by: FAMILY MEDICINE

## 2024-06-20 PROCEDURE — 20550 NJX 1 TENDON SHEATH/LIGAMENT: CPT | Mod: F9 | Performed by: FAMILY MEDICINE

## 2024-06-20 RX ORDER — TRIAMCINOLONE ACETONIDE 40 MG/ML
20 INJECTION, SUSPENSION INTRA-ARTICULAR; INTRAMUSCULAR
Status: SHIPPED | OUTPATIENT
Start: 2024-06-20

## 2024-06-20 RX ORDER — LIDOCAINE HYDROCHLORIDE 10 MG/ML
0.5 INJECTION, SOLUTION EPIDURAL; INFILTRATION; INTRACAUDAL; PERINEURAL
Status: SHIPPED | OUTPATIENT
Start: 2024-06-20

## 2024-06-20 RX ADMIN — LIDOCAINE HYDROCHLORIDE 0.5 ML: 10 INJECTION, SOLUTION EPIDURAL; INFILTRATION; INTRACAUDAL; PERINEURAL at 14:46

## 2024-06-20 RX ADMIN — TRIAMCINOLONE ACETONIDE 20 MG: 40 INJECTION, SUSPENSION INTRA-ARTICULAR; INTRAMUSCULAR at 14:46

## 2024-06-20 NOTE — TELEPHONE ENCOUNTER
DIAGNOSIS: Trigger finger, right hand little finger / no images or surg     APPOINTMENT DATE: 6.20.24   NOTES STATUS DETAILS   OFFICE NOTE from referring provider Internal 5.31.24  Ebony BURRELL   OFFICE NOTE from other specialist CE 4.1.22  Terry CR   MEDICATION LIST Internal

## 2024-06-20 NOTE — PROGRESS NOTES
"ASSESSMENT/PLAN:    (M65.30) Trigger finger, acquired  (primary encounter diagnosis)  Comment: cortisone today; f/up in 4-6 wks to consider repeat injection  Plan: lidocaine (PF) (XYLOCAINE) 1 % injection,         triamcinolone (KENALOG-40) 40 MG/ML injection,         Hand / Upper Extremity Injection: R small A1          (M72.0) Dupuytren's contracture of left hand  Comment:   Plan: will monitor for now and refer to hand surgery for injection therapy if this progresses      Basilio Black MD  June 20, 2024  3:01 PM          Pt is a 68 year old male here today for:   R pinky finger:  Patient reports several months of stiffness and locking of the pinky finger. He has been wearing a finger splint at night. He's had this happen in other fingers as well and has seen providers at [facility] for this issue, he has had cortisone injections int he past and surgery to fix one of the trigger fingers.   HPI:   Right Hand pain:   Location: pinky finger   Duration:several months   Trauma/ Fall? No   Swelling? No   Numbness/ Tingling? No   Weakness? No   Imaging? No   Treatment? None for the pinky finger      Per Dr Beck's note on 5/31/24:  Trigger finger, acquired  Patient reports prior history of trigger finger surgery.  Recommend evaluation by hand specialist.  Referral was placed today.  - Orthopedic  Referral; Future    Past Medical History:   Diagnosis Date    BMI 33.0-33.9,adult 04/16/2019    Congestive heart failure (H) 9/2016    Diabetes (H)     Heart disease     Hypertension     Open left ankle fracture ~'05    infected      Past Surgical History:   Procedure Laterality Date    CARDIAC SURGERY  9/2016    COLONOSCOPY  05/2012    repeat 5 yrs    VASCULAR SURGERY  9/2/2016    Heart Attack AngioPlasti Procedure 2 stents installed    [facility] MUSCLE-SKIN FLAP,LEG  \"       \"    [facility] OPEN RX ANKLE DISLOCATN+FIXATN  ~'05    infected      Current Outpatient Medications   Medication Sig Dispense Refill    aspirin 81 MG tablet Take 81 " mg by mouth daily      blood glucose (NO BRAND SPECIFIED) test strip 1 strip by In Vitro route 2 times daily      calcium-vitamin D (CALTRATE) 600-400 MG-UNIT per tablet Take 1 tablet by mouth daily       ezetimibe (ZETIA) 10 MG tablet Take 1 tablet by mouth daily at 2 pm      lisinopril (ZESTRIL) 10 MG tablet TAKE 1 TABLET BY MOUTH EVERY DAY 90 tablet 3    metFORMIN (GLUCOPHAGE XR) 500 MG 24 hr tablet TAKE 1 TABLET BY MOUTH THREE TIMES A DAY WITH MEALS 270 tablet 3    metoprolol succinate ER (TOPROL XL) 50 MG 24 hr tablet TAKE 1 TABLET BY MOUTH EVERY DAY 90 tablet 3    Multiple Vitamin (MULTIVITAMINS PO) Take 1 tablet by mouth daily      rosuvastatin (CRESTOR) 20 MG tablet TAKE 1 TABLET BY MOUTH EVERY DAY 90 tablet 3    semaglutide (OZEMPIC) 2 MG/3ML pen Inject 0.5 mg Subcutaneous every 7 days 6 mL 5    valACYclovir (VALTREX) 1000 mg tablet Take 2 tablets (2,000 mg) by mouth 2 times daily Take as needed      Vitamin D3 (CHOLECALCIFEROL) 125 MCG (5000 UT) tablet Take 1 tablet by mouth daily        No Known Allergies   ROS:   Gen- no fevers/chills   Rheum - no morning stiffness   Derm - no rash/ redness   Neuro - no numbness, no tingling   Remainder of ROS negative.     Exam:   There were no vitals taken for this visit.     R WRIST/HAND:   +scar at wrist from previous CTS surgery  Inspection: Swelling - No; Atrophy - No   Sensation: intact in median, radial, ulnar distribution   ROM:   Hand: Full flexion at PIP/DIP, finger abduction/ adduction.   Strength: 5/5 in all motions   Bony tenderness:   Hand: Metacarpals: +TTP at pinky MCP; Phalanges: NO   Tendons: FDS/FDP/Extensor mechanism intact   Maneuvers: deferred   Other: + nodules palpated in palmar aspect of R pinky finger MCP    L hand:   +scar at 2nd MCP for previous trigger release  Dupuytren's contracture forming in 3rd digit flexor tendon     Procedure Note:  Pt verbally consented. R palmar hand/finger was sterilely prepped in usual fashion. 0.5 cc of 40mg/mL  Kenalog and 0.5 cc of 1% lidocaine was injected adjacent to MCP in location of painful nodule at base of pinky finger without complication. Pt tolerated procedure well.       Hand / Upper Extremity Injection: R small A1    Date/Time: 6/20/2024 2:46 PM    Performed by: Basilio Black MD  Authorized by: Basilio Black MD    Indications:  Pain  Needle Size:  25 G  Guidance: landmark    Approach:  Volar  Condition: trigger finger    Location:  Small finger    Site:  R small A1  Medications:  20 mg triamcinolone 40 MG/ML; 0.5 mL lidocaine (PF) 1 %  Outcome:  Tolerated well, no immediate complications  Procedure discussed: discussed risks, benefits, and alternatives    Consent Given by:  Patient  Timeout: timeout called immediately prior to procedure    Prep: patient was prepped and draped in usual sterile fashion

## 2024-06-20 NOTE — LETTER
6/20/2024      RE: Yimi Mcqueen  [address] [facility] [address]     Dear Colleague,    Thank you for referring your patient, Yimi Mcqueen, to the [facility] [place]. Please see a copy of my visit note below.    ASSESSMENT/PLAN:    (M65.30) Trigger finger, acquired  (primary encounter diagnosis)  Comment: cortisone today; f/up in 4-6 wks to consider repeat injection  Plan: lidocaine (PF) (XYLOCAINE) 1 % injection,         triamcinolone (KENALOG-40) 40 MG/ML injection,         Hand / Upper Extremity Injection: R small A1          (M72.0) Dupuytren's contracture of left hand  Comment:   Plan: will monitor for now and refer to hand surgery for injection therapy if this progresses      Basilio Black MD  June 20, 2024  3:01 PM          Pt is a 68 year old male here today for:   R pinky finger:  Patient reports several months of stiffness and locking of the pinky finger. He has been wearing a finger splint at night. He's had this happen in other fingers as well and has seen providers at [facility] for this issue, he has had cortisone injections int he past and surgery to fix one of the trigger fingers.   HPI:   Right Hand pain:   Location: pinky finger   Duration:several months   Trauma/ Fall? No   Swelling? No   Numbness/ Tingling? No   Weakness? No   Imaging? No   Treatment? None for the pinky finger      Per Dr Beck's note on 5/31/24:  Trigger finger, acquired  Patient reports prior history of trigger finger surgery.  Recommend evaluation by hand specialist.  Referral was placed today.  - Orthopedic  Referral; Future    Past Medical History:   Diagnosis Date    BMI 33.0-33.9,adult 04/16/2019    Congestive heart failure (H) 9/2016    Diabetes (H)     Heart disease     Hypertension     Open left ankle fracture ~'05    infected      Past Surgical History:   Procedure Laterality Date    CARDIAC SURGERY  9/2016    COLONOSCOPY  05/2012    repeat 5 yrs    VASCULAR  "SURGERY  9/2/2016    Heart Attack AngioPlasti Procedure 2 stents installed    [facility] MUSCLE-SKIN FLAP,LEG  \"       \"    [facility] OPEN RX ANKLE DISLOCATN+FIXATN  ~'05    infected      Current Outpatient Medications   Medication Sig Dispense Refill    aspirin 81 MG tablet Take 81 mg by mouth daily      blood glucose (NO BRAND SPECIFIED) test strip 1 strip by In Vitro route 2 times daily      calcium-vitamin D (CALTRATE) 600-400 MG-UNIT per tablet Take 1 tablet by mouth daily       ezetimibe (ZETIA) 10 MG tablet Take 1 tablet by mouth daily at 2 pm      lisinopril (ZESTRIL) 10 MG tablet TAKE 1 TABLET BY MOUTH EVERY DAY 90 tablet 3    metFORMIN (GLUCOPHAGE XR) 500 MG 24 hr tablet TAKE 1 TABLET BY MOUTH THREE TIMES A DAY WITH MEALS 270 tablet 3    metoprolol succinate ER (TOPROL XL) 50 MG 24 hr tablet TAKE 1 TABLET BY MOUTH EVERY DAY 90 tablet 3    Multiple Vitamin (MULTIVITAMINS PO) Take 1 tablet by mouth daily      rosuvastatin (CRESTOR) 20 MG tablet TAKE 1 TABLET BY MOUTH EVERY DAY 90 tablet 3    semaglutide (OZEMPIC) 2 MG/3ML pen Inject 0.5 mg Subcutaneous every 7 days 6 mL 5    valACYclovir (VALTREX) 1000 mg tablet Take 2 tablets (2,000 mg) by mouth 2 times daily Take as needed      Vitamin D3 (CHOLECALCIFEROL) 125 MCG (5000 UT) tablet Take 1 tablet by mouth daily        No Known Allergies   ROS:   Gen- no fevers/chills   Rheum - no morning stiffness   Derm - no rash/ redness   Neuro - no numbness, no tingling   Remainder of ROS negative.     Exam:   There were no vitals taken for this visit.     R WRIST/HAND:   +scar at wrist from previous CTS surgery  Inspection: Swelling - No; Atrophy - No   Sensation: intact in median, radial, ulnar distribution   ROM:   Hand: Full flexion at PIP/DIP, finger abduction/ adduction.   Strength: 5/5 in all motions   Bony tenderness:   Hand: Metacarpals: +TTP at pinky MCP; Phalanges: NO   Tendons: FDS/FDP/Extensor mechanism intact   Maneuvers: deferred   Other: + nodules palpated in palmar " aspect of R pinky finger MCP    L hand:   +scar at 2nd MCP for previous trigger release  Dupuytren's contracture forming in 3rd digit flexor tendon     Procedure Note:  Pt verbally consented. R palmar hand/finger was sterilely prepped in usual fashion. 0.5 cc of 40mg/mL Kenalog and 0.5 cc of 1% lidocaine was injected adjacent to MCP in location of painful nodule at base of pinky finger without complication. Pt tolerated procedure well.       Hand / Upper Extremity Injection: R small A1    Date/Time: 6/20/2024 2:46 PM    Performed by: Basilio Black MD  Authorized by: Basilio Black MD    Indications:  Pain  Needle Size:  25 G  Guidance: landmark    Approach:  Volar  Condition: trigger finger    Location:  Small finger    Site:  R small A1  Medications:  20 mg triamcinolone 40 MG/ML; 0.5 mL lidocaine (PF) 1 %  Outcome:  Tolerated well, no immediate complications  Procedure discussed: discussed risks, benefits, and alternatives    Consent Given by:  Patient  Timeout: timeout called immediately prior to procedure    Prep: patient was prepped and draped in usual sterile fashion            Again, thank you for allowing me to participate in the care of your patient.      Sincerely,    Basilio Black MD

## 2024-06-20 NOTE — NURSING NOTE
[address] [address]  Dept: [phone]  ______________________________________________________________________________    Patient: Yimi Mcqueen   : 1955   MRN: [id number]   2024    INVASIVE PROCEDURE SAFETY CHECKLIST    Date: 2024   Procedure:Right 5th digit trigger finger injection  Patient Name: Yimi Mcqueen  MRN: [id number]  YOB: 1955    Action: Complete sections as appropriate. Any discrepancy results in a HARD COPY until resolved.     PRE PROCEDURE:  Patient ID verified with 2 identifiers (name and  or MRN): Yes  Procedure and site verified with patient/designee (when able): Yes  Accurate consent documentation in medical record: Yes  H&P (or appropriate assessment) documented in medical record: Yes  H&P must be up to 20 days prior to procedure and updates within 24 hours of procedure as applicable: NA  Relevant diagnostic and radiology test results appropriately labeled and displayed as applicable: Yes  Procedure site(s) marked with provider initials: NA    TIMEOUT:  Time-Out performed immediately prior to starting procedure, including verbal and active participation of all team members addressing the following:Yes  * Correct patient identify  * Confirmed that the correct side and site are marked  * An accurate procedure consent form  * Agreement on the procedure to be done  * Correct patient position  * Relevant images and results are properly labeled and appropriately displayed  * The need to administer antibiotics or fluids for irrigation purposes during the procedure as applicable   * Safety precautions based on patient history or medication use    DURING PROCEDURE: Verification of correct person, site, and procedures any time the responsibility for care of the patient is transferred to another member of the care team.       Prior to injection, verified patient identity using patient's  name and date of birth.  Due to injection administration, patient instructed to remain in clinic for 15 minutes  afterwards, and to report any adverse reaction to me immediately.    Trigger finger injection    Drug Amount Wasted:  Yes: .5 mg/ml  kenalog and 4.5 lidocaine  Vial/Syringe: Single dose vial  Expiration Date: 12/25 kenalog and 05/27 lidocaine       Mara Trotter ATC  June 20, 2024

## 2024-06-28 DIAGNOSIS — E11.9 TYPE 2 DIABETES MELLITUS WITHOUT COMPLICATION, WITHOUT LONG-TERM CURRENT USE OF INSULIN (H): ICD-10-CM

## 2024-06-28 RX ORDER — METFORMIN HCL 500 MG
TABLET, EXTENDED RELEASE 24 HR ORAL
Qty: 270 TABLET | Refills: 3 | OUTPATIENT
Start: 2024-06-28

## 2024-07-13 ENCOUNTER — OFFICE VISIT (OUTPATIENT)
Dept: URGENT CARE | Facility: URGENT CARE | Age: 69
End: 2024-07-13
Payer: MEDICARE

## 2024-07-13 VITALS
OXYGEN SATURATION: 99 % | DIASTOLIC BLOOD PRESSURE: 63 MMHG | WEIGHT: 213 LBS | BODY MASS INDEX: 28.85 KG/M2 | HEIGHT: 72 IN | SYSTOLIC BLOOD PRESSURE: 118 MMHG | TEMPERATURE: 100.8 F | HEART RATE: 78 BPM

## 2024-07-13 DIAGNOSIS — U07.1 INFECTION DUE TO 2019 NOVEL CORONAVIRUS: Primary | ICD-10-CM

## 2024-07-13 PROCEDURE — 99214 OFFICE O/P EST MOD 30 MIN: CPT | Performed by: NURSE PRACTITIONER

## 2024-07-13 NOTE — PROGRESS NOTES
Chief Complaint   Patient presents with    Urgent Care    Covid Concern     Pt in clinic requesting treatment for Covid-19.     SUBJECTIVE:  Yimi Mcqueen is a 68 year old male presenting with request for Paxlovid.  Has had symptoms for 2 days and tested positive for COVID-19 yesterday.  Symptoms include fever cough fatigue myalgias sore throat congestion lethargy.  Recent GFR was within normal limits 5/24/2024.  No chest pain shortness of breath hemoptysis.  Medications reviewed -metformin category B monitor blood pressure as well as rosuvastatin category D hold while on treatment.    Past Medical History:   Diagnosis Date    BMI 33.0-33.9,adult 04/16/2019    Congestive heart failure (H) 9/2016    Diabetes (H)     Heart disease     Hypertension     Open left ankle fracture ~'05    infected     Current Outpatient Medications   Medication Sig Dispense Refill    aspirin 81 MG tablet Take 81 mg by mouth daily      blood glucose (NO BRAND SPECIFIED) test strip 1 strip by In Vitro route 2 times daily      calcium-vitamin D (CALTRATE) 600-400 MG-UNIT per tablet Take 1 tablet by mouth daily       ezetimibe (ZETIA) 10 MG tablet Take 1 tablet by mouth daily at 2 pm      lisinopril (ZESTRIL) 10 MG tablet TAKE 1 TABLET BY MOUTH EVERY DAY 90 tablet 3    metFORMIN (GLUCOPHAGE XR) 500 MG 24 hr tablet TAKE 1 TABLET BY MOUTH THREE TIMES A DAY WITH MEALS (Patient taking differently: 2 times daily (with meals) TAKE 1 TABLET BY MOUTH THREE TIMES A DAY WITH MEALS) 270 tablet 3    metoprolol succinate ER (TOPROL XL) 50 MG 24 hr tablet TAKE 1 TABLET BY MOUTH EVERY DAY 90 tablet 3    Multiple Vitamin (MULTIVITAMINS PO) Take 1 tablet by mouth daily      nirmatrelvir and ritonavir (PAXLOVID) 300 mg/100 mg therapy pack Take 3 tablets by mouth 2 times daily for 5 days 30 tablet 0    rosuvastatin (CRESTOR) 20 MG tablet TAKE 1 TABLET BY MOUTH EVERY DAY 90 tablet 3    semaglutide (OZEMPIC) 2 MG/3ML pen Inject 0.5 mg Subcutaneous every 7 days  6 mL 5    Vitamin D3 (CHOLECALCIFEROL) 125 MCG (5000 UT) tablet Take 1 tablet by mouth daily       Current Facility-Administered Medications   Medication Dose Route Frequency Provider Last Rate Last Admin    lidocaine (PF) (XYLOCAINE) 1 % injection 0.5 mL  0.5 mL      0.5 mL at 24 1446    triamcinolone (KENALOG-40) injection 20 mg  20 mg      20 mg at 24 1446     Social History     Tobacco Use    Smoking status: Former     Current packs/day: 0.00     Average packs/day: 1.5 packs/day for 20.0 years (30.0 ttl pk-yrs)     Types: Cigarettes     Start date: 1984     Quit date: 2004     Years since quittin.0    Smokeless tobacco: Never    Tobacco comments:     Quit when Dr Syed Almeida ([facility]) told me your cigarettes or leg repair.   Substance Use Topics    Alcohol use: Yes     Comment: Occ.      No Known Allergies    Review of Systems  All systems negative except for those listed above in HPI.    OBJECTIVE:   /63   Pulse 78   Temp (!) 100.8  F (38.2  C) (Temporal)   Ht 1.829 m (6')   Wt 96.6 kg (213 lb)   SpO2 99%   BMI 28.89 kg/m    Physical Exam  Vitals reviewed.   Constitutional:       General: He is not in acute distress.     Appearance: Normal appearance. He is not ill-appearing, toxic-appearing or diaphoretic.   HENT:      Head: Normocephalic and atraumatic.      Right Ear: Tympanic membrane and ear canal normal.      Left Ear: Tympanic membrane and ear canal normal.      Nose: Congestion and rhinorrhea present.      Mouth/Throat:      Mouth: Mucous membranes are moist.      Pharynx: Oropharynx is clear.   Eyes:      Extraocular Movements: Extraocular movements intact.      Conjunctiva/sclera: Conjunctivae normal.      Pupils: Pupils are equal, round, and reactive to light.   Cardiovascular:      Rate and Rhythm: Normal rate.      Pulses: Normal pulses.   Pulmonary:      Effort: Respiratory distress present.      Breath sounds: No stridor. No wheezing,  rhonchi or rales.   Chest:      Chest wall: No tenderness.   Musculoskeletal:         General: Normal range of motion.      Cervical back: Normal range of motion and neck supple.   Skin:     General: Skin is warm and dry.      Findings: No rash.   Neurological:      General: No focal deficit present.      Mental Status: He is alert and oriented to person, place, and time.   Psychiatric:         Mood and Affect: Mood normal.         Behavior: Behavior normal.       ASSESSMENT:    ICD-10-CM    1. Infection due to 2019 novel coronavirus  U07.1 nirmatrelvir and ritonavir (PAXLOVID) 300 mg/100 mg therapy pack        PLAN:     Paxlovid for COVID-19 per request  Risks versus benefits discussed  Monitor for adverse effects such as foul taste or diarrhea and stop if severe  Keep close PCP follow-up  Exam reassuring, vital stable  Stop statin while on COVID  Monitor blood pressure  Rest! Your body needs more rest to heal.  Drink plenty of fluids (warm fluids like tea or soup are soothing and reduce cough)  Sit in the bathroom with a hot shower running and breathe in the steam.  Honey may soothe your sore throat and help manage your cough- may take straight or in warm water with lemon juice.  Avoid smoke (cigarettes, bonfires, fireplace, wood burning stoves).  Take Tylenol  as needed for pain.  Good handwashing is the best way to prevent spread of germs  Present to emergency room if you develop trouble breathing, swallowing or cough-up blood.  Follow up with your primary care provider if symptoms worsen or fail to improve as expected.    Follow up with primary care provider with any problems, questions or concerns or if symptoms worsen or fail to improve. Patient agreed to plan and verbalized understanding.    Ni Wylie, ASA[facility]

## 2024-09-21 DIAGNOSIS — I25.2 OLD MYOCARDIAL INFARCTION: ICD-10-CM

## 2024-09-21 DIAGNOSIS — E78.5 HYPERLIPIDEMIA WITH TARGET LDL LESS THAN 100: ICD-10-CM

## 2024-09-21 DIAGNOSIS — I10 HYPERTENSION, BENIGN ESSENTIAL, GOAL BELOW 140/90: ICD-10-CM

## 2024-09-21 DIAGNOSIS — E11.9 TYPE 2 DIABETES MELLITUS WITHOUT COMPLICATION, WITHOUT LONG-TERM CURRENT USE OF INSULIN (H): ICD-10-CM

## 2024-09-23 RX ORDER — METFORMIN HCL 500 MG
TABLET, EXTENDED RELEASE 24 HR ORAL
Qty: 270 TABLET | Refills: 3 | Status: SHIPPED | OUTPATIENT
Start: 2024-09-23

## 2024-09-23 RX ORDER — ROSUVASTATIN CALCIUM 20 MG/1
TABLET, COATED ORAL
Qty: 90 TABLET | Refills: 0 | Status: SHIPPED | OUTPATIENT
Start: 2024-09-23

## 2024-09-23 RX ORDER — METOPROLOL SUCCINATE 50 MG/1
TABLET, EXTENDED RELEASE ORAL
Qty: 90 TABLET | Refills: 0 | Status: SHIPPED | OUTPATIENT
Start: 2024-09-23

## 2024-10-04 ENCOUNTER — OFFICE VISIT (OUTPATIENT)
Dept: URGENT CARE | Facility: URGENT CARE | Age: 69
End: 2024-10-04
Payer: MEDICARE

## 2024-10-04 ENCOUNTER — TELEPHONE (OUTPATIENT)
Dept: INTERNAL MEDICINE | Facility: CLINIC | Age: 69
End: 2024-10-04

## 2024-10-04 VITALS
OXYGEN SATURATION: 98 % | BODY MASS INDEX: 28.31 KG/M2 | HEART RATE: 65 BPM | HEIGHT: 72 IN | RESPIRATION RATE: 17 BRPM | SYSTOLIC BLOOD PRESSURE: 103 MMHG | WEIGHT: 209 LBS | TEMPERATURE: 98.7 F | DIASTOLIC BLOOD PRESSURE: 61 MMHG

## 2024-10-04 DIAGNOSIS — R19.7 DIARRHEA, UNSPECIFIED TYPE: Primary | ICD-10-CM

## 2024-10-04 PROCEDURE — 99213 OFFICE O/P EST LOW 20 MIN: CPT | Performed by: PHYSICIAN ASSISTANT

## 2024-10-04 NOTE — TELEPHONE ENCOUNTER
Pt's wife calling regarding pt's frequent BM's. A E-visit it going to be submitted. Thanks    Lavinia Dominguez RN  [facility]

## 2024-10-04 NOTE — PROGRESS NOTES
"Assessment & Plan     1. Diarrhea, unspecified type    - Enteric Bacteria and Virus Panel by [facility] Stool; Future  - Enteric Bacteria and Virus Panel by [facility] Stool     Loperamide as needed. Stool test pending.                   SHAYAN Dial [facility]    Rodger Geller is a 69 year old male who presents to clinic today for the following health issues:  Chief Complaint   Patient presents with    Diarrhea     X6 days of diarrhea     Fall     Soreness in right side rib from fall        HPI    \"I have had the runs for 6 days\". Gas pains in stomach with explosions at times. 4-8x per day. Usually makes it to the bathroom okay. Taken immodium up to 4 per day. Slight decrease in watery stools with pills. Some fatigue. No travel. No others with symptoms in the house like him. No blackness or redness of stools. Started ozempic 6 months ago for diabetes. Since starting ozempic some constipation but no diarrhea bouts. No fever. No anti-bacterials lately.     Right side pain. Fell 6 weeks ago. Felt his ribs were bruised. Feels like a lump is there post fall. Much reduced pain to side. 2/10 intensity now today.           Review of Systems        Objective    /61   Pulse 65   Temp 98.7  F (37.1  C) (Temporal)   Resp 17   Ht 1.829 m (6')   Wt 94.8 kg (209 lb)   SpO2 98%   BMI 28.35 kg/m    Physical Exam  Abdominal:      General: Abdomen is flat.      Palpations: Abdomen is soft.      Tenderness: There is no abdominal tenderness. There is no right CVA tenderness or left CVA tenderness.          Comments: Subcutaneous nodule mobile without tenderness.                    "

## 2024-10-16 ENCOUNTER — OFFICE VISIT (OUTPATIENT)
Dept: OTOLARYNGOLOGY | Facility: OTHER | Age: 69
End: 2024-10-16
Payer: MEDICARE

## 2024-10-16 VITALS
DIASTOLIC BLOOD PRESSURE: 60 MMHG | TEMPERATURE: 97.9 F | WEIGHT: 218.4 LBS | HEIGHT: 72 IN | SYSTOLIC BLOOD PRESSURE: 114 MMHG | BODY MASS INDEX: 29.58 KG/M2

## 2024-10-16 DIAGNOSIS — H61.23 BILATERAL IMPACTED CERUMEN: Primary | ICD-10-CM

## 2024-10-16 DIAGNOSIS — H92.03 OTALGIA, BILATERAL: ICD-10-CM

## 2024-10-16 PROCEDURE — 69210 REMOVE IMPACTED EAR WAX UNI: CPT | Performed by: OTOLARYNGOLOGY

## 2024-10-16 NOTE — PROGRESS NOTES
ENT Consultation    Yimi Mcqueen who is a 69 year old male seen in consultation at the request of self.      History of Present Illness - Yimi Mcqueen is a 69 year old male presents with plugged ears for the last 6 weeks.  Mostly on the right side but some on the left as well.  He has had cerumen impaction issues in the past but not for many years.  Denies any ear discharge or dizziness or hearing loss.        BP Readings from Last 1 Encounters:   10/16/24 114/60       BP noted to be well controlled today in office.     Yimi IS NOT a smoker/uses chewing tobacco.  Yimi already quit      Past Medical History -   Past Medical History:   Diagnosis Date    BMI 33.0-33.9,adult 04/16/2019    Congestive heart failure (H) 9/2016    Diabetes (H)     Heart disease     Hypertension     Open left ankle fracture ~'05    infected       Current Medications -   Current Outpatient Medications:     aspirin 81 MG tablet, Take 81 mg by mouth daily, Disp: , Rfl:     blood glucose (NO BRAND SPECIFIED) test strip, 1 strip by In Vitro route 2 times daily, Disp: , Rfl:     calcium-vitamin D (CALTRATE) 600-400 MG-UNIT per tablet, Take 1 tablet by mouth daily , Disp: , Rfl:     ezetimibe (ZETIA) 10 MG tablet, Take 1 tablet by mouth daily at 2 pm, Disp: , Rfl:     lisinopril (ZESTRIL) 10 MG tablet, TAKE 1 TABLET BY MOUTH EVERY DAY, Disp: 90 tablet, Rfl: 3    metFORMIN (GLUCOPHAGE XR) 500 MG 24 hr tablet, TAKE 1 TABLET BY MOUTH THREE TIMES A DAY WITH MEALS, Disp: 270 tablet, Rfl: 3    metoprolol succinate ER (TOPROL XL) 50 MG 24 hr tablet, TAKE 1 TABLET BY MOUTH EVERY DAY, Disp: 90 tablet, Rfl: 0    Multiple Vitamin (MULTIVITAMINS PO), Take 1 tablet by mouth daily, Disp: , Rfl:     rosuvastatin (CRESTOR) 20 MG tablet, TAKE 1 TABLET BY MOUTH EVERY DAY, Disp: 90 tablet, Rfl: 0    semaglutide (OZEMPIC) 2 MG/3ML pen, Inject 0.5 mg Subcutaneous every 7 days, Disp: 6 mL, Rfl: 5    Vitamin D3 (CHOLECALCIFEROL) 125 MCG (5000 UT) tablet,  Take 1 tablet by mouth daily, Disp: , Rfl:     Current Facility-Administered Medications:     lidocaine (PF) (XYLOCAINE) 1 % injection 0.5 mL, 0.5 mL, , , , 0.5 mL at 24 1446    triamcinolone (KENALOG-40) injection 20 mg, 20 mg, , , , 20 mg at 24 1446    Allergies - No Known Allergies    Social History -   Social History     Socioeconomic History    Marital status:    Tobacco Use    Smoking status: Former     Current packs/day: 0.00     Average packs/day: 1.5 packs/day for 20.0 years (30.0 ttl pk-yrs)     Types: Cigarettes     Start date: 1984     Quit date: 2004     Years since quittin.2    Smokeless tobacco: Never    Tobacco comments:     Quit when Dr Syed Almeida (Att[facility] [employer]) told me your cigarettes or leg repair.   Vaping Use    Vaping status: Never Used   Substance and Sexual Activity    Alcohol use: Yes     Comment: Occ.     Drug use: No    Sexual activity: Yes     Partners: Female     Birth control/protection: Post-menopausal   Other Topics Concern    Parent/sibling w/ CABG, MI or angioplasty before 65F 55M? No     Social Determinants of Health     Financial Resource Strain: Low Risk  (3/28/2024)    Financial Resource Strain     Within the past 12 months, have you or your family members you live with been unable to get utilities (heat, electricity) when it was really needed?: No   Food Insecurity: Low Risk  (3/28/2024)    Food Insecurity     Within the past 12 months, did you worry that your food would run out before you got money to buy more?: No     Within the past 12 months, did the food you bought just not last and you didn t have money to get more?: No   Transportation Needs: Low Risk  (3/28/2024)    Transportation Needs     Within the past 12 months, has lack of transportation kept you from medical appointments, getting your medicines, non-medical meetings or appointments, work, or from getting things that you need?: No    Received from [facility]  [facility], [facility] & [facility]    Social Connections   Interpersonal Safety: Low Risk  (9/29/2023)    Interpersonal Safety     Do you feel physically and emotionally safe where you currently live?: Yes     Within the past 12 months, have you been hit, slapped, kicked or otherwise physically hurt by someone?: No     Within the past 12 months, have you been humiliated or emotionally abused in other ways by your partner or ex-partner?: No   Housing Stability: Low Risk  (3/28/2024)    Housing Stability     Do you have housing? : Yes     Are you worried about losing your housing?: No       Family History -   Family History   Problem Relation Age of Onset    Cancer - colorectal Mother     Colon Cancer Mother     Diabetes Mother     Depression Mother     Anxiety Disorder Mother     Mental Illness Mother     Obesity Mother     Heart Disease Father        Review of Systems - As per HPI and PMHx, otherwise review of system review of the head and neck negative. Otherwise 10+ review of system is negative    Physical Exam  /60   Temp 97.9  F (36.6  C) (Temporal)   Ht 1.829 m (6')   Wt 99.1 kg (218 lb 6.4 oz)   BMI 29.62 kg/m    BMI: Body mass index is 29.62 kg/m .    General - The patient is well nourished and well developed, and appears to have good nutritional status.  Alert and oriented to person and place, answers questions and cooperates with examination appropriately.    SKIN - No suspicious lesions or rashes.  Respiration - No respiratory distress.  Head and Face - Normocephalic and atraumatic, with no gross asymmetry noted of the contour of the facial features.  The facial nerve is intact, with strong symmetric movements.    Voice and Breathing - The patient was breathing comfortably without the use of accessory muscles. The patients voice was clear and strong, and had appropriate pitch and quality.    Ears - Bilateral pinna and EACs with normal appearing overlying  skin. Tympanic membrane intact with good mobility on pneumatic otoscopy bilaterally. Bony landmarks of the ossicular chain are normal. The tympanic membranes are normal in appearance. No retraction, perforation, or masses.  No fluid or purulence was seen in the external canal or the middle ear.   After significant cerumen disimpaction.  Eyes - Extraocular movements intact.  Sclera were not icteric or injected, conjunctiva were pink and moist.        Performed in clinic today:  Ears - On examination of the ears, I found that both ears were impacted with cerumen.  , I positioned the patient in the examination chair in a semi-supine position. I used the magnified speculum /binocular surgical microscope to perform cerumen removal.  On the right side, I began by using a cerumen loop to gently lift the edges of the cerumen mass away from the walls of the external canal.  Once I did this, I was able to use curette to  pull/suction away fragments of wax and debris. I removed all the wax and debri.  The tympanic membrane was intact, no sign of perforation or middle ear effusion., and On the left side once again using the magnified speculum/ binocular surgical microscope to perform cerumen removal.  I began by using a cerumen loop to gently lift the edges of the cerumen mass away from the walls of the external canal.  Once I did this, I was able to use curette to pull/suction away fragments of wax and debris. I removed all the wax and debri. The tympanic membrane was intact, no sign of perforation or middle ear effusion.      A/P - Yimi Mcqueen is a 69 year old male with cerumen impaction was successfully disimpacted today feels immediately better.  He will follow-up as needed.      Michael Mota MD

## 2024-10-16 NOTE — LETTER
10/16/2024      Yimi Mcqueen  [address] [facility] [id number]      Dear Colleague,    Thank you for referring your patient, Yimi Mcqueen, to the [facility]. Please see a copy of my visit note below.    ENT Consultation    Yimi Mcqueen who is a 69 year old male seen in consultation at the request of self.      History of Present Illness - Yimi Mcqueen is a 69 year old male presents with plugged ears for the last 6 weeks.  Mostly on the right side but some on the left as well.  He has had cerumen impaction issues in the past but not for many years.  Denies any ear discharge or dizziness or hearing loss.        BP Readings from Last 1 Encounters:   10/16/24 114/60       BP noted to be well controlled today in office.     Yimi IS NOT a smoker/uses chewing tobacco.  Yimi already quit      Past Medical History -   Past Medical History:   Diagnosis Date     BMI 33.0-33.9,adult 04/16/2019     Congestive heart failure (H) 9/2016     Diabetes (H)      Heart disease      Hypertension      Open left ankle fracture ~'05    infected       Current Medications -   Current Outpatient Medications:      aspirin 81 MG tablet, Take 81 mg by mouth daily, Disp: , Rfl:      blood glucose (NO BRAND SPECIFIED) test strip, 1 strip by In Vitro route 2 times daily, Disp: , Rfl:      calcium-vitamin D (CALTRATE) 600-400 MG-UNIT per tablet, Take 1 tablet by mouth daily , Disp: , Rfl:      ezetimibe (ZETIA) 10 MG tablet, Take 1 tablet by mouth daily at 2 pm, Disp: , Rfl:      lisinopril (ZESTRIL) 10 MG tablet, TAKE 1 TABLET BY MOUTH EVERY DAY, Disp: 90 tablet, Rfl: 3     metFORMIN (GLUCOPHAGE XR) 500 MG 24 hr tablet, TAKE 1 TABLET BY MOUTH THREE TIMES A DAY WITH MEALS, Disp: 270 tablet, Rfl: 3     metoprolol succinate ER (TOPROL XL) 50 MG 24 hr tablet, TAKE 1 TABLET BY MOUTH EVERY DAY, Disp: 90 tablet, Rfl: 0     Multiple Vitamin (MULTIVITAMINS PO), Take 1 tablet by mouth daily, Disp: ,  Rfl:      rosuvastatin (CRESTOR) 20 MG tablet, TAKE 1 TABLET BY MOUTH EVERY DAY, Disp: 90 tablet, Rfl: 0     semaglutide (OZEMPIC) 2 MG/3ML pen, Inject 0.5 mg Subcutaneous every 7 days, Disp: 6 mL, Rfl: 5     Vitamin D3 (CHOLECALCIFEROL) 125 MCG (5000 UT) tablet, Take 1 tablet by mouth daily, Disp: , Rfl:     Current Facility-Administered Medications:      lidocaine (PF) (XYLOCAINE) 1 % injection 0.5 mL, 0.5 mL, , , , 0.5 mL at 24 1446     triamcinolone (KENALOG-40) injection 20 mg, 20 mg, , , , 20 mg at 24 1446    Allergies - No Known Allergies    Social History -   Social History     Socioeconomic History     Marital status:    Tobacco Use     Smoking status: Former     Current packs/day: 0.00     Average packs/day: 1.5 packs/day for 20.0 years (30.0 ttl pk-yrs)     Types: Cigarettes     Start date: 1984     Quit date: 2004     Years since quittin.2     Smokeless tobacco: Never     Tobacco comments:     Quit when Dr Syed Almeida (Attending [facility]. [employer]) told me your cigarettes or leg repair.   Vaping Use     Vaping status: Never Used   Substance and Sexual Activity     Alcohol use: Yes     Comment: Occ.      Drug use: No     Sexual activity: Yes     Partners: Female     Birth control/protection: Post-menopausal   Other Topics Concern     Parent/sibling w/ CABG, MI or angioplasty before 65F 55M? No     Social Determinants of Health     Financial Resource Strain: Low Risk  (3/28/2024)    Financial Resource Strain      Within the past 12 months, have you or your family members you live with been unable to get utilities (heat, electricity) when it was really needed?: No   Food Insecurity: Low Risk  (3/28/2024)    Food Insecurity      Within the past 12 months, did you worry that your food would run out before you got money to buy more?: No      Within the past 12 months, did the food you bought just not last and you didn t have money to get more?: No   Transportation Needs:  Low Risk  (3/28/2024)    Transportation Needs      Within the past 12 months, has lack of transportation kept you from medical appointments, getting your medicines, non-medical meetings or appointments, work, or from getting things that you need?: No    Received from [facility], [facility]    Social Connections   Interpersonal Safety: Low Risk  (9/29/2023)    Interpersonal Safety      Do you feel physically and emotionally safe where you currently live?: Yes      Within the past 12 months, have you been hit, slapped, kicked or otherwise physically hurt by someone?: No      Within the past 12 months, have you been humiliated or emotionally abused in other ways by your partner or ex-partner?: No   Housing Stability: Low Risk  (3/28/2024)    Housing Stability      Do you have housing? : Yes      Are you worried about losing your housing?: No       Family History -   Family History   Problem Relation Age of Onset     Cancer - colorectal Mother      Colon Cancer Mother      Diabetes Mother      Depression Mother      Anxiety Disorder Mother      Mental Illness Mother      Obesity Mother      Heart Disease Father        Review of Systems - As per HPI and PMHx, otherwise review of system review of the head and neck negative. Otherwise 10+ review of system is negative    Physical Exam  /60   Temp 97.9  F (36.6  C) (Temporal)   Ht 1.829 m (6')   Wt 99.1 kg (218 lb 6.4 oz)   BMI 29.62 kg/m    BMI: Body mass index is 29.62 kg/m .    General - The patient is well nourished and well developed, and appears to have good nutritional status.  Alert and oriented to person and place, answers questions and cooperates with examination appropriately.    SKIN - No suspicious lesions or rashes.  Respiration - No respiratory distress.  Head and Face - Normocephalic and atraumatic, with no gross asymmetry noted of the contour of the facial features.  The facial  nerve is intact, with strong symmetric movements.    Voice and Breathing - The patient was breathing comfortably without the use of accessory muscles. The patients voice was clear and strong, and had appropriate pitch and quality.    Ears - Bilateral pinna and EACs with normal appearing overlying skin. Tympanic membrane intact with good mobility on pneumatic otoscopy bilaterally. Bony landmarks of the ossicular chain are normal. The tympanic membranes are normal in appearance. No retraction, perforation, or masses.  No fluid or purulence was seen in the external canal or the middle ear.   After significant cerumen disimpaction.  Eyes - Extraocular movements intact.  Sclera were not icteric or injected, conjunctiva were pink and moist.        Performed in clinic today:  Ears - On examination of the ears, I found that both ears were impacted with cerumen.  , I positioned the patient in the examination chair in a semi-supine position. I used the magnified speculum /binocular surgical microscope to perform cerumen removal.  On the right side, I began by using a cerumen loop to gently lift the edges of the cerumen mass away from the walls of the external canal.  Once I did this, I was able to use curette to  pull/suction away fragments of wax and debris. I removed all the wax and debri.  The tympanic membrane was intact, no sign of perforation or middle ear effusion., and On the left side once again using the magnified speculum/ binocular surgical microscope to perform cerumen removal.  I began by using a cerumen loop to gently lift the edges of the cerumen mass away from the walls of the external canal.  Once I did this, I was able to use curette to pull/suction away fragments of wax and debris. I removed all the wax and debri. The tympanic membrane was intact, no sign of perforation or middle ear effusion.      A/P - Yimi Mcqueen is a 69 year old male with cerumen impaction was successfully disimpacted today feels  immediately better.  He will follow-up as needed.      Michael Mota MD       Again, thank you for allowing me to participate in the care of your patient.        Sincerely,        Michael Mota MD, MD

## 2024-11-16 ENCOUNTER — OFFICE VISIT (OUTPATIENT)
Dept: URGENT CARE | Facility: URGENT CARE | Age: 69
End: 2024-11-16
Payer: MEDICARE

## 2024-11-16 VITALS
SYSTOLIC BLOOD PRESSURE: 111 MMHG | HEART RATE: 54 BPM | RESPIRATION RATE: 18 BRPM | DIASTOLIC BLOOD PRESSURE: 67 MMHG | TEMPERATURE: 98.1 F | OXYGEN SATURATION: 98 %

## 2024-11-16 DIAGNOSIS — J20.9 ACUTE BRONCHITIS WITH SYMPTOMS > 10 DAYS: Primary | ICD-10-CM

## 2024-11-16 PROCEDURE — 99213 OFFICE O/P EST LOW 20 MIN: CPT | Performed by: NURSE PRACTITIONER

## 2024-11-16 RX ORDER — AZITHROMYCIN 250 MG/1
TABLET, FILM COATED ORAL
Qty: 6 TABLET | Refills: 0 | Status: SHIPPED | OUTPATIENT
Start: 2024-11-16 | End: 2024-11-21

## 2024-11-16 RX ORDER — ALBUTEROL SULFATE 90 UG/1
2 INHALANT RESPIRATORY (INHALATION) EVERY 6 HOURS PRN
Qty: 18 G | Refills: 0 | Status: SHIPPED | OUTPATIENT
Start: 2024-11-16 | End: 2024-11-23

## 2024-11-16 RX ORDER — FLUTICASONE PROPIONATE 50 MCG
1 SPRAY, SUSPENSION (ML) NASAL DAILY
Qty: 16 G | Refills: 0 | Status: SHIPPED | OUTPATIENT
Start: 2024-11-16 | End: 2024-12-16

## 2024-11-16 ASSESSMENT — ENCOUNTER SYMPTOMS
FATIGUE: 1
SINUS PRESSURE: 1
CHILLS: 1
CARDIOVASCULAR NEGATIVE: 1
SORE THROAT: 1
COUGH: 1
EYES NEGATIVE: 1
WHEEZING: 1
RHINORRHEA: 1

## 2024-11-16 NOTE — PATIENT INSTRUCTIONS
Z-rosalba as directed.    Flonase nasal spray one spray in each nostril twice daily for 14 days then once daily for the next 14 days after that    Albuterol inhaler 2 puffs every 6 hours while awake consistently over the next 3-5 days.    Increase fluids with water, Pedialyte, Gatorade, or rehydrating beverages.     Alternate Tylenol and Ibuprofen as needed for aches, pains or fever.     If needed, follow soft food diet. Rest as much as possible. Run humidifier at night. Gargle with hot salty water. Have warm tea or water with honey. Follow up in clinic if symptoms persist or worsen. This usually can last 7-10 days.

## 2024-11-16 NOTE — PROGRESS NOTES
Assessment & Plan       ICD-10-CM    1. Acute bronchitis with symptoms > 10 days  J20.9 azithromycin (ZITHROMAX) 250 MG tablet     albuterol (PROAIR HFA/PROVENTIL HFA/VENTOLIN HFA) 108 (90 Base) MCG/ACT inhaler     fluticasone (FLONASE) 50 MCG/ACT nasal spray             Patient Instructions   Z-rosalba as directed.    Flonase nasal spray one spray in each nostril twice daily for 14 days then once daily for the next 14 days after that    Albuterol inhaler 2 puffs every 6 hours while awake consistently over the next 3-5 days.    Increase fluids with water, Pedialyte, Gatorade, or rehydrating beverages.     Alternate Tylenol and Ibuprofen as needed for aches, pains or fever.     If needed, follow soft food diet. Rest as much as possible. Run humidifier at night. Gargle with hot salty water. Have warm tea or water with honey. Follow up in clinic if symptoms persist or worsen. This usually can last 7-10 days.       Follow up with any problems, questions or concerns or if symptoms worsen or fail to improve. Patient agreed to the treatment plan and verbalized understanding.       Rodger Geller is a 69 year old male who presents to clinic today for the following health issues:  Chief Complaint   Patient presents with    Urgent Care    Cough     Patient presents with a cough and congestion for 2x weeks.      HPI  Patient has had a cough with nasal and chest congestion for 2 weeks. Patient feels like it is not improving.  He has been trying over-the-counter medications with mild, minimal relief of his symptoms.  He states he has had sweats and chills but has not been checking his temperature.  He has been eating and drinking without difficulty.      Review of Systems   Constitutional:  Positive for chills and fatigue.   HENT:  Positive for congestion, rhinorrhea, sinus pressure and sore throat. Negative for ear pain.    Eyes: Negative.    Respiratory:  Positive for cough and wheezing (on occasion, none now).     Cardiovascular: Negative.        Problem List:  2023: Stress  2023: Gilbert's syndrome  2023: Microalbuminuria  2023: HSV (herpes simplex virus) infection  2023: Type 2 diabetes mellitus without complication, without long-  term current use of insulin (H)  2022: Coronary artery disease involving native coronary artery of   native heart without angina pectoris  2019: BMI 33.0-33.9,adult  2019: Myalgia, unspecified site  2016: Old myocardial infarction  2015: HTN, goal below 140/90  2015: ED (erectile dysfunction)  2014: Venous (peripheral) insufficiency  2014: Hyperlipidemia with target LDL less than 100  2014: Numerous moles  2014: BMI 38.0-38.9,adult  2014: Impacted cerumen  2012: Advance Care Planning      Past Medical History:   Diagnosis Date    BMI 33.0-33.9,adult 2019    Congestive heart failure (H) 2016    Diabetes (H)     Heart disease     Hypertension     Open left ankle fracture ~'05    infected         Social History     Tobacco Use    Smoking status: Former     Current packs/day: 0.00     Average packs/day: 1.5 packs/day for 20.0 years (30.0 ttl pk-yrs)     Types: Cigarettes     Start date: 1984     Quit date: 2004     Years since quittin.3    Smokeless tobacco: Never    Tobacco comments:     Quit when Dr Syed Almeida (Attending Orth[facility]. [employer]) told me your cigarettes or leg repair.   Substance Use Topics    Alcohol use: Yes     Comment: Occ.            Objective    /67 (BP Location: Right arm)   Pulse 54   Temp 98.1  F (36.7  C) (Oral)   Resp 18   SpO2 98%   Physical Exam  Vitals and nursing note reviewed.   Constitutional:       General: He is not in acute distress.     Appearance: Normal appearance.   HENT:      Head: Normocephalic and atraumatic.      Right Ear: Tympanic membrane and ear canal normal.      Left Ear: Tympanic membrane and ear canal normal.      Ears:      Comments: Clear fluid behind each TM.      Nose: Congestion and rhinorrhea (clear to yellow mucous) present.      Mouth/Throat:      Mouth: Mucous membranes are moist.      Pharynx: Oropharynx is clear. Posterior oropharyngeal erythema (mild) present. No oropharyngeal exudate.   Eyes:      Extraocular Movements: Extraocular movements intact.      Conjunctiva/sclera: Conjunctivae normal.      Pupils: Pupils are equal, round, and reactive to light.   Cardiovascular:      Rate and Rhythm: Normal rate and regular rhythm.      Heart sounds: Normal heart sounds.   Pulmonary:      Effort: Pulmonary effort is normal.      Comments: Clear but slightly diminished in the bases  Musculoskeletal:      Cervical back: Normal range of motion and neck supple.   Lymphadenopathy:      Cervical: No cervical adenopathy.   Neurological:      Mental Status: He is alert and oriented to person, place, and time.              Gissel Mcnamara NP

## 2024-11-26 NOTE — PROGRESS NOTES
Assessment & Plan     Type 2 diabetes mellitus without complication, without long-term current use of insulin (H)    A1C was 6.3 in May.  Yimi reports his diet has not been great recently so it may go up on recheck.  He has noticed that his weight has been increasing the past month, and he'd like to try increasing the Ozempic from 0.5mg to 1mg at his next refill.  Continues on metformin.  He reports he's had an eye and foot exam in the past year.  If A1C has increased significantly, we'll recheck in 3 months.  If fairly stable, recheck in 6 months.        - HEMOGLOBIN A1C; Future  - Semaglutide, 1 MG/DOSE, (OZEMPIC) 4 MG/3ML pen; Inject 1 mg subcutaneously every 7 days.    Coronary artery disease involving native coronary artery of native heart without angina pectoris  and  Hyperlipidemia with target LDL less than 100  And  Hypertension, benign essential, goal below 140/90  And  Old myocardial infarction    Due for lipid recheck.  Currently taking statin and Zetia, the latter prescribed through cardiology.  He is going to be establishing with a new cardiologist- not sure if he's going to stay at [facility] or transfer here; can message us for a referral if needed.  BP is well controlled, continue lisinopril and metoprolol.     - rosuvastatin (CRESTOR) 20 MG tablet; Take 1 tablet (20 mg) by mouth daily.  - Lipid panel reflex to direct LDL Non-fasting; Future  - lisinopril (ZESTRIL) 10 MG tablet; Take 1 tablet (10 mg) by mouth daily.  - metoprolol succinate ER (TOPROL XL) 50 MG 24 hr tablet; Take 1 tablet (50 mg) by mouth daily.    Screening for prostate cancer  And  Nocturia    Yimi has been having nocturia x 2 and urinary hesitancy, likely related to BPH but we'll recheck PSA.  Discussed trying medication for BPH but he defers for now    - PSA, screen; Future    Neck pain    Yimi has had one month of right sided posterior neck and posterior shoulder pain that seems likely muscular based on exam and symptoms  (no radicular symptoms).  Discussed PT and/or sports med referral but he'd like to give it some more time with home management first.  Can contact us later for referral if needed.     External hemorrhoids    Not currently symptomatic, can resume OTC cream is symptoms recur.     36 minutes spent by me on the date of the encounter doing chart review, history and exam, documentation and further activities per the note    Subjective   Yimi is a 69 year old, presenting for the following health issues:  Establish Care    History of Present Illness       Reason for visit:  GP is retiring & I need new Dr.  Prostrate exam, labs for Liver Panel A1C & cholesterol, Kidney Panel, PSA.   He is taking medications regularly.    Yimi presents to establish care since his previous PCP is retiring next year.      He is due for follow-up of DM2.  He is on Ozempic 0.5mg weekly and has been on this dose for awhile and is wondering about increase to 1mg as the lower dose doesn't seem to be as effective, he has gained about 5 pounds over the past month.    Home blood sugars are checked periodically, 110-160s.     Did an eye exam last year and is going to be getting new glasses.   Reports he had a diabetic food exam with Dr. Beck earlier this year.      He'd also like to recheck his kidney and liver tests for monitoring, has had some elevated creatinine levels that were borderline and attributed to dehydration, improved now.  He has a history of Gilbert's with chronically elevated bilirubin.      He'd like to continue with PSA screening- he's had increased urinary urgency.   He is having nocturia x 2, down to once if reducing fluids the evening before.  Trouble initiating flow.       He has a history of NSTEMI and is following with cardiology at [facility] but his doctor has not been available recently so he'd like to re-establish with a cardiologist, may stay at [facility].       He has an external hemorrhoid since a few months ago.  Was  only painful once but not the past few weeks.  No bleeding currently, did once when very constipated.  He has reduced cheese and eating more prunes and that has helped.  No itching.     He saw a hand specialist for trigger finger in the past which was helpful.  He currently has some posterior right neck pain with radiation to the shoulder, started about a month or so.  No radiation down the arm, no numbness/tingling/weakness.  He was down in bed for a few days with a cold and was watching TV.  He is using TENS and a heating pad.       Constitutional, , MSK, and endo systems are negative, except as otherwise noted.       Objective    /64 (BP Location: Right arm, Patient Position: Sitting, Cuff Size: Adult Large)   Pulse 64   Temp 97.7  F (36.5  C)   Resp 16   Ht 1.829 m (6')   Wt 96.6 kg (213 lb)   SpO2 99%   BMI 28.89 kg/m    Body mass index is 28.89 kg/m .  Physical Exam     GENERAL: healthy, alert and no distress  NECK: Pain to palpation in the R upper trapezius, normal ROM            Signed Electronically by: Dariusz Germain MD

## 2024-12-02 ENCOUNTER — OFFICE VISIT (OUTPATIENT)
Dept: INTERNAL MEDICINE | Facility: CLINIC | Age: 69
End: 2024-12-02
Payer: MEDICARE

## 2024-12-02 VITALS
WEIGHT: 213 LBS | OXYGEN SATURATION: 99 % | HEART RATE: 64 BPM | RESPIRATION RATE: 16 BRPM | HEIGHT: 72 IN | TEMPERATURE: 97.7 F | SYSTOLIC BLOOD PRESSURE: 111 MMHG | BODY MASS INDEX: 28.85 KG/M2 | DIASTOLIC BLOOD PRESSURE: 64 MMHG

## 2024-12-02 DIAGNOSIS — I25.10 CORONARY ARTERY DISEASE INVOLVING NATIVE CORONARY ARTERY OF NATIVE HEART WITHOUT ANGINA PECTORIS: ICD-10-CM

## 2024-12-02 DIAGNOSIS — E11.9 TYPE 2 DIABETES MELLITUS WITHOUT COMPLICATION, WITHOUT LONG-TERM CURRENT USE OF INSULIN (H): Primary | ICD-10-CM

## 2024-12-02 DIAGNOSIS — I10 HYPERTENSION, BENIGN ESSENTIAL, GOAL BELOW 140/90: ICD-10-CM

## 2024-12-02 DIAGNOSIS — E78.5 HYPERLIPIDEMIA WITH TARGET LDL LESS THAN 100: ICD-10-CM

## 2024-12-02 DIAGNOSIS — Z12.5 SCREENING FOR PROSTATE CANCER: ICD-10-CM

## 2024-12-02 DIAGNOSIS — R35.1 NOCTURIA: ICD-10-CM

## 2024-12-02 DIAGNOSIS — K64.4 EXTERNAL HEMORRHOIDS: ICD-10-CM

## 2024-12-02 DIAGNOSIS — M54.2 NECK PAIN: ICD-10-CM

## 2024-12-02 DIAGNOSIS — I25.2 OLD MYOCARDIAL INFARCTION: ICD-10-CM

## 2024-12-02 RX ORDER — LISINOPRIL 10 MG/1
10 TABLET ORAL DAILY
Qty: 90 TABLET | Refills: 3 | Status: SHIPPED | OUTPATIENT
Start: 2024-12-02

## 2024-12-02 RX ORDER — METOPROLOL SUCCINATE 50 MG/1
50 TABLET, EXTENDED RELEASE ORAL DAILY
Qty: 90 TABLET | Refills: 3 | Status: SHIPPED | OUTPATIENT
Start: 2024-12-02

## 2024-12-02 RX ORDER — ROSUVASTATIN CALCIUM 20 MG/1
20 TABLET, COATED ORAL DAILY
Qty: 90 TABLET | Refills: 3 | Status: SHIPPED | OUTPATIENT
Start: 2024-12-02

## 2024-12-22 ENCOUNTER — HEALTH MAINTENANCE LETTER (OUTPATIENT)
Age: 69
End: 2024-12-22

## 2024-12-26 NOTE — TELEPHONE ENCOUNTER
DIAGNOSIS: LEFT HAND PAIN/SELF/MEDICARE/NO IMAGES    APPOINTMENT DATE: 12/27/24   NOTES STATUS DETAILS   OFFICE NOTE from referring provider Internal Self    MEDICATION LIST Internal

## 2024-12-27 ENCOUNTER — PRE VISIT (OUTPATIENT)
Dept: ORTHOPEDICS | Facility: CLINIC | Age: 69
End: 2024-12-27

## 2025-03-20 ENCOUNTER — LAB (OUTPATIENT)
Dept: LAB | Facility: CLINIC | Age: 70
End: 2025-03-20
Payer: MEDICARE

## 2025-03-20 DIAGNOSIS — Z12.5 SCREENING FOR PROSTATE CANCER: Primary | ICD-10-CM

## 2025-03-20 DIAGNOSIS — E11.9 TYPE 2 DIABETES MELLITUS WITHOUT COMPLICATION, WITHOUT LONG-TERM CURRENT USE OF INSULIN (H): ICD-10-CM

## 2025-03-20 DIAGNOSIS — I25.10 CORONARY ARTERY DISEASE INVOLVING NATIVE CORONARY ARTERY OF NATIVE HEART WITHOUT ANGINA PECTORIS: ICD-10-CM

## 2025-03-20 DIAGNOSIS — Z12.5 SCREENING FOR PROSTATE CANCER: ICD-10-CM

## 2025-03-20 LAB
CHOLEST SERPL-MCNC: 101 MG/DL
EST. AVERAGE GLUCOSE BLD GHB EST-MCNC: 143 MG/DL
FASTING STATUS PATIENT QL REPORTED: NORMAL
HBA1C MFR BLD: 6.6 %
HDLC SERPL-MCNC: 44 MG/DL
LDLC SERPL CALC-MCNC: 41 MG/DL
NONHDLC SERPL-MCNC: 57 MG/DL
PSA SERPL DL<=0.01 NG/ML-MCNC: 3.85 NG/ML (ref 0–4.5)
TRIGL SERPL-MCNC: 81 MG/DL

## 2025-03-20 PROCEDURE — 99000 SPECIMEN HANDLING OFFICE-LAB: CPT | Performed by: PATHOLOGY

## 2025-03-20 PROCEDURE — 83036 HEMOGLOBIN GLYCOSYLATED A1C: CPT | Performed by: INTERNAL MEDICINE

## 2025-03-24 ENCOUNTER — LAB (OUTPATIENT)
Dept: LAB | Facility: CLINIC | Age: 70
End: 2025-03-24
Payer: MEDICARE

## 2025-03-24 DIAGNOSIS — Z12.5 SCREENING FOR PROSTATE CANCER: ICD-10-CM

## 2025-03-24 LAB
ALBUMIN UR-MCNC: NEGATIVE MG/DL
APPEARANCE UR: CLEAR
BILIRUB UR QL STRIP: NEGATIVE
COLOR UR AUTO: NORMAL
GLUCOSE UR STRIP-MCNC: NEGATIVE MG/DL
HGB UR QL STRIP: NEGATIVE
KETONES UR STRIP-MCNC: NEGATIVE MG/DL
LEUKOCYTE ESTERASE UR QL STRIP: NEGATIVE
NITRATE UR QL: NEGATIVE
PH UR STRIP: 5.5 [PH] (ref 5–7)
PSA SERPL DL<=0.01 NG/ML-MCNC: 0.67 NG/ML (ref 0–4.5)
RBC URINE: <1 /HPF
SP GR UR STRIP: 1.01 (ref 1–1.03)
UROBILINOGEN UR STRIP-MCNC: NORMAL MG/DL
WBC URINE: <1 /HPF

## 2025-03-24 PROCEDURE — 36415 COLL VENOUS BLD VENIPUNCTURE: CPT | Performed by: PATHOLOGY

## 2025-03-24 PROCEDURE — G0103 PSA SCREENING: HCPCS | Performed by: PATHOLOGY

## 2025-03-24 PROCEDURE — 81001 URINALYSIS AUTO W/SCOPE: CPT | Performed by: PATHOLOGY

## 2025-03-26 ENCOUNTER — OFFICE VISIT (OUTPATIENT)
Dept: FAMILY MEDICINE | Facility: CLINIC | Age: 70
End: 2025-03-26
Payer: MEDICARE

## 2025-03-26 VITALS
BODY MASS INDEX: 30.88 KG/M2 | SYSTOLIC BLOOD PRESSURE: 96 MMHG | HEIGHT: 72 IN | WEIGHT: 228 LBS | HEART RATE: 76 BPM | DIASTOLIC BLOOD PRESSURE: 62 MMHG | OXYGEN SATURATION: 98 % | RESPIRATION RATE: 18 BRPM | TEMPERATURE: 98.1 F

## 2025-03-26 DIAGNOSIS — N52.1 ERECTILE DYSFUNCTION DUE TO DISEASES CLASSIFIED ELSEWHERE: ICD-10-CM

## 2025-03-26 DIAGNOSIS — E66.09 CLASS 1 OBESITY DUE TO EXCESS CALORIES WITH SERIOUS COMORBIDITY AND BODY MASS INDEX (BMI) OF 30.0 TO 30.9 IN ADULT: ICD-10-CM

## 2025-03-26 DIAGNOSIS — E11.9 TYPE 2 DIABETES MELLITUS WITHOUT COMPLICATION, WITHOUT LONG-TERM CURRENT USE OF INSULIN (H): Primary | ICD-10-CM

## 2025-03-26 DIAGNOSIS — E66.811 CLASS 1 OBESITY DUE TO EXCESS CALORIES WITH SERIOUS COMORBIDITY AND BODY MASS INDEX (BMI) OF 30.0 TO 30.9 IN ADULT: ICD-10-CM

## 2025-03-26 PROCEDURE — 1126F AMNT PAIN NOTED NONE PRSNT: CPT | Performed by: FAMILY MEDICINE

## 2025-03-26 PROCEDURE — 99214 OFFICE O/P EST MOD 30 MIN: CPT | Performed by: FAMILY MEDICINE

## 2025-03-26 PROCEDURE — 3074F SYST BP LT 130 MM HG: CPT | Performed by: FAMILY MEDICINE

## 2025-03-26 PROCEDURE — 3078F DIAST BP <80 MM HG: CPT | Performed by: FAMILY MEDICINE

## 2025-03-26 RX ORDER — NITROGLYCERIN 0.4 MG/1
0.4 TABLET SUBLINGUAL
COMMUNITY
Start: 2024-07-05

## 2025-03-26 RX ORDER — CLOSTRIDIUM TETANI TOXOID ANTIGEN (FORMALDEHYDE INACTIVATED), CORYNEBACTERIUM DIPHTHERIAE TOXOID ANTIGEN (FORMALDEHYDE INACTIVATED), BORDETELLA PERTUSSIS TOXOID ANTIGEN (GLUTARALDEHYDE INACTIVATED), BORDETELLA PERTUSSIS FILAMENTOUS HEMAGGLUTININ ANTIGEN (FORMALDEHYDE INACTIVATED), BORDETELLA PERTUSSIS PERTACTIN ANTIGEN, AND BORDETELLA PERTUSSIS FIMBRIAE 2/3 ANTIGEN 5; 2; 2.5; 5; 3; 5 [LF]/.5ML; [LF]/.5ML; UG/.5ML; UG/.5ML; UG/.5ML; UG/.5ML
INJECTION, SUSPENSION INTRAMUSCULAR
COMMUNITY
Start: 2024-05-31

## 2025-03-26 RX ORDER — SILDENAFIL CITRATE 20 MG/1
20-40 TABLET ORAL
Qty: 30 TABLET | Refills: 11 | Status: SHIPPED | OUTPATIENT
Start: 2025-03-26

## 2025-03-26 ASSESSMENT — PAIN SCALES - GENERAL: PAINLEVEL_OUTOF10: NO PAIN (0)

## 2025-03-26 NOTE — PROGRESS NOTES
Assessment & Plan     Type 2 diabetes mellitus without complication, without long-term current use of insulin (H)  At goal   - Adult Eye  Referral; Future  - Adult Comprehensive Weight Management  Referral; Future    Class 1 obesity due to excess calories with serious comorbidity and body mass index (BMI) of 30.0 to 30.9 in adult  Not at goal     Erectile dysfunction due to diseases classified elsewhere  At goal   - sildenafil (REVATIO) 20 MG tablet; Take 1-2 tablets (20-40 mg) by mouth nightly as needed.    There are no Patient Instructions on file for this visit.    Rodger Geller is a 69 year old, presenting for the following health issues:  Follow Up (PSA levels/Ozempic/Test meter test strips at [facility] vs. Branded/Generic viagra /[facility] or [facility] for heart care?)        3/26/2025     1:56 PM   Additional Questions   Roomed by Linden SINCLAIR     History of Present Illness       Reason for visit:  General health questions   He is taking medications regularly.      Diabetes-treated with Ozempic, wt loss plateaued. Willing for wt loss referral     Stress due to spouse's MS, disability    ERECTILE DYSFUNCTION medication helps, needs refill      Review of Systems  Constitutional, HEENT, cardiovascular, pulmonary, gi and gu systems are negative, except as otherwise noted.      Objective    BP 96/62   Pulse 76   Temp 98.1  F (36.7  C) (Temporal)   Resp 18   Ht 1.829 m (6')   Wt 103.4 kg (228 lb)   SpO2 98%   BMI 30.92 kg/m    Body mass index is 30.92 kg/m .  Physical Exam   GENERAL: alert and no distress  RESP: lungs clear to auscultation - no rales, rhonchi or wheezes  CV: regular rate and rhythm, normal S1 S2, no S3 or S4, no murmur, click or rub, no peripheral edema  ABDOMEN: soft, nontender, no hepatosplenomegaly, no masses and bowel sounds normal  MS: scarred lower leg from childhood inj  PSYCH: stress due to spouse        Signed Electronically by: Nabeel Lowe MD

## 2025-05-27 NOTE — PROGRESS NOTES
{PROVIDER CHARTING PREFERENCE:174620}          Rodger Geller is a 69 year old, presenting for the following health issues:  No chief complaint on file.    History of Present Illness       Reason for visit:  Annual physical    He eats 4 or more servings of fruits and vegetables daily.He consumes 0 sweetened beverage(s) daily.He exercises with enough effort to increase his heart rate 30 to 60 minutes per day.  He exercises with enough effort to increase his heart rate 5 days per week.   He is taking medications regularly.             {Superlists:876548}     {MA/LPN/RN Pre-Provider Visit Orders- hCG/UA/Strep (Optional):959209}  {SUPERLIST (Optional):233316}  {additonal problems for provider to add (Optional):376297}      Constitutional, {Systems choices:647280} systems are negative, except as otherwise noted.       Objective    There were no vitals taken for this visit.  There is no height or weight on file to calculate BMI.  Physical Exam   {Exam List (Optional):078745}          Signed Electronically by: Dariusz Germain MD

## 2025-06-02 ENCOUNTER — OFFICE VISIT (OUTPATIENT)
Dept: INTERNAL MEDICINE | Facility: CLINIC | Age: 70
End: 2025-06-02
Payer: MEDICARE

## 2025-06-02 VITALS
RESPIRATION RATE: 18 BRPM | BODY MASS INDEX: 30.2 KG/M2 | SYSTOLIC BLOOD PRESSURE: 99 MMHG | WEIGHT: 223 LBS | TEMPERATURE: 96.8 F | DIASTOLIC BLOOD PRESSURE: 61 MMHG | OXYGEN SATURATION: 96 % | HEART RATE: 71 BPM | HEIGHT: 72 IN

## 2025-06-02 DIAGNOSIS — I10 HTN, GOAL BELOW 140/90: ICD-10-CM

## 2025-06-02 DIAGNOSIS — E11.9 TYPE 2 DIABETES MELLITUS WITHOUT COMPLICATION, WITHOUT LONG-TERM CURRENT USE OF INSULIN (H): Primary | ICD-10-CM

## 2025-06-02 PROCEDURE — 99207 PR FOOT EXAM NO CHARGE: CPT | Performed by: INTERNAL MEDICINE

## 2025-06-02 PROCEDURE — 99214 OFFICE O/P EST MOD 30 MIN: CPT | Performed by: INTERNAL MEDICINE

## 2025-06-02 PROCEDURE — 3078F DIAST BP <80 MM HG: CPT | Performed by: INTERNAL MEDICINE

## 2025-06-02 PROCEDURE — 3074F SYST BP LT 130 MM HG: CPT | Performed by: INTERNAL MEDICINE

## 2025-06-02 RX ORDER — METFORMIN HYDROCHLORIDE 500 MG/1
TABLET, EXTENDED RELEASE ORAL
Qty: 270 TABLET | Refills: 3 | Status: SHIPPED | OUTPATIENT
Start: 2025-06-02

## 2025-06-02 NOTE — PATIENT INSTRUCTIONS
Patient Education   Preventive Care Advice   This is general advice given by our system to help you stay healthy. However, your care team may have specific advice just for you. Please talk to your care team about your preventive care needs.  Nutrition  Eat 5 or more servings of fruits and vegetables each day.  Try wheat bread, brown rice and whole grain pasta (instead of white bread, rice, and pasta).  Get enough calcium and vitamin D. Check the label on foods and aim for 100% of the RDA (recommended daily allowance).  Lifestyle  Exercise at least 150 minutes each week  (30 minutes a day, 5 days a week).  Do muscle strengthening activities 2 days a week. These help control your weight and prevent disease.  No smoking.  Wear sunscreen to prevent skin cancer.  Have a dental exam and cleaning every 6 months.  Yearly exams  See your health care team every year to talk about:  Any changes in your health.  Any medicines your care team has prescribed.  Preventive care, family planning, and ways to prevent chronic diseases.  Shots (vaccines)   HPV shots (up to age 26), if you've never had them before.  Hepatitis B shots (up to age 59), if you've never had them before.  COVID-19 shot: Get this shot when it's due.  Flu shot: Get a flu shot every year.  Tetanus shot: Get a tetanus shot every 10 years.  Pneumococcal, hepatitis A, and RSV shots: Ask your care team if you need these based on your risk.  Shingles shot (for age 50 and up)  General health tests  Diabetes screening:  Starting at age 35, Get screened for diabetes at least every 3 years.  If you are younger than age 35, ask your care team if you should be screened for diabetes.  Cholesterol test: At age 39, start having a cholesterol test every 5 years, or more often if advised.  Bone density scan (DEXA): At age 50, ask your care team if you should have this scan for osteoporosis (brittle bones).  Hepatitis C: Get tested at least once in your life.  STIs (sexually  transmitted infections)  Before age 24: Ask your care team if you should be screened for STIs.  After age 24: Get screened for STIs if you're at risk. You are at risk for STIs (including HIV) if:  You are sexually active with more than one person.  You don't use condoms every time.  You or a partner was diagnosed with a sexually transmitted infection.  If you are at risk for HIV, ask about PrEP medicine to prevent HIV.  Get tested for HIV at least once in your life, whether you are at risk for HIV or not.  Cancer screening tests  Cervical cancer screening: If you have a cervix, begin getting regular cervical cancer screening tests starting at age 21.  Breast cancer scan (mammogram): If you've ever had breasts, begin having regular mammograms starting at age 40. This is a scan to check for breast cancer.  Colon cancer screening: It is important to start screening for colon cancer at age 45.  Have a colonoscopy test every 10 years (or more often if you're at risk) Or, ask your provider about stool tests like a FIT test every year or Cologuard test every 3 years.  To learn more about your testing options, visit:   .  For help making a decision, visit:   https://bit.ly/sy52608.  Prostate cancer screening test: If you have a prostate, ask your care team if a prostate cancer screening test (PSA) at age 55 is right for you.  Lung cancer screening: If you are a current or former smoker ages 50 to 80, ask your care team if ongoing lung cancer screenings are right for you.  For informational purposes only. Not to replace the advice of your health care provider. Copyright   2023 [employer]. All rights reserved. Clinically reviewed by the [facility] Transitions Program. [employer] 111070 - REV 01/24.

## 2025-06-02 NOTE — PROGRESS NOTES
"  {PROVIDER CHARTING PREFERENCE:380706}    Subjective   Yimi is a 69 year old, presenting for the following health issues:  Follow Up (Kidney lab results, frequent dehydration.)      6/2/2025    11:35 AM   Additional Questions   Roomed by EM         6/2/2025   Declines Weight   Did patient decline having their weight taken? Yes     History of Present Illness       Reason for visit:  Annual physical    He eats 4 or more servings of fruits and vegetables daily.He consumes 0 sweetened beverage(s) daily.He exercises with enough effort to increase his heart rate 30 to 60 minutes per day.  He exercises with enough effort to increase his heart rate 5 days per week.   He is taking medications regularly.        {MA/LPN/RN Pre-Provider Visit Orders- hCG/UA/Strep (Optional):570994}  {SUPERLIST (Optional):931774}  {additonal problems for provider to add (Optional):420889}    {ROS Picklists (Optional):082970}      Objective    BP 99/61 (BP Location: Right arm, Patient Position: Sitting, Cuff Size: Adult Regular)   Pulse 71   Temp 96.8  F (36  C) (Skin)   Resp 18   Ht 1.829 m (6' 0.01\")   Wt 101.2 kg (223 lb)   SpO2 96%   BMI 30.24 kg/m    Body mass index is 30.24 kg/m .  Physical Exam   {Exam List (Optional):495534}    {Diagnostic Test Results (Optional):582111}        Signed Electronically by: Dariusz Germain MD  {Email feedback regarding this note to [email or web address]   :896581}  "

## 2025-06-02 NOTE — PROGRESS NOTES
"  Assessment & Plan     Type 2 diabetes mellitus without complication, without long-term current use of insulin (H)  - Type 2 diabetes mellitus managed with metformin and Ozempic. Patient experiencing weight gain and tingling in toes.  Discussed increasing Ozempic and he is going to think about it.  Does have some constipation side effects  - Diabetes foot exam today with numbness in toes. Monitor for blisters or skin cracking due to numbness in toes. Refill metformin prescription. Schedule lab appointment and Medicare annual wellness exam for September.   - Microalbumin ordered    HTN  - Well controlled on current meds  - BMP ordered and he'd like to wait until next A1C is due in Sept    Would like to discuss audiology referral in the fall    Consent was obtained from the patient to use an AI documentation tool in the creation of this note.        BMI  Estimated body mass index is 30.24 kg/m  as calculated from the following:    Height as of this encounter: 1.829 m (6' 0.01\").    Weight as of this encounter: 101.2 kg (223 lb).   Weight management plan: Pt working on improving diet and exercise        Subjective   Yimi is a 69 year old, presenting for the following health issues:  Follow Up (Kidney lab results, frequent dehydration.)      6/2/2025    11:35 AM   Additional Questions   Roomed by EM         6/2/2025   Declines Weight   Did patient decline having their weight taken? Yes     HPI         History of Present Illness-  Yimi Mcqueen, 69 years    Chronic Kidney Disease  - Diagnosed with chronic kidney disease in the past.  - Reports that kidney function labs have been affected by dehydration due to cycling.  - Often arrives at labs dehydrated because of cycling habits    Weight Gain  - Gained approximately 15 lbs since the election period.  - Attributes weight gain to stress and reduced cycling activity during winter.  - Currently working on losing the gained weight.    Diabetes Management  - Currently " "on Ozempic for diabetes management.  - Reports no magical cure effect from Ozempic, but acknowledges some benefits.  - Previously weighed over 300 lbs about 15 years ago; current weight is much better.    Foot Sensation Issues  - Experiences a little bit of tingling in the feet.  - Noticed a decrease in sensation compared to previous times.    Hearing Concerns  - Considering a hearing test but has concerns about the cost of hearing aids.        Objective    BP 99/61 (BP Location: Right arm, Patient Position: Sitting, Cuff Size: Adult Regular)   Pulse 71   Temp 96.8  F (36  C) (Skin)   Resp 18   Ht 1.829 m (6' 0.01\")   Wt 101.2 kg (223 lb)   SpO2 96%   BMI 30.24 kg/m    Body mass index is 30.24 kg/m .  Physical Exam   GENERAL: alert and no distress  Diabetic foot exam: normal DP and PT pulses, no trophic changes or ulcerative lesions, and reduced monofilament sensation in toes bilaterally            Signed Electronically by: Dariusz Germain MD    "

## 2025-06-26 ENCOUNTER — OFFICE VISIT (OUTPATIENT)
Dept: URGENT CARE | Facility: URGENT CARE | Age: 70
End: 2025-06-26
Payer: MEDICARE

## 2025-06-26 VITALS
OXYGEN SATURATION: 96 % | HEIGHT: 72 IN | HEART RATE: 65 BPM | DIASTOLIC BLOOD PRESSURE: 67 MMHG | TEMPERATURE: 97.8 F | SYSTOLIC BLOOD PRESSURE: 113 MMHG | WEIGHT: 223 LBS | RESPIRATION RATE: 17 BRPM | BODY MASS INDEX: 30.2 KG/M2

## 2025-06-26 DIAGNOSIS — S90.129A CONTUSION OF TOE WITHOUT DAMAGE TO NAIL, UNSPECIFIED LATERALITY, UNSPECIFIED TOE, INITIAL ENCOUNTER: Primary | ICD-10-CM

## 2025-06-26 NOTE — PROGRESS NOTES
Assessment & Plan     Contusion of toe without damage to nail, unspecified laterality, unspecified toe, initial encounter    Patient demonstrates bruising of primarily the primary great toe with mild bruising in the second digit likely from contusion otherwise there is no open wounds or injuries patient has intact sensation.  He is to monitor for any new swelling or pain which may be suggestive infection but this present time there is low suspicion for cellulitis    Patient comfortable with plan to defer xray imaging    Edvin Brasher MD   [facility]    Rodger Geller is a 69 year old male who presents to clinic today for the following health issues:  Chief Complaint   Patient presents with    Urgent Care    Foot Problems     Right foot first, second and third toe bruised      HPI    Patient reports 3 days ago in the garden he lost his step and a lot of the force went to his front right foot he sustained a mild abrasion to the right forearm but otherwise has no limitations in function.  No open wounds or drainage.  No increasing in swelling.  Area appears bruised.      Patient Active Problem List    Diagnosis Date Noted    Stress 11/30/2023     Priority: Medium    Gilbert's syndrome 11/30/2023     Priority: Medium    Microalbuminuria 11/30/2023     Priority: Medium    HSV (herpes simplex virus) infection 05/11/2023     Priority: Medium    Type 2 diabetes mellitus without complication, without long-term current use of insulin (H) 05/10/2023     Priority: Medium    Coronary artery disease involving native coronary artery of native heart without angina pectoris 05/02/2022     Priority: Medium     2 stents      Old myocardial infarction 09/19/2016     Priority: Medium     2016      HTN, goal below 140/90 11/18/2015     Priority: Medium    ED (erectile dysfunction) 09/25/2015     Priority: Medium    Venous (peripheral) insufficiency 09/22/2014     Priority: Medium     Problem list name updated by  automated process. Provider to review      Hyperlipidemia with target LDL less than 100 04/12/2014     Priority: Medium     Diagnosis updated by automated process. Provider to review and confirm.      Numerous moles 04/10/2014     Priority: Medium       Current Outpatient Medications   Medication Sig Dispense Refill    ADACEL 5-2-15.5 LF-MCG/0.5 injection       aspirin 81 MG tablet Take 81 mg by mouth daily      blood glucose (NO BRAND SPECIFIED) test strip 1 strip by In Vitro route 2 times daily      calcium-vitamin D (CALTRATE) 600-400 MG-UNIT per tablet Take 1 tablet by mouth daily       ezetimibe (ZETIA) 10 MG tablet Take 1 tablet by mouth daily at 2 pm      lisinopril (ZESTRIL) 10 MG tablet Take 1 tablet (10 mg) by mouth daily. 90 tablet 3    metFORMIN (GLUCOPHAGE XR) 500 MG 24 hr tablet TAKE 1 TABLET BY MOUTH THREE TIMES A DAY WITH MEALS 270 tablet 3    metoprolol succinate ER (TOPROL XL) 50 MG 24 hr tablet Take 1 tablet (50 mg) by mouth daily. 90 tablet 3    Multiple Vitamin (MULTIVITAMINS PO) Take 1 tablet by mouth daily      nitroGLYcerin (NITROSTAT) 0.4 MG sublingual tablet Place 0.4 mg under the tongue.      rosuvastatin (CRESTOR) 20 MG tablet Take 1 tablet (20 mg) by mouth daily. 90 tablet 3    Semaglutide, 1 MG/DOSE, (OZEMPIC) 4 MG/3ML pen Inject 1 mg subcutaneously every 7 days. 9 mL 3    sildenafil (REVATIO) 20 MG tablet Take 1-2 tablets (20-40 mg) by mouth nightly as needed. 30 tablet 11    Vitamin D3 (CHOLECALCIFEROL) 125 MCG (5000 UT) tablet Take 1 tablet by mouth daily       No current facility-administered medications for this visit.           Objective    /67   Pulse 65   Temp 97.8  F (36.6  C) (Temporal)   Resp 17   Ht 1.829 m (6')   Wt 101.2 kg (223 lb)   SpO2 96%   BMI 30.24 kg/m    Physical Exam   As noted above and including:   Intact movement of all digits.  No warmth or swelling.mild bruising of R great toe mainly dorsal side, toenails intact          No results found for any  visits on 06/26/25.                  The use of Dragon/[employer] services may have been used to construct the content in this note; any grammatical or spelling errors are non-intentional. Please contact the author of this note directly if you are in need of any clarification.

## 2025-06-26 NOTE — PROGRESS NOTES
Urgent Care Clinic Visit    Chief Complaint   Patient presents with    Urgent Care    Foot Problems     Right foot first, second and third toe bruised                 6/26/2025    11:57 AM   Additional Questions   Roomed by polly rob

## 2025-07-05 ENCOUNTER — HEALTH MAINTENANCE LETTER (OUTPATIENT)
Age: 70
End: 2025-07-05

## (undated) RX ORDER — TRIAMCINOLONE ACETONIDE 40 MG/ML
INJECTION, SUSPENSION INTRA-ARTICULAR; INTRAMUSCULAR
Status: DISPENSED
Start: 2024-06-20

## (undated) RX ORDER — LIDOCAINE HYDROCHLORIDE 10 MG/ML
INJECTION, SOLUTION EPIDURAL; INFILTRATION; INTRACAUDAL; PERINEURAL
Status: DISPENSED
Start: 2024-06-20